# Patient Record
Sex: FEMALE | Race: ASIAN | Employment: OTHER | URBAN - METROPOLITAN AREA
[De-identification: names, ages, dates, MRNs, and addresses within clinical notes are randomized per-mention and may not be internally consistent; named-entity substitution may affect disease eponyms.]

---

## 2017-05-23 ENCOUNTER — HOSPITAL ENCOUNTER (OUTPATIENT)
Dept: MAMMOGRAPHY | Age: 72
Discharge: HOME | End: 2017-05-23
Attending: FAMILY MEDICINE
Payer: COMMERCIAL

## 2017-05-23 DIAGNOSIS — Z12.31 ENCOUNTER FOR SCREENING MAMMOGRAM FOR MALIGNANT NEOPLASM OF BREAST: ICD-10-CM

## 2017-05-23 PROCEDURE — 77067 SCR MAMMO BI INCL CAD: CPT | Performed by: FAMILY MEDICINE

## 2017-05-31 ENCOUNTER — HOSPITAL ENCOUNTER (OUTPATIENT)
Dept: MAMMOGRAPHY | Facility: HOSPITAL | Age: 72
Discharge: HOME | End: 2017-05-31
Attending: FAMILY MEDICINE
Payer: COMMERCIAL

## 2017-05-31 DIAGNOSIS — R92.2 INCONCLUSIVE MAMMOGRAM: ICD-10-CM

## 2017-05-31 PROCEDURE — 77065 DX MAMMO INCL CAD UNI: CPT | Performed by: FAMILY MEDICINE

## 2017-05-31 PROCEDURE — 76642 ULTRASOUND BREAST LIMITED: CPT | Performed by: FAMILY MEDICINE

## 2017-05-31 PROCEDURE — 77061 BREAST TOMOSYNTHESIS UNI: CPT | Performed by: FAMILY MEDICINE

## 2018-07-02 ENCOUNTER — HOSPITAL ENCOUNTER (OUTPATIENT)
Dept: MAMMOGRAPHY | Age: 73
Discharge: HOME | End: 2018-07-02
Attending: FAMILY MEDICINE
Payer: COMMERCIAL

## 2018-07-02 DIAGNOSIS — Z12.31 ENCOUNTER FOR SCREENING MAMMOGRAM FOR MALIGNANT NEOPLASM OF BREAST: ICD-10-CM

## 2018-07-02 DIAGNOSIS — R92.1 BREAST CALCIFICATION, LEFT: ICD-10-CM

## 2018-07-02 PROCEDURE — 77067 SCR MAMMO BI INCL CAD: CPT | Performed by: FAMILY MEDICINE

## 2018-07-02 PROCEDURE — 77063 BREAST TOMOSYNTHESIS BI: CPT | Performed by: FAMILY MEDICINE

## 2018-07-05 ENCOUNTER — HOSPITAL ENCOUNTER (OUTPATIENT)
Dept: MAMMOGRAPHY | Age: 73
Discharge: HOME | End: 2018-07-05
Attending: FAMILY MEDICINE
Payer: COMMERCIAL

## 2018-07-05 DIAGNOSIS — R92.1 BREAST CALCIFICATION, LEFT: ICD-10-CM

## 2018-07-05 DIAGNOSIS — Z12.31 ENCOUNTER FOR SCREENING MAMMOGRAM FOR MALIGNANT NEOPLASM OF BREAST: ICD-10-CM

## 2018-07-05 PROCEDURE — 77063 BREAST TOMOSYNTHESIS BI: CPT | Performed by: FAMILY MEDICINE

## 2018-07-05 PROCEDURE — 77067 SCR MAMMO BI INCL CAD: CPT | Performed by: FAMILY MEDICINE

## 2020-11-11 ENCOUNTER — HOSPITAL ENCOUNTER (OUTPATIENT)
Dept: MAMMOGRAPHY | Age: 75
Discharge: HOME | End: 2020-11-11
Attending: FAMILY MEDICINE
Payer: COMMERCIAL

## 2020-11-11 DIAGNOSIS — R92.1 BREAST CALCIFICATIONS ON MAMMOGRAM: ICD-10-CM

## 2020-11-11 DIAGNOSIS — Z12.31 ENCOUNTER FOR SCREENING MAMMOGRAM FOR MALIGNANT NEOPLASM OF BREAST: ICD-10-CM

## 2020-11-11 PROCEDURE — 77063 BREAST TOMOSYNTHESIS BI: CPT | Performed by: FAMILY MEDICINE

## 2020-11-11 PROCEDURE — 77067 SCR MAMMO BI INCL CAD: CPT | Performed by: FAMILY MEDICINE

## 2020-11-16 ENCOUNTER — HOSPITAL ENCOUNTER (OUTPATIENT)
Dept: MAMMOGRAPHY | Facility: HOSPITAL | Age: 75
Discharge: HOME | End: 2020-11-16
Attending: FAMILY MEDICINE
Payer: COMMERCIAL

## 2020-11-16 DIAGNOSIS — R92.2 INCONCLUSIVE MAMMOGRAM: ICD-10-CM

## 2020-11-16 PROCEDURE — 77065 DX MAMMO INCL CAD UNI: CPT | Performed by: FAMILY MEDICINE

## 2021-06-09 ENCOUNTER — HOSPITAL ENCOUNTER (OUTPATIENT)
Dept: MAMMOGRAPHY | Facility: HOSPITAL | Age: 76
Discharge: HOME | End: 2021-06-09
Attending: FAMILY MEDICINE
Payer: COMMERCIAL

## 2021-06-09 DIAGNOSIS — R92.1 BREAST CALCIFICATION SEEN ON MAMMOGRAM: ICD-10-CM

## 2021-06-09 PROCEDURE — 77065 DX MAMMO INCL CAD UNI: CPT | Performed by: FAMILY MEDICINE

## 2021-06-09 PROCEDURE — 77061 BREAST TOMOSYNTHESIS UNI: CPT | Performed by: FAMILY MEDICINE

## 2021-06-09 PROCEDURE — 76642 ULTRASOUND BREAST LIMITED: CPT | Performed by: FAMILY MEDICINE

## 2021-06-09 NOTE — IMAGING NOTE
This Breast Care RN assisted Dr. Dinorah Perez with recommendation for a left breast 1 site stereotactic biopsy for calcifications. Procedure reviewed and all questions answered. Emotional and educational support given.    On the day of the biopsy, pt instructed

## 2021-06-15 ENCOUNTER — HOSPITAL ENCOUNTER (OUTPATIENT)
Dept: MAMMOGRAPHY | Facility: HOSPITAL | Age: 76
Discharge: HOME | End: 2021-06-15
Attending: FAMILY MEDICINE
Payer: COMMERCIAL

## 2021-06-15 DIAGNOSIS — R92.2 INCONCLUSIVE MAMMOGRAM: ICD-10-CM

## 2021-06-15 PROCEDURE — 88305 TISSUE EXAM BY PATHOLOGIST: CPT | Performed by: FAMILY MEDICINE

## 2021-06-15 PROCEDURE — 19081 BX BREAST 1ST LESION STRTCTC: CPT | Performed by: FAMILY MEDICINE

## 2021-06-15 PROCEDURE — 88342 IMHCHEM/IMCYTCHM 1ST ANTB: CPT | Performed by: FAMILY MEDICINE

## 2021-06-17 NOTE — IMAGING NOTE
1514: Spoke with Lashell Merida post stereotactic left breast biopsy. Ms. Yovani Diaz identified with name and date of birth. Introduced myself as breast care coordinator. Reinforced post biopsy care and instruction. Ms. Yovani Diaz.  denies any issues with biopsy

## 2021-07-30 ENCOUNTER — TELEPHONE (OUTPATIENT)
Dept: FAMILY MEDICINE CLINIC | Facility: CLINIC | Age: 76
End: 2021-07-30

## 2021-07-30 NOTE — TELEPHONE ENCOUNTER
Surgery on 08/17/21, RTKA with Dr. Thomas See @ [address] [place] [address]    H&P- complete  Labs- HGB (11.9), MCH (25.4), BUN/Creat ratio (23.0), A/G ratio (0.8), MRSA pending, all other labs WNL  EKG- in Media 09/29/20 WNL    Patient will need Cardiac Clearance and ASA recs  Dr. Tereso Fine

## 2021-08-02 RX ORDER — CHLORAL HYDRATE 500 MG
1000 CAPSULE ORAL NIGHTLY
COMMUNITY

## 2021-08-02 RX ORDER — IBUPROFEN 200 MG
200 TABLET ORAL EVERY 6 HOURS PRN
COMMUNITY

## 2021-08-02 RX ORDER — YEAST,DRIED (S. CEREVISIAE)
POWDER (GRAM) ORAL DAILY
COMMUNITY

## 2021-08-02 RX ORDER — LEVOTHYROXINE SODIUM 0.07 MG/1
75 TABLET ORAL
COMMUNITY

## 2021-08-03 ENCOUNTER — OFFICE VISIT (OUTPATIENT)
Dept: FAMILY MEDICINE CLINIC | Facility: CLINIC | Age: 76
End: 2021-08-03
Payer: COMMERCIAL

## 2021-08-03 ENCOUNTER — LAB ENCOUNTER (OUTPATIENT)
Dept: LAB | Facility: HOSPITAL | Age: 76
End: 2021-08-03
Attending: FAMILY MEDICINE
Payer: COMMERCIAL

## 2021-08-03 VITALS
HEART RATE: 58 BPM | RESPIRATION RATE: 16 BRPM | OXYGEN SATURATION: 98 % | WEIGHT: 170 LBS | HEIGHT: 64.5 IN | SYSTOLIC BLOOD PRESSURE: 102 MMHG | DIASTOLIC BLOOD PRESSURE: 64 MMHG | BODY MASS INDEX: 28.67 KG/M2 | TEMPERATURE: 98 F

## 2021-08-03 DIAGNOSIS — Z01.812 PRE-OPERATIVE LABORATORY EXAMINATION: ICD-10-CM

## 2021-08-03 DIAGNOSIS — M17.12 OSTEOARTHROSIS, LOCALIZED, PRIMARY, KNEE, LEFT: ICD-10-CM

## 2021-08-03 DIAGNOSIS — Z98.61 S/P PTCA (PERCUTANEOUS TRANSLUMINAL CORONARY ANGIOPLASTY): ICD-10-CM

## 2021-08-03 DIAGNOSIS — L81.1 MELASMA: ICD-10-CM

## 2021-08-03 DIAGNOSIS — M54.50 CHRONIC BILATERAL LOW BACK PAIN WITHOUT SCIATICA: ICD-10-CM

## 2021-08-03 DIAGNOSIS — Z01.818 PREOP EXAMINATION: ICD-10-CM

## 2021-08-03 DIAGNOSIS — M85.88 OSTEOPENIA OF LUMBAR SPINE: ICD-10-CM

## 2021-08-03 DIAGNOSIS — I25.10 CORONARY ARTERY DISEASE INVOLVING NATIVE CORONARY ARTERY OF NATIVE HEART WITHOUT ANGINA PECTORIS: ICD-10-CM

## 2021-08-03 DIAGNOSIS — R94.6 BORDERLINE ABNORMAL TFTS: ICD-10-CM

## 2021-08-03 DIAGNOSIS — E55.9 VITAMIN D DEFICIENCY: ICD-10-CM

## 2021-08-03 DIAGNOSIS — G89.29 CHRONIC BILATERAL LOW BACK PAIN WITHOUT SCIATICA: ICD-10-CM

## 2021-08-03 DIAGNOSIS — E78.2 MIXED HYPERLIPIDEMIA: ICD-10-CM

## 2021-08-03 DIAGNOSIS — M17.11 OSTEOARTHROSIS, LOCALIZED, PRIMARY, KNEE, RIGHT: ICD-10-CM

## 2021-08-03 DIAGNOSIS — M17.0 PRIMARY OSTEOARTHRITIS OF BOTH KNEES: Primary | ICD-10-CM

## 2021-08-03 DIAGNOSIS — R73.02 GLUCOSE INTOLERANCE (IMPAIRED GLUCOSE TOLERANCE): ICD-10-CM

## 2021-08-03 DIAGNOSIS — I10 ESSENTIAL HYPERTENSION: ICD-10-CM

## 2021-08-03 LAB
ALBUMIN SERPL-MCNC: 3.4 G/DL (ref 3.4–5)
ALBUMIN/GLOB SERPL: 0.8 {RATIO} (ref 1–2)
ALP LIVER SERPL-CCNC: 89 U/L
ALT SERPL-CCNC: 22 U/L
ANION GAP SERPL CALC-SCNC: 9 MMOL/L (ref 0–18)
ANTIBODY SCREEN: NEGATIVE
AST SERPL-CCNC: 15 U/L (ref 15–37)
BASOPHILS # BLD AUTO: 0.02 X10(3) UL (ref 0–0.2)
BASOPHILS NFR BLD AUTO: 0.3 %
BILIRUB SERPL-MCNC: 0.4 MG/DL (ref 0.1–2)
BUN BLD-MCNC: 14 MG/DL (ref 7–18)
BUN/CREAT SERPL: 23 (ref 10–20)
CALCIUM BLD-MCNC: 10 MG/DL (ref 8.5–10.1)
CHLORIDE SERPL-SCNC: 104 MMOL/L (ref 98–112)
CO2 SERPL-SCNC: 29 MMOL/L (ref 21–32)
CREAT BLD-MCNC: 0.61 MG/DL
DEPRECATED RDW RBC AUTO: 41.9 FL (ref 35.1–46.3)
EOSINOPHIL # BLD AUTO: 0.49 X10(3) UL (ref 0–0.7)
EOSINOPHIL NFR BLD AUTO: 6.8 %
ERYTHROCYTE [DISTWIDTH] IN BLOOD BY AUTOMATED COUNT: 14.2 % (ref 11–15)
GLOBULIN PLAS-MCNC: 4.3 G/DL (ref 2.8–4.4)
GLUCOSE BLD-MCNC: 94 MG/DL (ref 70–99)
HCT VFR BLD AUTO: 38.1 %
HGB BLD-MCNC: 11.9 G/DL
IMM GRANULOCYTES # BLD AUTO: 0.02 X10(3) UL (ref 0–1)
IMM GRANULOCYTES NFR BLD: 0.3 %
LYMPHOCYTES # BLD AUTO: 1.69 X10(3) UL (ref 1–4)
LYMPHOCYTES NFR BLD AUTO: 23.6 %
M PROTEIN MFR SERPL ELPH: 7.7 G/DL (ref 6.4–8.2)
MCH RBC QN AUTO: 25.4 PG (ref 26–34)
MCHC RBC AUTO-ENTMCNC: 31.2 G/DL (ref 31–37)
MCV RBC AUTO: 81.4 FL
MONOCYTES # BLD AUTO: 0.44 X10(3) UL (ref 0.1–1)
MONOCYTES NFR BLD AUTO: 6.1 %
NEUTROPHILS # BLD AUTO: 4.51 X10 (3) UL (ref 1.5–7.7)
NEUTROPHILS # BLD AUTO: 4.51 X10(3) UL (ref 1.5–7.7)
NEUTROPHILS NFR BLD AUTO: 62.9 %
OSMOLALITY SERPL CALC.SUM OF ELEC: 294 MOSM/KG (ref 275–295)
PATIENT FASTING Y/N/NP: YES
PLATELET # BLD AUTO: 243 10(3)UL (ref 150–450)
POTASSIUM SERPL-SCNC: 3.7 MMOL/L (ref 3.5–5.1)
RBC # BLD AUTO: 4.68 X10(6)UL
RH BLOOD TYPE: POSITIVE
SODIUM SERPL-SCNC: 142 MMOL/L (ref 136–145)
WBC # BLD AUTO: 7.2 X10(3) UL (ref 4–11)

## 2021-08-03 PROCEDURE — 3008F BODY MASS INDEX DOCD: CPT | Performed by: FAMILY MEDICINE

## 2021-08-03 PROCEDURE — 99204 OFFICE O/P NEW MOD 45 MIN: CPT | Performed by: FAMILY MEDICINE

## 2021-08-03 PROCEDURE — 3078F DIAST BP <80 MM HG: CPT | Performed by: FAMILY MEDICINE

## 2021-08-03 PROCEDURE — 36415 COLL VENOUS BLD VENIPUNCTURE: CPT

## 2021-08-03 PROCEDURE — 80053 COMPREHEN METABOLIC PANEL: CPT

## 2021-08-03 PROCEDURE — 86850 RBC ANTIBODY SCREEN: CPT

## 2021-08-03 PROCEDURE — 85025 COMPLETE CBC W/AUTO DIFF WBC: CPT

## 2021-08-03 PROCEDURE — 3074F SYST BP LT 130 MM HG: CPT | Performed by: FAMILY MEDICINE

## 2021-08-03 PROCEDURE — 86900 BLOOD TYPING SEROLOGIC ABO: CPT

## 2021-08-03 PROCEDURE — 86901 BLOOD TYPING SEROLOGIC RH(D): CPT

## 2021-08-03 PROCEDURE — 87081 CULTURE SCREEN ONLY: CPT

## 2021-08-03 NOTE — H&P
[address] [facility] PRE-OP CLINIC [place]    PRE-OP NOTE    HPI:   I have been consulted by Dr. Mario Whyte to see Sarahdick Ennisdavid 67year old female for a preoperative evaluation and medical clearance.  She has a long history of worsening severe degenerative arthritis of Oral Cap Take 50,000 Units by mouth once a week.    0   • Multiple Vitamins-Minerals (MULTIVITAMIN WITH MINERALS) Oral Tab Take 1 tablet by mouth daily.        Past Medical History:   Diagnosis Date   • Back pain    • Back problem    • CAD (coronary artery per Week:       Minutes of Exercise per Session:   Stress:       Feeling of Stress :   Social Connections:       Frequency of Communication with Friends and Family:       Frequency of Social Gatherings with Friends and Family:       Attends [employer], no apparent distress. HEENT:  Head:  Normocephalic, atraumatic  Nose: patent, no nasal discharge Throat:  No tonsillar erythema or exudate. Mouth:  No oral lesions or ulcerations, good dentition.   NECK: Supple, no CLAD, no JVD, no carotid brui hyperlipidemia    (R94.6) Borderline abnormal TFTs    (R73.02) Glucose intolerance (impaired glucose tolerance)    (E55.9) Vitamin D deficiency    (M85.88) Osteopenia of lumbar spine    (M54.5,  G89.29) Chronic bilateral low back pain without sciatica    ( supplements with anticoagulation properties as per instruction. Postoperative Recommendations:    Anticoagulation / DVT prophylaxis: SCDs,  mg twice daily with food for four weeks.     GI protection: Protonix  Incentive Spirometry   Telemetry a

## 2021-08-03 NOTE — H&P (VIEW-ONLY)
[address] [facility] PRE-OP CLINIC [place]    PRE-OP NOTE    HPI:   I have been consulted by Dr. Lilibeth Scott to see Torresdamian Devriesluciano 67year old female for a preoperative evaluation and medical clearance.  She has a long history of worsening severe degenerative arthritis of Oral Cap Take 50,000 Units by mouth once a week.    0   • Multiple Vitamins-Minerals (MULTIVITAMIN WITH MINERALS) Oral Tab Take 1 tablet by mouth daily.        Past Medical History:   Diagnosis Date   • Back pain    • Back problem    • CAD (coronary artery per Week:       Minutes of Exercise per Session:   Stress:       Feeling of Stress :   Social Connections:       Frequency of Communication with Friends and Family:       Frequency of Social Gatherings with Friends and Family:       Attends [employer] nour[employer], no apparent distress. HEENT:  Head:  Normocephalic, atraumatic  Nose: patent, no nasal discharge Throat:  No tonsillar erythema or exudate. Mouth:  No oral lesions or ulcerations, good dentition.   NECK: Supple, no CLAD, no JVD, no carotid brui hyperlipidemia    (R94.6) Borderline abnormal TFTs    (R73.02) Glucose intolerance (impaired glucose tolerance)    (E55.9) Vitamin D deficiency    (M85.88) Osteopenia of lumbar spine    (M54.5,  G89.29) Chronic bilateral low back pain without sciatica    ( supplements with anticoagulation properties as per instruction. Postoperative Recommendations:    Anticoagulation / DVT prophylaxis: SCDs,  mg twice daily with food for four weeks.     GI protection: Protonix  Incentive Spirometry   Telemetry a

## 2021-08-04 NOTE — TELEPHONE ENCOUNTER
Surgery on 08/17/21, RTKA with Dr. Jeremiah Trevizo @ [address] [place] [address]    H&P- complete  Labs- HGB (11.9), MCH (25.4), BUN/Creat ratio (23.0), A/G ratio (0.8), all other labs WNL  EKG- in Media 09/29/20 WNL    Patient will need Cardiac Clearance and ASA recs  Dr. Alanna Patel- 6

## 2021-08-06 NOTE — DISCHARGE SUMMARY
Ortho Discharge Summary     Patient ID:  Yoana Gonzalez  [id number]  27 year old  6/20/1945      Admit Date: 8/17/2021 10:17 AM      Discharge Date and Time: 8/18/2021  4:54 PM      Attending Physician: Xi Caballero MD     Reason for admission: right kne

## 2021-08-11 NOTE — TELEPHONE ENCOUNTER
Dr. Franklin Florence, please review:    Labs- HGB (11.9), MCH (25.4), BUN/Creat ratio (23.0), A/G ratio (0.8), all other labs WNL  EKG- in Media 09/29/20 WNL    Cardiac Clearance located in Encounters 08/10/21. Per Dr. Samson Nino:  Last Nuclear Stress 2016 nl perfusion.  CP f

## 2021-08-13 NOTE — TELEPHONE ENCOUNTER
Spoke to patient, let her know per Dr. Gretchen Angel she is clear for surgery and she has stopped her ASA.

## 2021-08-14 ENCOUNTER — LAB ENCOUNTER (OUTPATIENT)
Dept: LAB | Facility: HOSPITAL | Age: 76
End: 2021-08-14
Attending: ORTHOPAEDIC SURGERY
Payer: COMMERCIAL

## 2021-08-14 DIAGNOSIS — Z01.818 PREOP TESTING: ICD-10-CM

## 2021-08-16 PROBLEM — Z96.659 S/P KNEE REPLACEMENT: Status: ACTIVE | Noted: 2021-08-16

## 2021-08-16 LAB — SARS-COV-2 RNA RESP QL NAA+PROBE: NOT DETECTED

## 2021-08-17 ENCOUNTER — ANESTHESIA (OUTPATIENT)
Dept: SURGERY | Facility: HOSPITAL | Age: 76
End: 2021-08-17
Payer: COMMERCIAL

## 2021-08-17 ENCOUNTER — HOSPITAL ENCOUNTER (OUTPATIENT)
Facility: HOSPITAL | Age: 76
Discharge: HOME HEALTH | End: 2021-08-18
Attending: ORTHOPAEDIC SURGERY | Admitting: ORTHOPAEDIC SURGERY
Payer: COMMERCIAL

## 2021-08-17 ENCOUNTER — ANESTHESIA EVENT (OUTPATIENT)
Dept: SURGERY | Facility: HOSPITAL | Age: 76
End: 2021-08-17
Payer: COMMERCIAL

## 2021-08-17 ENCOUNTER — APPOINTMENT (OUTPATIENT)
Dept: GENERAL RADIOLOGY | Facility: HOSPITAL | Age: 76
End: 2021-08-17
Attending: PHYSICIAN ASSISTANT
Payer: COMMERCIAL

## 2021-08-17 DIAGNOSIS — Z01.818 PREOP TESTING: Primary | ICD-10-CM

## 2021-08-17 PROCEDURE — 88305 TISSUE EXAM BY PATHOLOGIST: CPT | Performed by: ORTHOPAEDIC SURGERY

## 2021-08-17 PROCEDURE — 0SRC0J9 REPLACEMENT OF RIGHT KNEE JOINT WITH SYNTHETIC SUBSTITUTE, CEMENTED, OPEN APPROACH: ICD-10-PCS | Performed by: ORTHOPAEDIC SURGERY

## 2021-08-17 PROCEDURE — 88311 DECALCIFY TISSUE: CPT | Performed by: ORTHOPAEDIC SURGERY

## 2021-08-17 PROCEDURE — 73560 X-RAY EXAM OF KNEE 1 OR 2: CPT | Performed by: PHYSICIAN ASSISTANT

## 2021-08-17 DEVICE — INSERT TIB 7 12MM STRL KNEE R: Type: IMPLANTABLE DEVICE | Site: KNEE | Status: FUNCTIONAL

## 2021-08-17 DEVICE — COBALT-G MV BONE CEMENT 40GM
Type: IMPLANTABLE DEVICE | Site: KNEE | Status: FUNCTIONAL
Brand: DJO SURGICAL

## 2021-08-17 DEVICE — COMP FEM 7 STRL KNEE R NPOR: Type: IMPLANTABLE DEVICE | Site: KNEE | Status: FUNCTIONAL

## 2021-08-17 DEVICE — BP TIB 7- STRL KNEE R NPOR DJO: Type: IMPLANTABLE DEVICE | Site: KNEE | Status: FUNCTIONAL

## 2021-08-17 DEVICE — DOMED TRI-PEG PATELLA, 35X9MM, E-PLUS
Type: IMPLANTABLE DEVICE | Site: KNEE | Status: FUNCTIONAL
Brand: DJO SURGICAL

## 2021-08-17 RX ORDER — DIPHENHYDRAMINE HYDROCHLORIDE 50 MG/ML
12.5 INJECTION INTRAMUSCULAR; INTRAVENOUS EVERY 4 HOURS PRN
Status: DISCONTINUED | OUTPATIENT
Start: 2021-08-17 | End: 2021-08-18

## 2021-08-17 RX ORDER — BUPIVACAINE HYDROCHLORIDE 7.5 MG/ML
INJECTION, SOLUTION INTRASPINAL AS NEEDED
Status: DISCONTINUED | OUTPATIENT
Start: 2021-08-17 | End: 2021-08-17 | Stop reason: SURG

## 2021-08-17 RX ORDER — ONDANSETRON 2 MG/ML
4 INJECTION INTRAMUSCULAR; INTRAVENOUS EVERY 4 HOURS PRN
Status: DISCONTINUED | OUTPATIENT
Start: 2021-08-17 | End: 2021-08-18

## 2021-08-17 RX ORDER — BISACODYL 10 MG
10 SUPPOSITORY, RECTAL RECTAL
Status: DISCONTINUED | OUTPATIENT
Start: 2021-08-17 | End: 2021-08-18

## 2021-08-17 RX ORDER — LEVOTHYROXINE SODIUM 0.07 MG/1
75 TABLET ORAL
Status: DISCONTINUED | OUTPATIENT
Start: 2021-08-18 | End: 2021-08-18

## 2021-08-17 RX ORDER — SCOLOPAMINE TRANSDERMAL SYSTEM 1 MG/1
1 PATCH, EXTENDED RELEASE TRANSDERMAL ONCE
Status: DISCONTINUED | OUTPATIENT
Start: 2021-08-17 | End: 2021-08-18

## 2021-08-17 RX ORDER — DIPHENHYDRAMINE HCL 25 MG
25 CAPSULE ORAL EVERY 4 HOURS PRN
Status: DISCONTINUED | OUTPATIENT
Start: 2021-08-17 | End: 2021-08-18

## 2021-08-17 RX ORDER — ONDANSETRON 2 MG/ML
INJECTION INTRAMUSCULAR; INTRAVENOUS AS NEEDED
Status: DISCONTINUED | OUTPATIENT
Start: 2021-08-17 | End: 2021-08-17 | Stop reason: SURG

## 2021-08-17 RX ORDER — ASPIRIN 325 MG
325 TABLET, DELAYED RELEASE (ENTERIC COATED) ORAL 2 TIMES DAILY
Status: DISCONTINUED | OUTPATIENT
Start: 2021-08-17 | End: 2021-08-18

## 2021-08-17 RX ORDER — SODIUM PHOSPHATE, DIBASIC AND SODIUM PHOSPHATE, MONOBASIC 7; 19 G/133ML; G/133ML
1 ENEMA RECTAL ONCE AS NEEDED
Status: DISCONTINUED | OUTPATIENT
Start: 2021-08-17 | End: 2021-08-18

## 2021-08-17 RX ORDER — FAMOTIDINE 10 MG/ML
20 INJECTION, SOLUTION INTRAVENOUS 2 TIMES DAILY
Status: DISCONTINUED | OUTPATIENT
Start: 2021-08-17 | End: 2021-08-18

## 2021-08-17 RX ORDER — HYDROMORPHONE HYDROCHLORIDE 1 MG/ML
0.4 INJECTION, SOLUTION INTRAMUSCULAR; INTRAVENOUS; SUBCUTANEOUS EVERY 5 MIN PRN
Status: DISCONTINUED | OUTPATIENT
Start: 2021-08-17 | End: 2021-08-17 | Stop reason: HOSPADM

## 2021-08-17 RX ORDER — MIDAZOLAM HYDROCHLORIDE 1 MG/ML
INJECTION INTRAMUSCULAR; INTRAVENOUS AS NEEDED
Status: DISCONTINUED | OUTPATIENT
Start: 2021-08-17 | End: 2021-08-17 | Stop reason: SURG

## 2021-08-17 RX ORDER — SODIUM CHLORIDE, SODIUM LACTATE, POTASSIUM CHLORIDE, CALCIUM CHLORIDE 600; 310; 30; 20 MG/100ML; MG/100ML; MG/100ML; MG/100ML
INJECTION, SOLUTION INTRAVENOUS CONTINUOUS
Status: DISCONTINUED | OUTPATIENT
Start: 2021-08-17 | End: 2021-08-17 | Stop reason: HOSPADM

## 2021-08-17 RX ORDER — DEXAMETHASONE SODIUM PHOSPHATE 4 MG/ML
VIAL (ML) INJECTION AS NEEDED
Status: DISCONTINUED | OUTPATIENT
Start: 2021-08-17 | End: 2021-08-17 | Stop reason: SURG

## 2021-08-17 RX ORDER — HYDROCODONE BITARTRATE AND ACETAMINOPHEN 5; 325 MG/1; MG/1
2 TABLET ORAL AS NEEDED
Status: DISCONTINUED | OUTPATIENT
Start: 2021-08-17 | End: 2021-08-17 | Stop reason: HOSPADM

## 2021-08-17 RX ORDER — ONDANSETRON 2 MG/ML
4 INJECTION INTRAMUSCULAR; INTRAVENOUS ONCE AS NEEDED
Status: DISCONTINUED | OUTPATIENT
Start: 2021-08-17 | End: 2021-08-17 | Stop reason: HOSPADM

## 2021-08-17 RX ORDER — METOPROLOL TARTRATE 5 MG/5ML
2.5 INJECTION INTRAVENOUS ONCE
Status: DISCONTINUED | OUTPATIENT
Start: 2021-08-17 | End: 2021-08-17 | Stop reason: HOSPADM

## 2021-08-17 RX ORDER — HYDROCODONE BITARTRATE AND ACETAMINOPHEN 5; 325 MG/1; MG/1
1 TABLET ORAL AS NEEDED
Status: DISCONTINUED | OUTPATIENT
Start: 2021-08-17 | End: 2021-08-17 | Stop reason: HOSPADM

## 2021-08-17 RX ORDER — TRANEXAMIC ACID 10 MG/ML
1000 INJECTION, SOLUTION INTRAVENOUS ONCE
Status: DISCONTINUED | OUTPATIENT
Start: 2021-08-18 | End: 2021-08-17 | Stop reason: HOSPADM

## 2021-08-17 RX ORDER — HYDROMORPHONE HYDROCHLORIDE 1 MG/ML
0.2 INJECTION, SOLUTION INTRAMUSCULAR; INTRAVENOUS; SUBCUTANEOUS EVERY 5 MIN PRN
Status: DISCONTINUED | OUTPATIENT
Start: 2021-08-17 | End: 2021-08-17 | Stop reason: HOSPADM

## 2021-08-17 RX ORDER — MORPHINE SULFATE 10 MG/ML
6 INJECTION, SOLUTION INTRAMUSCULAR; INTRAVENOUS EVERY 10 MIN PRN
Status: DISCONTINUED | OUTPATIENT
Start: 2021-08-17 | End: 2021-08-17 | Stop reason: HOSPADM

## 2021-08-17 RX ORDER — CELECOXIB 200 MG/1
200 CAPSULE ORAL ONCE
Status: COMPLETED | OUTPATIENT
Start: 2021-08-17 | End: 2021-08-17

## 2021-08-17 RX ORDER — LOSARTAN POTASSIUM 25 MG/1
25 TABLET ORAL DAILY
Status: DISCONTINUED | OUTPATIENT
Start: 2021-08-18 | End: 2021-08-18

## 2021-08-17 RX ORDER — ACETAMINOPHEN 325 MG/1
650 TABLET ORAL EVERY 6 HOURS SCHEDULED
Status: DISCONTINUED | OUTPATIENT
Start: 2021-08-17 | End: 2021-08-18

## 2021-08-17 RX ORDER — DIPHENHYDRAMINE HYDROCHLORIDE 50 MG/ML
25 INJECTION INTRAMUSCULAR; INTRAVENOUS ONCE AS NEEDED
Status: ACTIVE | OUTPATIENT
Start: 2021-08-17 | End: 2021-08-17

## 2021-08-17 RX ORDER — MORPHINE SULFATE 4 MG/ML
2 INJECTION, SOLUTION INTRAMUSCULAR; INTRAVENOUS EVERY 10 MIN PRN
Status: DISCONTINUED | OUTPATIENT
Start: 2021-08-17 | End: 2021-08-17 | Stop reason: HOSPADM

## 2021-08-17 RX ORDER — CEFAZOLIN SODIUM/WATER 2 G/20 ML
2 SYRINGE (ML) INTRAVENOUS EVERY 8 HOURS
Status: COMPLETED | OUTPATIENT
Start: 2021-08-17 | End: 2021-08-18

## 2021-08-17 RX ORDER — POLYETHYLENE GLYCOL 3350 17 G/17G
17 POWDER, FOR SOLUTION ORAL DAILY PRN
Status: DISCONTINUED | OUTPATIENT
Start: 2021-08-17 | End: 2021-08-18

## 2021-08-17 RX ORDER — ONDANSETRON 4 MG/1
4 TABLET, FILM COATED ORAL EVERY 8 HOURS PRN
Qty: 30 TABLET | Refills: 0 | Status: SHIPPED | OUTPATIENT
Start: 2021-08-17

## 2021-08-17 RX ORDER — OXYCODONE HYDROCHLORIDE 5 MG/1
TABLET ORAL EVERY 4 HOURS PRN
Qty: 60 TABLET | Refills: 0 | Status: SHIPPED | OUTPATIENT
Start: 2021-08-17

## 2021-08-17 RX ORDER — ASPIRIN 325 MG
325 TABLET, DELAYED RELEASE (ENTERIC COATED) ORAL 2 TIMES DAILY
Qty: 60 TABLET | Refills: 0 | Status: SHIPPED | OUTPATIENT
Start: 2021-08-17 | End: 2021-09-16

## 2021-08-17 RX ORDER — TRANEXAMIC ACID 10 MG/ML
INJECTION, SOLUTION INTRAVENOUS AS NEEDED
Status: DISCONTINUED | OUTPATIENT
Start: 2021-08-17 | End: 2021-08-17 | Stop reason: SURG

## 2021-08-17 RX ORDER — NALOXONE HYDROCHLORIDE 0.4 MG/ML
80 INJECTION, SOLUTION INTRAMUSCULAR; INTRAVENOUS; SUBCUTANEOUS AS NEEDED
Status: DISCONTINUED | OUTPATIENT
Start: 2021-08-17 | End: 2021-08-17 | Stop reason: HOSPADM

## 2021-08-17 RX ORDER — SODIUM CHLORIDE, SODIUM LACTATE, POTASSIUM CHLORIDE, CALCIUM CHLORIDE 600; 310; 30; 20 MG/100ML; MG/100ML; MG/100ML; MG/100ML
INJECTION, SOLUTION INTRAVENOUS CONTINUOUS
Status: DISCONTINUED | OUTPATIENT
Start: 2021-08-17 | End: 2021-08-18

## 2021-08-17 RX ORDER — OXYCODONE HYDROCHLORIDE 5 MG/1
10 TABLET ORAL EVERY 4 HOURS PRN
Status: DISCONTINUED | OUTPATIENT
Start: 2021-08-17 | End: 2021-08-18

## 2021-08-17 RX ORDER — EPHEDRINE SULFATE 50 MG/ML
INJECTION INTRAVENOUS AS NEEDED
Status: DISCONTINUED | OUTPATIENT
Start: 2021-08-17 | End: 2021-08-17 | Stop reason: SURG

## 2021-08-17 RX ORDER — ACETAMINOPHEN, ASPIRIN AND CAFFEINE 250; 250; 65 MG/1; MG/1; MG/1
2 TABLET, FILM COATED ORAL DAILY PRN
Status: DISCONTINUED | OUTPATIENT
Start: 2021-08-17 | End: 2021-08-18

## 2021-08-17 RX ORDER — FAMOTIDINE 20 MG/1
20 TABLET ORAL 2 TIMES DAILY
Status: DISCONTINUED | OUTPATIENT
Start: 2021-08-17 | End: 2021-08-18

## 2021-08-17 RX ORDER — PROCHLORPERAZINE EDISYLATE 5 MG/ML
10 INJECTION INTRAMUSCULAR; INTRAVENOUS EVERY 6 HOURS PRN
Status: DISCONTINUED | OUTPATIENT
Start: 2021-08-17 | End: 2021-08-18

## 2021-08-17 RX ORDER — ATORVASTATIN CALCIUM 20 MG/1
20 TABLET, FILM COATED ORAL NIGHTLY
Status: DISCONTINUED | OUTPATIENT
Start: 2021-08-17 | End: 2021-08-18

## 2021-08-17 RX ORDER — ACETAMINOPHEN 500 MG
1000 TABLET ORAL ONCE
Status: COMPLETED | OUTPATIENT
Start: 2021-08-17 | End: 2021-08-17

## 2021-08-17 RX ORDER — CYCLOBENZAPRINE HCL 5 MG
5 TABLET ORAL EVERY 8 HOURS PRN
Status: DISCONTINUED | OUTPATIENT
Start: 2021-08-17 | End: 2021-08-18

## 2021-08-17 RX ORDER — PROCHLORPERAZINE EDISYLATE 5 MG/ML
5 INJECTION INTRAMUSCULAR; INTRAVENOUS ONCE AS NEEDED
Status: DISCONTINUED | OUTPATIENT
Start: 2021-08-17 | End: 2021-08-17 | Stop reason: HOSPADM

## 2021-08-17 RX ORDER — CEFAZOLIN SODIUM/WATER 2 G/20 ML
2 SYRINGE (ML) INTRAVENOUS ONCE
Status: COMPLETED | OUTPATIENT
Start: 2021-08-17 | End: 2021-08-17

## 2021-08-17 RX ORDER — DOCUSATE SODIUM 100 MG/1
100 CAPSULE, LIQUID FILLED ORAL 2 TIMES DAILY
Status: DISCONTINUED | OUTPATIENT
Start: 2021-08-17 | End: 2021-08-18

## 2021-08-17 RX ORDER — PANTOPRAZOLE SODIUM 40 MG/1
40 TABLET, DELAYED RELEASE ORAL
Qty: 30 TABLET | Refills: 0 | Status: SHIPPED | OUTPATIENT
Start: 2021-08-17

## 2021-08-17 RX ORDER — HYDROMORPHONE HYDROCHLORIDE 1 MG/ML
0.6 INJECTION, SOLUTION INTRAMUSCULAR; INTRAVENOUS; SUBCUTANEOUS EVERY 5 MIN PRN
Status: DISCONTINUED | OUTPATIENT
Start: 2021-08-17 | End: 2021-08-17 | Stop reason: HOSPADM

## 2021-08-17 RX ORDER — LIDOCAINE HYDROCHLORIDE 10 MG/ML
INJECTION, SOLUTION EPIDURAL; INFILTRATION; INTRACAUDAL; PERINEURAL AS NEEDED
Status: DISCONTINUED | OUTPATIENT
Start: 2021-08-17 | End: 2021-08-17 | Stop reason: SURG

## 2021-08-17 RX ORDER — HALOPERIDOL 5 MG/ML
0.25 INJECTION INTRAMUSCULAR ONCE AS NEEDED
Status: DISCONTINUED | OUTPATIENT
Start: 2021-08-17 | End: 2021-08-17 | Stop reason: HOSPADM

## 2021-08-17 RX ORDER — HYDROCHLOROTHIAZIDE 25 MG/1
25 TABLET ORAL DAILY
Status: DISCONTINUED | OUTPATIENT
Start: 2021-08-18 | End: 2021-08-18

## 2021-08-17 RX ORDER — DOCUSATE SODIUM 100 MG/1
100 CAPSULE, LIQUID FILLED ORAL 2 TIMES DAILY
Qty: 60 CAPSULE | Refills: 2 | Status: SHIPPED | OUTPATIENT
Start: 2021-08-17

## 2021-08-17 RX ORDER — OXYCODONE HYDROCHLORIDE 5 MG/1
5 TABLET ORAL EVERY 4 HOURS PRN
Status: DISCONTINUED | OUTPATIENT
Start: 2021-08-17 | End: 2021-08-18

## 2021-08-17 RX ORDER — MORPHINE SULFATE 4 MG/ML
4 INJECTION, SOLUTION INTRAMUSCULAR; INTRAVENOUS EVERY 10 MIN PRN
Status: DISCONTINUED | OUTPATIENT
Start: 2021-08-17 | End: 2021-08-17 | Stop reason: HOSPADM

## 2021-08-17 RX ADMIN — DEXAMETHASONE SODIUM PHOSPHATE 4 MG: 4 MG/ML VIAL (ML) INJECTION at 12:32:00

## 2021-08-17 RX ADMIN — SODIUM CHLORIDE, SODIUM LACTATE, POTASSIUM CHLORIDE, CALCIUM CHLORIDE: 600; 310; 30; 20 INJECTION, SOLUTION INTRAVENOUS at 12:32:00

## 2021-08-17 RX ADMIN — LIDOCAINE HYDROCHLORIDE 5 MG: 10 INJECTION, SOLUTION EPIDURAL; INFILTRATION; INTRACAUDAL; PERINEURAL at 12:19:00

## 2021-08-17 RX ADMIN — BUPIVACAINE HYDROCHLORIDE 1.6 ML: 7.5 INJECTION, SOLUTION INTRASPINAL at 12:22:00

## 2021-08-17 RX ADMIN — TRANEXAMIC ACID 1000 MG: 10 INJECTION, SOLUTION INTRAVENOUS at 12:35:00

## 2021-08-17 RX ADMIN — CEFAZOLIN SODIUM/WATER 2 G: 2 G/20 ML SYRINGE (ML) INTRAVENOUS at 12:32:00

## 2021-08-17 RX ADMIN — ONDANSETRON 4 MG: 2 INJECTION INTRAMUSCULAR; INTRAVENOUS at 12:32:00

## 2021-08-17 RX ADMIN — MIDAZOLAM HYDROCHLORIDE 1 MG: 1 INJECTION INTRAMUSCULAR; INTRAVENOUS at 12:17:00

## 2021-08-17 RX ADMIN — EPHEDRINE SULFATE 10 MG: 50 INJECTION INTRAVENOUS at 13:20:00

## 2021-08-17 RX ADMIN — LIDOCAINE HYDROCHLORIDE 40 MG: 10 INJECTION, SOLUTION EPIDURAL; INFILTRATION; INTRACAUDAL; PERINEURAL at 12:22:00

## 2021-08-17 RX ADMIN — SODIUM CHLORIDE, SODIUM LACTATE, POTASSIUM CHLORIDE, CALCIUM CHLORIDE: 600; 310; 30; 20 INJECTION, SOLUTION INTRAVENOUS at 14:12:00

## 2021-08-17 NOTE — INTERVAL H&P NOTE
Pre-op Diagnosis: right knee degenerative joint disease    The above referenced H&P was reviewed by Patricio Johnson PA-C on 8/17/2021, the patient was examined and no significant changes have occurred in the patient's condition since the H&P was performed.

## 2021-08-17 NOTE — PROGRESS NOTES
[facility]    Ortho Medical Progress Note     Rubia Grief Patient Status:  Outpatient in a Bed    1945 MRN [id number]   Location [address] [facility] Attending Xi Caballero MD   Hosp Day # 0 PCP Jessica Richards hemodynamics, GI status, reviewing labs, and assisting with anticoagulation monitoring as patient appropriate. Lizzie Brown 3, APN  Office [phone]  Cell [phone]  8/17/2021

## 2021-08-17 NOTE — ANESTHESIA PREPROCEDURE EVALUATION
Anesthesia PreOp Note    HPI:     Shandraim Glorious Jacob is a 67year old female who presents for preoperative consultation requested by: Xi Caballero MD    Date of Surgery: 8/17/2021    Procedure(s):  right total knee arthroplasty  Indication: right knee deg • Migraines    • OA (osteoarthritis) 5/7/2013   • Osteoporosis        Past Surgical History:   Procedure Laterality Date   • ANGIOPLASTY (CORONARY)  2014    stent   • HYSTERECTOMY  1990    partial hystero.    • TRINA LOCALIZATION WIRE 1 SITE LEFT (CPT=19281 patch, Transdermal, Once, Kole Krishnan MD  clonidine/epinephrine/ropivacaine/ketorolac (CERTS) pain cocktail, , Intra-articular, Once (Intra-Op), Kole Krishnan MD    No current [facility]-ordered outpatient medications on file.         Fosamax [Alendronat* Ex-Partner:       Emotionally Abused:       Physically Abused:       Sexually Abused:     Available pre-op labs reviewed.   Lab Results   Component Value Date    WBC 7.2 08/03/2021    RBC 4.68 08/03/2021    HGB 11.9 (L) 08/03/2021    HCT 38.1 08/03/2021 plan with:  CRNA      I have informed Shandraim Prabhjot Nails and/or legal guardian or family member of the nature of the anesthetic plan, benefits, risks including possible dental damage if relevant, major complications, and any alternative forms of anesthetic

## 2021-08-17 NOTE — ANESTHESIA PROCEDURE NOTES
Spinal Block  Performed by: Keya Merino CRNA  Authorized by: Vita Stubbs DO       General Information and Staff    Start Time:  8/17/2021 12:20 PM  End Time:  8/17/2021 12:22 PM  Anesthesiologist:  Vita Stubbs DO  CRNA:  Carlita Guzman

## 2021-08-17 NOTE — ANESTHESIA POSTPROCEDURE EVALUATION
Patient: Jt Puri    Procedure Summary     Date: 08/17/21 Room / Location: [address] [place] [address] MAIN OR 04 / [address] [place] [address] OR    Anesthesia Start: 7219 Anesthesia Stop:     Procedure: right total knee arthroplasty (Right Knee) Diagnosis: (right knee degenerative joint

## 2021-08-18 VITALS
OXYGEN SATURATION: 97 % | WEIGHT: 168 LBS | SYSTOLIC BLOOD PRESSURE: 125 MMHG | TEMPERATURE: 98 F | HEIGHT: 64 IN | BODY MASS INDEX: 28.68 KG/M2 | HEART RATE: 58 BPM | RESPIRATION RATE: 16 BRPM | DIASTOLIC BLOOD PRESSURE: 68 MMHG

## 2021-08-18 LAB
ALBUMIN SERPL-MCNC: 2.8 G/DL (ref 3.4–5)
ALBUMIN/GLOB SERPL: 0.8 {RATIO} (ref 1–2)
ALP LIVER SERPL-CCNC: 76 U/L
ALT SERPL-CCNC: 16 U/L
ANION GAP SERPL CALC-SCNC: 3 MMOL/L (ref 0–18)
AST SERPL-CCNC: 17 U/L (ref 15–37)
BILIRUB SERPL-MCNC: 0.4 MG/DL (ref 0.1–2)
BUN BLD-MCNC: 18 MG/DL (ref 7–18)
BUN/CREAT SERPL: 27.3 (ref 10–20)
CALCIUM BLD-MCNC: 8.6 MG/DL (ref 8.5–10.1)
CHLORIDE SERPL-SCNC: 107 MMOL/L (ref 98–112)
CO2 SERPL-SCNC: 30 MMOL/L (ref 21–32)
CREAT BLD-MCNC: 0.66 MG/DL
GLOBULIN PLAS-MCNC: 3.6 G/DL (ref 2.8–4.4)
GLUCOSE BLD-MCNC: 121 MG/DL (ref 70–99)
HCT VFR BLD AUTO: 31.7 %
HGB BLD-MCNC: 10.2 G/DL
M PROTEIN MFR SERPL ELPH: 6.4 G/DL (ref 6.4–8.2)
OSMOLALITY SERPL CALC.SUM OF ELEC: 293 MOSM/KG (ref 275–295)
POTASSIUM SERPL-SCNC: 4 MMOL/L (ref 3.5–5.1)
SODIUM SERPL-SCNC: 140 MMOL/L (ref 136–145)

## 2021-08-18 PROCEDURE — 97530 THERAPEUTIC ACTIVITIES: CPT

## 2021-08-18 PROCEDURE — 97116 GAIT TRAINING THERAPY: CPT

## 2021-08-18 PROCEDURE — 85014 HEMATOCRIT: CPT | Performed by: NURSE PRACTITIONER

## 2021-08-18 PROCEDURE — 85018 HEMOGLOBIN: CPT | Performed by: NURSE PRACTITIONER

## 2021-08-18 PROCEDURE — 97110 THERAPEUTIC EXERCISES: CPT

## 2021-08-18 PROCEDURE — 80053 COMPREHEN METABOLIC PANEL: CPT | Performed by: NURSE PRACTITIONER

## 2021-08-18 PROCEDURE — 97162 PT EVAL MOD COMPLEX 30 MIN: CPT

## 2021-08-18 RX ORDER — LOSARTAN POTASSIUM 25 MG/1
25 TABLET ORAL DAILY
Qty: 30 TABLET | Refills: 11 | Status: SHIPPED | COMMUNITY
Start: 2021-08-18

## 2021-08-18 RX ORDER — HYDROCHLOROTHIAZIDE 25 MG/1
25 TABLET ORAL DAILY
Refills: 0 | Status: SHIPPED | COMMUNITY
Start: 2021-08-18

## 2021-08-18 NOTE — PHYSICAL THERAPY NOTE
PHYSICAL THERAPY KNEE EVALUATION - INPATIENT       Room Number: Room 8/Room 8-A  Evaluation Date: 8/18/2021  Type of Evaluation: Initial  Physician Order: PT Eval and Treat    Presenting Problem: right TKA  Reason for Therapy: Mobility Dysfunction and Disc needs in reach, ice on knee and son in room. All questions answered. RN aware. PM: Patient presents resting in bed, reports taking Tylenol and pain is 3/10 at rest, 8/10 with activity> She needs CGA for bed mobility for right LE only.  CGA for sit to sta (coronary artery disease) 1/10/2014    S/p LHC/ PCI DALY to RCA    • Chest pain 1/10/2014   • Coronary atherosclerosis    • Disorder of thyroid    • High blood pressure    • High cholesterol    • History of blood transfusion 1991    prior to hysterectomy Cognitive Status:  wnl    RANGE OF MOTION AND STRENGTH ASSESSMENT  Upper extremity ROM and strength are within functional limits     Lower extremity ROM is within functional limits right knee 5-80* AM; 5-85* PM    Lower extremity strength is within functio 125/55    Bed Mobility: min A for RLE AM; CGA PM    Transfers: min A AM, CGA PM with cues for hand and RLE placement    Exercise/Education Provided:  Bed mobility  Functional activity tolerated  Gait training  Neuromuscular re-educate  ROM  Strengthening

## 2021-08-18 NOTE — PROGRESS NOTES
[facility]    Progress Note    Christa Reilly Patient Status:  Outpatient in a Bed    1945 MRN [id number]   Location [facility] Attending Dorthula Paget, MD   Hosp Day # 0 PCP Kim Loco MD       Subjective:   Nataly Mccray KESHIA  8/18/2021    Josephine Mederos PA-C  Physician Assistant for Dr. Cesar Salguero: [phone]  F: [phone]  C: [phone]

## 2021-08-18 NOTE — FACE TO FACE NOTE
Face to Face Encounter    I (or a non-physician practitioner working in collaboration with me) had a face to face encounter with this patient during which a medical condition was addressed which is the primary reason for home health care on : 8/18/2021

## 2021-08-18 NOTE — CM/SW NOTE
08/18/21 1300   CM/SW Referral Data   Referral Source Physician   Reason for Referral Discharge planning   Informant Patient; Son   Patient Info   Patient's Current Mental Status at Time of Assessment Alert;Oriented   Patient's [address]   N

## 2021-08-18 NOTE — PROGRESS NOTES
[facility]    Ortho Medical Progress Note     Landon Wilson Patient Status:  Outpatient in a Bed    1945 MRN [id number]   Location [facility] Attending Albert Henry MD   Hosp Day # 0 PCP MD Stacey Carreno ALB 2.8 (L) 08/18/2021    ALKPHO 76 08/18/2021    BILT 0.4 08/18/2021    TP 6.4 08/18/2021    AST 17 08/18/2021    ALT 16 08/18/2021    PTT 27.6 01/11/2014    INR 1.01 01/11/2014    PT 12.9 01/11/2014    T4F 1.12 10/20/2015    TSH 5.200 (H) 10/20/2015

## 2021-08-19 NOTE — OPERATIVE REPORT
[address] [place] [address]  OPERATIVE REPORT  PATIENT NAME: Siddhartha Craft  MR#: [id number]  PMD: Keshia Morgan  DATE OF PROCEDURE: 8/17/2021  PREOPERATIVE DIAGNOSIS: Degenerative Arthritis right knee  POSTOPER day surgery program on Tuesday, August 17, 2021. Following proper identification in the preoperative holding area with confirmation of the above-stated procedure, the patient was brought to the main operating room suite. Procedure was confirmed.  She receiv rotation was based between the medial and middle third of the tibial tubercle. This was provisionally pinned. A size 7 right EMPOWR 3D Non-Porous Femur femoral component was placed along with a 12 mm polyethylene spacer.  With this in place, the knee was ab

## 2021-12-30 ENCOUNTER — HOSPITAL ENCOUNTER (OUTPATIENT)
Dept: MAMMOGRAPHY | Facility: HOSPITAL | Age: 76
Discharge: HOME | End: 2021-12-30
Attending: FAMILY MEDICINE
Payer: COMMERCIAL

## 2021-12-30 DIAGNOSIS — Z98.890 S/P BREAST BIOPSY, LEFT: ICD-10-CM

## 2021-12-30 DIAGNOSIS — R92.1 BREAST CALCIFICATION SEEN ON MAMMOGRAM: ICD-10-CM

## 2021-12-30 DIAGNOSIS — Z12.31 ENCOUNTER FOR SCREENING MAMMOGRAM FOR MALIGNANT NEOPLASM OF BREAST: ICD-10-CM

## 2021-12-30 PROCEDURE — 77062 BREAST TOMOSYNTHESIS BI: CPT | Performed by: FAMILY MEDICINE

## 2021-12-30 PROCEDURE — 77066 DX MAMMO INCL CAD BI: CPT | Performed by: FAMILY MEDICINE

## 2022-05-28 ENCOUNTER — HOSPITAL ENCOUNTER (OUTPATIENT)
Facility: HOSPITAL | Age: 77
Setting detail: OBSERVATION
Discharge: HOME | End: 2022-05-29
Attending: EMERGENCY MEDICINE | Admitting: HOSPITALIST
Payer: COMMERCIAL

## 2022-05-28 DIAGNOSIS — R07.9 ACUTE CHEST PAIN: Primary | ICD-10-CM

## 2022-05-28 LAB
ALBUMIN SERPL-MCNC: 3.4 G/DL (ref 3.4–5)
ALBUMIN/GLOB SERPL: 0.8 {RATIO} (ref 1–2)
ALP LIVER SERPL-CCNC: 101 U/L
ALT SERPL-CCNC: 20 U/L
ANION GAP SERPL CALC-SCNC: 5 MMOL/L (ref 0–18)
AST SERPL-CCNC: 17 U/L (ref 15–37)
BASOPHILS # BLD AUTO: 0.03 X10(3) UL (ref 0–0.2)
BASOPHILS NFR BLD AUTO: 0.3 %
BILIRUB SERPL-MCNC: 0.2 MG/DL (ref 0.1–2)
BILIRUB UR QL STRIP.AUTO: NEGATIVE
BUN BLD-MCNC: 24 MG/DL (ref 7–18)
CALCIUM BLD-MCNC: 9.4 MG/DL (ref 8.5–10.1)
CHLORIDE SERPL-SCNC: 105 MMOL/L (ref 98–112)
CLARITY UR REFRACT.AUTO: CLEAR
CO2 SERPL-SCNC: 29 MMOL/L (ref 21–32)
CREAT BLD-MCNC: 1.05 MG/DL
EOSINOPHIL # BLD AUTO: 0.28 X10(3) UL (ref 0–0.7)
EOSINOPHIL NFR BLD AUTO: 3.1 %
ERYTHROCYTE [DISTWIDTH] IN BLOOD BY AUTOMATED COUNT: 15.1 %
GLOBULIN PLAS-MCNC: 4.2 G/DL (ref 2.8–4.4)
GLUCOSE BLD-MCNC: 133 MG/DL (ref 70–99)
GLUCOSE UR STRIP.AUTO-MCNC: NEGATIVE MG/DL
HCT VFR BLD AUTO: 37.3 %
HGB BLD-MCNC: 11.7 G/DL
IMM GRANULOCYTES # BLD AUTO: 0.04 X10(3) UL (ref 0–1)
IMM GRANULOCYTES NFR BLD: 0.4 %
KETONES UR STRIP.AUTO-MCNC: NEGATIVE MG/DL
LYMPHOCYTES # BLD AUTO: 1.97 X10(3) UL (ref 1–4)
LYMPHOCYTES NFR BLD AUTO: 21.9 %
MCH RBC QN AUTO: 25.5 PG (ref 26–34)
MCHC RBC AUTO-ENTMCNC: 31.4 G/DL (ref 31–37)
MCV RBC AUTO: 81.4 FL
MONOCYTES # BLD AUTO: 0.77 X10(3) UL (ref 0.1–1)
MONOCYTES NFR BLD AUTO: 8.6 %
NEUTROPHILS # BLD AUTO: 5.9 X10 (3) UL (ref 1.5–7.7)
NEUTROPHILS # BLD AUTO: 5.9 X10(3) UL (ref 1.5–7.7)
NEUTROPHILS NFR BLD AUTO: 65.7 %
NITRITE UR QL STRIP.AUTO: NEGATIVE
OSMOLALITY SERPL CALC.SUM OF ELEC: 294 MOSM/KG (ref 275–295)
PH UR STRIP.AUTO: 7 [PH] (ref 5–8)
PLATELET # BLD AUTO: 226 10(3)UL (ref 150–450)
POTASSIUM SERPL-SCNC: 3.3 MMOL/L (ref 3.5–5.1)
PROT SERPL-MCNC: 7.6 G/DL (ref 6.4–8.2)
PROT UR STRIP.AUTO-MCNC: NEGATIVE MG/DL
RBC # BLD AUTO: 4.58 X10(6)UL
RBC UR QL AUTO: NEGATIVE
SODIUM SERPL-SCNC: 139 MMOL/L (ref 136–145)
SP GR UR STRIP.AUTO: 1.01 (ref 1–1.03)
UROBILINOGEN UR STRIP.AUTO-MCNC: <2 MG/DL
WBC # BLD AUTO: 9 X10(3) UL (ref 4–11)

## 2022-05-28 RX ORDER — CARVEDILOL 6.25 MG/1
6.25 TABLET ORAL 2 TIMES DAILY
COMMUNITY
Start: 2022-05-23 | End: 2023-05-23

## 2022-05-29 ENCOUNTER — APPOINTMENT (OUTPATIENT)
Dept: CT IMAGING | Facility: HOSPITAL | Age: 77
End: 2022-05-29
Attending: EMERGENCY MEDICINE
Payer: COMMERCIAL

## 2022-05-29 VITALS
OXYGEN SATURATION: 95 % | TEMPERATURE: 98 F | RESPIRATION RATE: 18 BRPM | SYSTOLIC BLOOD PRESSURE: 148 MMHG | HEART RATE: 69 BPM | WEIGHT: 169.31 LBS | BODY MASS INDEX: 29 KG/M2 | DIASTOLIC BLOOD PRESSURE: 74 MMHG

## 2022-05-29 PROBLEM — R07.9 ACUTE CHEST PAIN: Status: ACTIVE | Noted: 2022-05-29

## 2022-05-29 PROBLEM — E03.9 ACQUIRED HYPOTHYROIDISM: Status: ACTIVE | Noted: 2022-05-29

## 2022-05-29 LAB
ATRIAL RATE: 77 BPM
ATRIAL RATE: 77 BPM
P AXIS: 63 DEGREES
P AXIS: 63 DEGREES
P-R INTERVAL: 154 MS
P-R INTERVAL: 154 MS
POTASSIUM SERPL-SCNC: 3.8 MMOL/L (ref 3.5–5.1)
Q-T INTERVAL: 416 MS
Q-T INTERVAL: 416 MS
QRS DURATION: 74 MS
QRS DURATION: 74 MS
QTC CALCULATION (BEZET): 470 MS
QTC CALCULATION (BEZET): 470 MS
R AXIS: 23 DEGREES
R AXIS: 23 DEGREES
SARS-COV-2 RNA RESP QL NAA+PROBE: NOT DETECTED
T AXIS: 61 DEGREES
T AXIS: 61 DEGREES
TROPONIN I HIGH SENSITIVITY: 12 NG/L
TROPONIN I HIGH SENSITIVITY: 6 NG/L
VENTRICULAR RATE: 77 BPM
VENTRICULAR RATE: 77 BPM

## 2022-05-29 PROCEDURE — 99220 INITIAL OBSERVATION CARE,LEVL III: CPT | Performed by: HOSPITALIST

## 2022-05-29 PROCEDURE — 70450 CT HEAD/BRAIN W/O DYE: CPT | Performed by: EMERGENCY MEDICINE

## 2022-05-29 RX ORDER — LOSARTAN POTASSIUM 25 MG/1
25 TABLET ORAL DAILY
Status: DISCONTINUED | OUTPATIENT
Start: 2022-05-29 | End: 2022-05-29

## 2022-05-29 RX ORDER — LEVOTHYROXINE SODIUM 0.07 MG/1
75 TABLET ORAL
Status: DISCONTINUED | OUTPATIENT
Start: 2022-05-29 | End: 2022-05-29

## 2022-05-29 RX ORDER — CARVEDILOL 6.25 MG/1
6.25 TABLET ORAL 2 TIMES DAILY WITH MEALS
Status: DISCONTINUED | OUTPATIENT
Start: 2022-05-29 | End: 2022-05-29

## 2022-05-29 RX ORDER — ONDANSETRON 2 MG/ML
4 INJECTION INTRAMUSCULAR; INTRAVENOUS EVERY 6 HOURS PRN
Status: DISCONTINUED | OUTPATIENT
Start: 2022-05-29 | End: 2022-05-29

## 2022-05-29 RX ORDER — POLYETHYLENE GLYCOL 3350 17 G/17G
17 POWDER, FOR SOLUTION ORAL DAILY PRN
Status: DISCONTINUED | OUTPATIENT
Start: 2022-05-29 | End: 2022-05-29

## 2022-05-29 RX ORDER — MELATONIN
3 NIGHTLY PRN
Status: DISCONTINUED | OUTPATIENT
Start: 2022-05-29 | End: 2022-05-29

## 2022-05-29 RX ORDER — SODIUM PHOSPHATE, DIBASIC AND SODIUM PHOSPHATE, MONOBASIC 7; 19 G/133ML; G/133ML
1 ENEMA RECTAL ONCE AS NEEDED
Status: DISCONTINUED | OUTPATIENT
Start: 2022-05-29 | End: 2022-05-29

## 2022-05-29 RX ORDER — POTASSIUM CHLORIDE 20 MEQ/1
40 TABLET, EXTENDED RELEASE ORAL ONCE
Status: COMPLETED | OUTPATIENT
Start: 2022-05-29 | End: 2022-05-29

## 2022-05-29 RX ORDER — METOCLOPRAMIDE HYDROCHLORIDE 5 MG/ML
5 INJECTION INTRAMUSCULAR; INTRAVENOUS EVERY 8 HOURS PRN
Status: DISCONTINUED | OUTPATIENT
Start: 2022-05-29 | End: 2022-05-29

## 2022-05-29 RX ORDER — ASPIRIN 81 MG/1
324 TABLET, CHEWABLE ORAL ONCE
Status: COMPLETED | OUTPATIENT
Start: 2022-05-29 | End: 2022-05-29

## 2022-05-29 RX ORDER — HYDROCHLOROTHIAZIDE 25 MG/1
25 TABLET ORAL DAILY
Status: DISCONTINUED | OUTPATIENT
Start: 2022-05-29 | End: 2022-05-29

## 2022-05-29 RX ORDER — POTASSIUM CHLORIDE 20 MEQ/1
40 TABLET, EXTENDED RELEASE ORAL ONCE
Status: CANCELLED | OUTPATIENT
Start: 2022-05-29 | End: 2022-05-29

## 2022-05-29 RX ORDER — ACETAMINOPHEN 325 MG/1
650 TABLET ORAL EVERY 6 HOURS PRN
Status: DISCONTINUED | OUTPATIENT
Start: 2022-05-29 | End: 2022-05-29

## 2022-05-29 RX ORDER — PANTOPRAZOLE SODIUM 40 MG/1
40 TABLET, DELAYED RELEASE ORAL
Status: DISCONTINUED | OUTPATIENT
Start: 2022-05-29 | End: 2022-05-29

## 2022-05-29 RX ORDER — HEPARIN SODIUM 5000 [USP'U]/ML
5000 INJECTION, SOLUTION INTRAVENOUS; SUBCUTANEOUS EVERY 8 HOURS SCHEDULED
Status: DISCONTINUED | OUTPATIENT
Start: 2022-05-29 | End: 2022-05-29

## 2022-05-29 RX ORDER — BISACODYL 10 MG
10 SUPPOSITORY, RECTAL RECTAL
Status: DISCONTINUED | OUTPATIENT
Start: 2022-05-29 | End: 2022-05-29

## 2022-05-29 RX ORDER — SENNOSIDES 8.6 MG
17.2 TABLET ORAL NIGHTLY PRN
Status: DISCONTINUED | OUTPATIENT
Start: 2022-05-29 | End: 2022-05-29

## 2022-05-29 RX ORDER — ATORVASTATIN CALCIUM 20 MG/1
20 TABLET, FILM COATED ORAL NIGHTLY
Status: DISCONTINUED | OUTPATIENT
Start: 2022-05-29 | End: 2022-05-29

## 2022-05-29 NOTE — PLAN OF CARE
Assumed care of patient @ 0730. No acute distress noted. Denies chest pain or shortness of breath. No c/o dizziness. A&Ox4. VSS on RA. NSR on tele. VTE prophylaxis. Cardiac electrolyte replacement- replaced potassium per protocol. Up at breann. All needs met. Poc: cards consult. Problem: Patient/Family Goals  Goal: Patient/Family Long Term Goal  Description: Patient's Long Term Goal: go home  Interventions:  - per hospitalist/cards  - monitor cardiac status  - See additional Care Plan goals for specific interventions  Outcome: Progressing  Goal: Patient/Family Short Term Goal  Description: Patient's Short Term Goal: no pain   Interventions:   - pain management  - See additional Care Plan goals for specific interventions  Outcome: Progressing  Problem: CARDIOVASCULAR - ADULT  Goal: Maintains optimal cardiac output and hemodynamic stability  Description: INTERVENTIONS:  - Monitor vital signs, rhythm, and trends  - Monitor for bleeding, hypotension and signs of decreased cardiac output  - Evaluate effectiveness of vasoactive medications to optimize hemodynamic stability  - Monitor arterial and/or venous puncture sites for bleeding and/or hematoma  - Assess quality of pulses, skin color and temperature  - Assess for signs of decreased coronary artery perfusion - ex.  Angina  - Evaluate fluid balance, assess for edema, trend weights  Outcome: Progressing  Goal: Absence of cardiac arrhythmias or at baseline  Description: INTERVENTIONS:  - Continuous cardiac monitoring, monitor vital signs, obtain 12 lead EKG if indicated  - Evaluate effectiveness of antiarrhythmic and heart rate control medications as ordered  - Initiate emergency measures for life threatening arrhythmias  - Monitor electrolytes and administer replacement therapy as ordered  Outcome: Progressing

## 2022-05-29 NOTE — PROGRESS NOTES
NURSING ADMISSION NOTE      Patient admitted via Cart  Oriented to room. Safety precautions initiated. Bed in low position. Call light in reach. Patient admitted in stable condition. Admission navigator completed by this RN. Pt A&O4. Room air. NSR on tele. Cardiac diet. Continent of bowel and bladder. Denies pain and nausea. Tele placed. IV flushed and capped. Safety precautions in place. All needs met at this time.

## 2022-05-29 NOTE — PROGRESS NOTES
NURSING DISCHARGE NOTE    Discharged Home via Ambulatory. Accompanied by Spouse  Belongings Taken by patient/family. Pt discharged home. Repeat trop normal. No c/o chest pain or sob. IV and tele removed. Discharge paperwork reviewed with patient. All needs met.

## 2022-05-29 NOTE — ED QUICK NOTES
Orders for admission, patient is aware of plan and ready to go upstairs. Any questions, please call ED RN  Kathi Qureshi at extension [phone]. Vaccinated?  Yes  Type of COVID test sent: Rapid PCR-Negative  COVID Suspicion level: Low      Titratable drug(s) infusing:n/a  Rate:    LOC at time of transport:AX[facility]    Other pertinent information:    CIWA score=N/A  NIH score=N/A

## 2022-05-29 NOTE — ED INITIAL ASSESSMENT (HPI)
76YF c/c of headache,  HTN Pt state that she felt near syncopal eariler today with headache Pt state that the syncopal has past but remains having a headache

## 2022-12-28 ENCOUNTER — HOSPITAL ENCOUNTER (OUTPATIENT)
Dept: MAMMOGRAPHY | Age: 77
Discharge: HOME | End: 2022-12-28
Attending: FAMILY MEDICINE
Payer: COMMERCIAL

## 2022-12-28 DIAGNOSIS — R92.1 BREAST CALCIFICATION SEEN ON MAMMOGRAM: ICD-10-CM

## 2022-12-28 DIAGNOSIS — Z98.890 H/O LEFT BREAST BIOPSY: ICD-10-CM

## 2022-12-28 DIAGNOSIS — Z12.31 ENCOUNTER FOR SCREENING MAMMOGRAM FOR MALIGNANT NEOPLASM OF BREAST: ICD-10-CM

## 2022-12-28 PROCEDURE — 77067 SCR MAMMO BI INCL CAD: CPT | Performed by: FAMILY MEDICINE

## 2022-12-28 PROCEDURE — 77063 BREAST TOMOSYNTHESIS BI: CPT | Performed by: FAMILY MEDICINE

## 2023-01-12 ENCOUNTER — OFFICE VISIT (OUTPATIENT)
Facility: LOCATION | Age: 78
End: 2023-01-12
Payer: COMMERCIAL

## 2023-01-12 DIAGNOSIS — H93.293 ABNORMAL AUDITORY PERCEPTION OF BOTH EARS: Primary | ICD-10-CM

## 2023-01-12 DIAGNOSIS — H91.93 AUDITORY IMPAIRMENT, BILATERAL: ICD-10-CM

## 2023-01-12 DIAGNOSIS — H61.22 CERUMINOSIS, LEFT: Primary | ICD-10-CM

## 2023-01-12 PROCEDURE — 99204 OFFICE O/P NEW MOD 45 MIN: CPT | Performed by: OTOLARYNGOLOGY

## 2023-01-12 PROCEDURE — 92567 TYMPANOMETRY: CPT | Performed by: AUDIOLOGIST

## 2023-01-12 PROCEDURE — 92557 COMPREHENSIVE HEARING TEST: CPT | Performed by: AUDIOLOGIST

## 2023-01-12 NOTE — PROGRESS NOTES
Kelsi Jay was seen for an audiometric evaluation and tympanogram today. Referred back to physician.     Marino Haq MS, CCC-A

## 2023-02-15 ENCOUNTER — HOSPITAL ENCOUNTER (OUTPATIENT)
Dept: MAMMOGRAPHY | Facility: HOSPITAL | Age: 78
Discharge: HOME | End: 2023-02-15
Attending: FAMILY MEDICINE
Payer: COMMERCIAL

## 2023-02-15 DIAGNOSIS — R92.2 INCONCLUSIVE MAMMOGRAM: ICD-10-CM

## 2023-02-15 PROCEDURE — 76642 ULTRASOUND BREAST LIMITED: CPT | Performed by: FAMILY MEDICINE

## 2023-02-15 NOTE — IMAGING NOTE
This Breast Care RN assisted Dr. Bonnie Devine with recommendation for a left 1 site ultrasound guided biopsy for mass. Procedure reviewed and all questions answered. Emotional and educational support given. On the day of the biopsy, pt instructed to take Tylenol 1000mg PO, eat a light meal & bring or wear a sports bra. Post biopsy care also reviewed with pt to include NO lifting more than 5lbs, no exercising or housework (limit upper body movement) for 24-48 hrs post biopsy. Pt denies liver disease, bleeding disorders, blood thinners, and chemo. Pt verbalized understanding. Our breast center schedulers will be calling to schedule an appt that is convenient for pt.

## 2023-02-21 ENCOUNTER — HOSPITAL ENCOUNTER (OUTPATIENT)
Dept: MAMMOGRAPHY | Facility: HOSPITAL | Age: 78
Discharge: HOME | End: 2023-02-21
Attending: FAMILY MEDICINE
Payer: COMMERCIAL

## 2023-02-21 DIAGNOSIS — R92.8 OTHER ABNORMAL AND INCONCLUSIVE FINDINGS ON DIAGNOSTIC IMAGING OF BREAST: ICD-10-CM

## 2023-02-21 DIAGNOSIS — N63.0 BREAST MASS: ICD-10-CM

## 2023-02-21 PROCEDURE — 88360 TUMOR IMMUNOHISTOCHEM/MANUAL: CPT | Performed by: FAMILY MEDICINE

## 2023-02-21 PROCEDURE — 77065 DX MAMMO INCL CAD UNI: CPT | Performed by: FAMILY MEDICINE

## 2023-02-21 PROCEDURE — 88305 TISSUE EXAM BY PATHOLOGIST: CPT | Performed by: FAMILY MEDICINE

## 2023-02-21 PROCEDURE — 88344 IMHCHEM/IMCYTCHM EA MLT ANTB: CPT | Performed by: FAMILY MEDICINE

## 2023-02-21 PROCEDURE — 88342 IMHCHEM/IMCYTCHM 1ST ANTB: CPT | Performed by: FAMILY MEDICINE

## 2023-02-21 PROCEDURE — 19083 BX BREAST 1ST LESION US IMAG: CPT | Performed by: FAMILY MEDICINE

## 2023-02-23 NOTE — IMAGING NOTE
1524: Spoke with Umu Mariee and her . Ms. Nichelle Ruelas is  post ultrasound guided left breast biopsy. Introduced myself and informed Ms. Nichelle Ruelas and her  of the purpose of my call. Name and date of birth verified by patient. Reinforced post biopsy care and instruction. Ms. Nichelle Ruelas denies any issues with biopsy site- bleeding, drainage, redness, tenderness. Pathology results and recommendations discussed as follows:   Final Diagnosis:   Left breast mass  ultrasound-guided core biopsies:  -Invasive ductal carcinoma. Dr. Letty Mcgrath referring to Dr. Curt Burch. Ms. Nichelle Ruelas instructed to make an appointment with Dr. Jerald Stein. Dr. Madera Stake office phone number provided. Encouraged Ms. Sena to follow-up with Dr. Amelia Felix if she has questions/concerns. Kelsi Sena verbalized understanding and agreement to the above.

## 2023-02-23 NOTE — IMAGING NOTE
1425: Spoke with ALISHA Henry in Dr. Diana Peralta office. Reported pathology results for Ashwini Sena's left breast biopsy performed Tuesday, February 21 as follows:    Final Diagnosis:   Left breast mass  ultrasound-guided core biopsies:  -Invasive ductal carcinoma. Per EMR biopsy order dated 2-15-23 - Dr. Letty Mcgrath referring to Dr. Lula Smith. ALISHA Henry requested pathology report to be faxed to Dr. Evangelina Sanchez office- fax # [phone]:  [facility]- to fax pathology final report

## 2023-02-24 ENCOUNTER — NURSE NAVIGATOR ENCOUNTER (OUTPATIENT)
Dept: HEMATOLOGY/ONCOLOGY | Facility: HOSPITAL | Age: 78
End: 2023-02-24

## 2023-02-24 NOTE — PROGRESS NOTES
Phoned patient and we discussed newly diagnosed breast cancer. Introduced myself as one of the breast nurse navigators and explained the role of the breast nurse navigator. Explained the role of the physicians on her breast cancer care team. Reviewed over pathology report and discussed the type of breast cancer, grade, and ER/NM and Her 2. Briefly discussed breast cancer treatments including surgery, radiation, hormone therapy, and chemotherapy. Explained the breast cancer multidisciplinary meeting and that her case will be discussed. Patient given my contact information to call with any further questions or concerns.

## 2023-02-28 ENCOUNTER — OFFICE VISIT (OUTPATIENT)
Dept: HEMATOLOGY/ONCOLOGY | Facility: HOSPITAL | Age: 78
End: 2023-02-28
Attending: INTERNAL MEDICINE
Payer: COMMERCIAL

## 2023-02-28 ENCOUNTER — OFFICE VISIT (OUTPATIENT)
Dept: HEMATOLOGY/ONCOLOGY | Facility: HOSPITAL | Age: 78
End: 2023-02-28
Attending: SURGERY
Payer: COMMERCIAL

## 2023-02-28 ENCOUNTER — NURSE NAVIGATOR ENCOUNTER (OUTPATIENT)
Dept: HEMATOLOGY/ONCOLOGY | Facility: HOSPITAL | Age: 78
End: 2023-02-28

## 2023-02-28 ENCOUNTER — OFFICE VISIT (OUTPATIENT)
Facility: LOCATION | Age: 78
End: 2023-02-28
Payer: COMMERCIAL

## 2023-02-28 VITALS
HEART RATE: 67 BPM | TEMPERATURE: 98 F | DIASTOLIC BLOOD PRESSURE: 83 MMHG | OXYGEN SATURATION: 99 % | SYSTOLIC BLOOD PRESSURE: 141 MMHG | WEIGHT: 175.5 LBS | RESPIRATION RATE: 18 BRPM | BODY MASS INDEX: 30 KG/M2

## 2023-02-28 DIAGNOSIS — C50.412 MALIGNANT NEOPLASM OF UPPER-OUTER QUADRANT OF LEFT BREAST IN FEMALE, ESTROGEN RECEPTOR NEGATIVE (HCC): Primary | ICD-10-CM

## 2023-02-28 DIAGNOSIS — C50.812 MALIGNANT NEOPLASM OF OVERLAPPING SITES OF LEFT BREAST IN FEMALE, ESTROGEN RECEPTOR NEGATIVE (HCC): Primary | ICD-10-CM

## 2023-02-28 DIAGNOSIS — Z17.1 MALIGNANT NEOPLASM OF UPPER-OUTER QUADRANT OF LEFT BREAST IN FEMALE, ESTROGEN RECEPTOR NEGATIVE (HCC): Primary | ICD-10-CM

## 2023-02-28 DIAGNOSIS — Z17.1 MALIGNANT NEOPLASM OF OVERLAPPING SITES OF LEFT BREAST IN FEMALE, ESTROGEN RECEPTOR NEGATIVE (HCC): Primary | ICD-10-CM

## 2023-02-28 PROCEDURE — 3077F SYST BP >= 140 MM HG: CPT | Performed by: INTERNAL MEDICINE

## 2023-02-28 PROCEDURE — 3079F DIAST BP 80-89 MM HG: CPT | Performed by: INTERNAL MEDICINE

## 2023-02-28 PROCEDURE — 99244 OFF/OP CNSLTJ NEW/EST MOD 40: CPT | Performed by: SURGERY

## 2023-02-28 PROCEDURE — 99211 OFF/OP EST MAY X REQ PHY/QHP: CPT

## 2023-02-28 PROCEDURE — 99205 OFFICE O/P NEW HI 60 MIN: CPT | Performed by: INTERNAL MEDICINE

## 2023-02-28 NOTE — PROGRESS NOTES
Asked for an open MRI, called [facility] that does open, however they do not offer open MRI for breast.  Informed patient of this. Scheduled echo for 3/7 and breast MRI for 3/13. Patient looking for earlier appointment. Plans to call around to have a sooner date. Asked to let me know by 3/1 if she is going to an outside facility for the images; then will cancel them at [facility]. Will need to get CDs with images if done somewhere else.

## 2023-02-28 NOTE — PROGRESS NOTES
Dx: Malignant neoplasm of overlapping sites of left breast in female, estrogen receptor negative. Patient presents to clinic for breast consultation. Ultrasound - guided 12 gauge - needle core biopsies (LFT Breast)  performed on 2/21/23. Invasive ductal carcinoma. Patient is here to discuss pathology results and surgical options. Appointment with oncologist Dr. Angel Marshall was earlier today. Patient reports biopsy site healing well some intermittent itching and some redness. Steri-strips still intact. Denies any bleeding, discharge, fever, or chills. Patient has some LFT axilla discomfort. Medication and allergies reviewed and updated.

## 2023-02-28 NOTE — PROGRESS NOTES
Met with patient,  and daughter in clinic. Introduced myself as one the breast nurse navigators and explained the role of the breast nurse navigator and coordination of care. Explained the role of all of the physicians involved in her care including the surgeon, medical oncologist, radiation oncologist, and possibly plastic surgeon. Reviewed over the patients pathology report and discussed receptors including ER/[place]/ and Her 2. Patient was given the breast cancer treatment handbook and reviewed over how to use this resource. Discussed the breast multidisciplinary conference and that her case will be discussed. Patient was given the contact information for the social workers at the [facility] and resources for support as requested. Breast cancer journey map provided to patient. Next step in care will be to schedule echo, patient will call tomorrow with plans for when starting chemo; will schedule port once chemo start date is decided. Provided information on support group at THE [facility]. Discuss wig options and how to obtain a free wig and also through insurance. Pt was provided with breast nurse navigator contact information and was encouraged to phone with any other questions or concerns.

## 2023-02-28 NOTE — PROGRESS NOTES
Education Record    Learner:  Patient/ spouse/ daughter    Disease / [place] breast cancer       Barriers / Limitations:  None   Comments:    Method:  Discussion   Comments:    General Topics:  Plan of care reviewed   Comments:    Outcome:  Shows understanding   Comments:  Feeling ok from the biopsy, steri strips in place, some itching. Pt is active.

## 2023-03-02 RX ORDER — DIAZEPAM 10 MG/1
10 TABLET ORAL AS NEEDED
Qty: 1 TABLET | Refills: 0 | Status: SHIPPED | OUTPATIENT
Start: 2023-03-02

## 2023-03-06 ENCOUNTER — NURSE NAVIGATOR ENCOUNTER (OUTPATIENT)
Dept: HEMATOLOGY/ONCOLOGY | Facility: HOSPITAL | Age: 78
End: 2023-03-06

## 2023-03-06 NOTE — PROGRESS NOTES
Patient's spouse called stating that he spoke to his [employer] agent and that the BRCA 1 BRCA 2 is not covered with his insurance for the genetics appointment and would like his wife's appointment with Umer johns for tomorrow. He stated once I get his wife's appointment for the BRCA 1 and BRCA 2 tests pre-approved, to call him. I informed him that, unfortunately, I do not do the pre-approvals, but I will contact Joanna Herzog and find out who we can contact for the pre-approvals. He thanked me for the assistance. Pt was provided with the breast nurse navigators contact information and was encouraged to phone with any other questions or concerns.

## 2023-03-06 NOTE — PROGRESS NOTES
Spoke to patient's  and patient multiple times today in regards to genetics appointment and canceled appointment for tomorrow, Tuesday March 7, 2023. He stated that he called their [employer] PPO and stated that they needed a \"pre-approval or pre-authorization for BRCA 1 and BRCA 2\" from me. I stated that I didn't do pre-approvals and I stated that I would contact Joanna Herzog in regards to their request and I received a message back from [facility] and she stated that she would need to have her consultation with the patient to make the determination for either BRCA testing or for the panel to be sent for testing and that she did not do pre-approvals for patients. He stated that he did not want to pay for the consultation and he was stuck in a \"Catch 22\" and I offered the help of the [employer] for financial assistance for genetics and he accepted. He inquired about his wife's appointment with chemotherapy education if it was 1:1 and I confirmed it was. He thanked me for the phone calls back and assistance. Pt was provided with the breast nurse navigators contact information and was encouraged to phone with any other questions or concerns.

## 2023-03-07 ENCOUNTER — APPOINTMENT (OUTPATIENT)
Dept: HEMATOLOGY/ONCOLOGY | Facility: HOSPITAL | Age: 78
End: 2023-03-07
Attending: SURGERY
Payer: COMMERCIAL

## 2023-03-07 ENCOUNTER — LAB ENCOUNTER (OUTPATIENT)
Dept: LAB | Facility: HOSPITAL | Age: 78
End: 2023-03-07
Attending: INTERNAL MEDICINE
Payer: COMMERCIAL

## 2023-03-07 ENCOUNTER — APPOINTMENT (OUTPATIENT)
Dept: GENETICS | Facility: HOSPITAL | Age: 78
End: 2023-03-07
Attending: SURGERY
Payer: COMMERCIAL

## 2023-03-07 DIAGNOSIS — C50.412 MALIGNANT NEOPLASM OF UPPER-OUTER QUADRANT OF LEFT BREAST IN FEMALE, ESTROGEN RECEPTOR NEGATIVE (HCC): ICD-10-CM

## 2023-03-07 DIAGNOSIS — Z17.1 MALIGNANT NEOPLASM OF UPPER-OUTER QUADRANT OF LEFT BREAST IN FEMALE, ESTROGEN RECEPTOR NEGATIVE (HCC): ICD-10-CM

## 2023-03-07 LAB
BASOPHILS # BLD AUTO: 0.03 X10(3) UL (ref 0–0.2)
BASOPHILS NFR BLD AUTO: 0.5 %
EOSINOPHIL # BLD AUTO: 0.2 X10(3) UL (ref 0–0.7)
EOSINOPHIL NFR BLD AUTO: 3.1 %
ERYTHROCYTE [DISTWIDTH] IN BLOOD BY AUTOMATED COUNT: 14.6 %
HCT VFR BLD AUTO: 36.8 %
HGB BLD-MCNC: 11.7 G/DL
IMM GRANULOCYTES # BLD AUTO: 0.03 X10(3) UL (ref 0–1)
IMM GRANULOCYTES NFR BLD: 0.5 %
LYMPHOCYTES # BLD AUTO: 1.46 X10(3) UL (ref 1–4)
LYMPHOCYTES NFR BLD AUTO: 22.8 %
MCH RBC QN AUTO: 26.1 PG (ref 26–34)
MCHC RBC AUTO-ENTMCNC: 31.8 G/DL (ref 31–37)
MCV RBC AUTO: 82 FL
MONOCYTES # BLD AUTO: 0.42 X10(3) UL (ref 0.1–1)
MONOCYTES NFR BLD AUTO: 6.6 %
NEUTROPHILS # BLD AUTO: 4.25 X10 (3) UL (ref 1.5–7.7)
NEUTROPHILS # BLD AUTO: 4.25 X10(3) UL (ref 1.5–7.7)
NEUTROPHILS NFR BLD AUTO: 66.5 %
PLATELET # BLD AUTO: 236 10(3)UL (ref 150–450)
RBC # BLD AUTO: 4.49 X10(6)UL
WBC # BLD AUTO: 6.4 X10(3) UL (ref 4–11)

## 2023-03-07 PROCEDURE — 36415 COLL VENOUS BLD VENIPUNCTURE: CPT

## 2023-03-07 PROCEDURE — 85025 COMPLETE CBC W/AUTO DIFF WBC: CPT

## 2023-03-09 ENCOUNTER — HOSPITAL ENCOUNTER (OUTPATIENT)
Dept: INTERVENTIONAL RADIOLOGY/VASCULAR | Facility: HOSPITAL | Age: 78
Discharge: HOME | End: 2023-03-09
Attending: INTERNAL MEDICINE | Admitting: INTERNAL MEDICINE
Payer: COMMERCIAL

## 2023-03-09 VITALS
RESPIRATION RATE: 17 BRPM | HEART RATE: 63 BPM | DIASTOLIC BLOOD PRESSURE: 56 MMHG | OXYGEN SATURATION: 98 % | SYSTOLIC BLOOD PRESSURE: 111 MMHG | TEMPERATURE: 98 F

## 2023-03-09 DIAGNOSIS — C50.412 MALIGNANT NEOPLASM OF UPPER-OUTER QUADRANT OF LEFT BREAST IN FEMALE, ESTROGEN RECEPTOR NEGATIVE (HCC): Primary | ICD-10-CM

## 2023-03-09 DIAGNOSIS — Z17.1 MALIGNANT NEOPLASM OF OVERLAPPING SITES OF LEFT BREAST IN FEMALE, ESTROGEN RECEPTOR NEGATIVE (HCC): ICD-10-CM

## 2023-03-09 DIAGNOSIS — C50.812 MALIGNANT NEOPLASM OF OVERLAPPING SITES OF LEFT BREAST IN FEMALE, ESTROGEN RECEPTOR NEGATIVE (HCC): ICD-10-CM

## 2023-03-09 DIAGNOSIS — Z17.1 MALIGNANT NEOPLASM OF UPPER-OUTER QUADRANT OF LEFT BREAST IN FEMALE, ESTROGEN RECEPTOR NEGATIVE (HCC): Primary | ICD-10-CM

## 2023-03-09 LAB — INR: 0.9 (ref 0.8–1.3)

## 2023-03-09 PROCEDURE — 36561 INSERT TUNNELED CV CATH: CPT | Performed by: RADIOLOGY

## 2023-03-09 PROCEDURE — 85610 PROTHROMBIN TIME: CPT | Performed by: RADIOLOGY

## 2023-03-09 PROCEDURE — B518ZZA FLUOROSCOPY OF SUPERIOR VENA CAVA, GUIDANCE: ICD-10-PCS | Performed by: RADIOLOGY

## 2023-03-09 PROCEDURE — 76937 US GUIDE VASCULAR ACCESS: CPT | Performed by: RADIOLOGY

## 2023-03-09 PROCEDURE — B543ZZA ULTRASONOGRAPHY OF RIGHT JUGULAR VEINS, GUIDANCE: ICD-10-PCS | Performed by: RADIOLOGY

## 2023-03-09 PROCEDURE — 77001 FLUOROGUIDE FOR VEIN DEVICE: CPT | Performed by: RADIOLOGY

## 2023-03-09 PROCEDURE — 02HV33Z INSERTION OF INFUSION DEVICE INTO SUPERIOR VENA CAVA, PERCUTANEOUS APPROACH: ICD-10-PCS | Performed by: RADIOLOGY

## 2023-03-09 PROCEDURE — 0JH60WZ INSERTION OF TOTALLY IMPLANTABLE VASCULAR ACCESS DEVICE INTO CHEST SUBCUTANEOUS TISSUE AND FASCIA, OPEN APPROACH: ICD-10-PCS | Performed by: RADIOLOGY

## 2023-03-09 PROCEDURE — 99152 MOD SED SAME PHYS/QHP 5/>YRS: CPT | Performed by: RADIOLOGY

## 2023-03-09 RX ORDER — MIDAZOLAM HYDROCHLORIDE 1 MG/ML
INJECTION INTRAMUSCULAR; INTRAVENOUS
Status: COMPLETED
Start: 2023-03-09 | End: 2023-03-09

## 2023-03-09 RX ORDER — LIDOCAINE HYDROCHLORIDE 10 MG/ML
INJECTION, SOLUTION INFILTRATION; PERINEURAL
Status: COMPLETED
Start: 2023-03-09 | End: 2023-03-09

## 2023-03-09 RX ORDER — LIDOCAINE HYDROCHLORIDE AND EPINEPHRINE BITARTRATE 20; .01 MG/ML; MG/ML
INJECTION, SOLUTION SUBCUTANEOUS
Status: COMPLETED
Start: 2023-03-09 | End: 2023-03-09

## 2023-03-09 RX ORDER — SODIUM CHLORIDE 9 MG/ML
INJECTION, SOLUTION INTRAVENOUS CONTINUOUS
Status: DISCONTINUED | OUTPATIENT
Start: 2023-03-09 | End: 2023-03-09

## 2023-03-09 RX ORDER — HEPARIN SODIUM 5000 [USP'U]/ML
INJECTION, SOLUTION INTRAVENOUS; SUBCUTANEOUS
Status: COMPLETED
Start: 2023-03-09 | End: 2023-03-09

## 2023-03-09 RX ORDER — CEFAZOLIN SODIUM 1 G/3ML
INJECTION, POWDER, FOR SOLUTION INTRAMUSCULAR; INTRAVENOUS
Status: COMPLETED
Start: 2023-03-09 | End: 2023-03-09

## 2023-03-09 RX ADMIN — SODIUM CHLORIDE: 9 INJECTION, SOLUTION INTRAVENOUS at 09:15:00

## 2023-03-09 NOTE — PROGRESS NOTES
Pt s/p PAC placement. Dressings to R upper chest remained CDI throughout recovery period. VSS. Pt denied pain. Tolerated PO intake and ambulation in room. IV d/c'd. Discharge instructions given-including port card-pt verbalized understanding. Pt to lobby via [facility] and  to drive home.

## 2023-03-09 NOTE — DISCHARGE INSTRUCTIONS
Do not drive for 24 hours. Do not drink alcohol for the next 24 hours. Keep the \"pink\" dressing clean and dry for 5 days. Do not shower until this \"pink\" dressing is removed. The chemo nurse will remove this dressing if you have chemo before 5 days. If you have not had a chemo session in 5 days, remove the \"pink\" dressing. After the \"pink\" dressing is removed you may shower. Do not submerge the port site until it is completely healed. The small white dressing on your neck can be removed tomorrow. The small white strips (steri strips) under the \"pink\" dressing are to remain in place. DO NOT remove these strips. They will fall off on their own in a week or two. If you have a fever >100.4 degrees, chills, signs of infection including redness, swelling, thick yellow drainage and/or a foul smell coming from the port site call your oncologist right away.

## 2023-03-09 NOTE — PROCEDURES
[facility]  Procedure Note    Perfecto Kunz Patient Status:  Outpatient    1945 MRN [id number]   Location [address] [facility] Attending Jacqueline Islas MD   Hosp Day # 0 PCP Brook Hernandez MD     Procedure: [facility] placement    Pre-Procedure Diagnosis:  Left breast cancer    Post-Procedure Diagnosis: Same    Anesthesia:  Sedation    Findings:  Patent right internal jugular vein    Specimens: None    Blood Loss:  < 5 cc    Tourniquet Time: None  Complications:  None  Drains:  None    Secondary Diagnosis:  n/A    Juice Garcia MD  3/9/2023

## 2023-03-10 ENCOUNTER — OFFICE VISIT (OUTPATIENT)
Dept: HEMATOLOGY/ONCOLOGY | Facility: HOSPITAL | Age: 78
End: 2023-03-10
Attending: SURGERY
Payer: COMMERCIAL

## 2023-03-10 DIAGNOSIS — Z17.1 MALIGNANT NEOPLASM OF OVERLAPPING SITES OF LEFT BREAST IN FEMALE, ESTROGEN RECEPTOR NEGATIVE (HCC): Primary | ICD-10-CM

## 2023-03-10 DIAGNOSIS — C50.812 MALIGNANT NEOPLASM OF OVERLAPPING SITES OF LEFT BREAST IN FEMALE, ESTROGEN RECEPTOR NEGATIVE (HCC): Primary | ICD-10-CM

## 2023-03-10 DIAGNOSIS — Z71.9 ENCOUNTER FOR HEALTH EDUCATION: ICD-10-CM

## 2023-03-10 DIAGNOSIS — C50.412 MALIGNANT NEOPLASM OF UPPER-OUTER QUADRANT OF LEFT BREAST IN FEMALE, ESTROGEN RECEPTOR NEGATIVE (HCC): ICD-10-CM

## 2023-03-10 DIAGNOSIS — Z17.1 MALIGNANT NEOPLASM OF UPPER-OUTER QUADRANT OF LEFT BREAST IN FEMALE, ESTROGEN RECEPTOR NEGATIVE (HCC): ICD-10-CM

## 2023-03-10 RX ORDER — PROCHLORPERAZINE MALEATE 10 MG
10 TABLET ORAL EVERY 6 HOURS PRN
Qty: 30 TABLET | Refills: 3 | Status: SHIPPED | OUTPATIENT
Start: 2023-03-10

## 2023-03-10 RX ORDER — ONDANSETRON HYDROCHLORIDE 8 MG/1
8 TABLET, FILM COATED ORAL EVERY 8 HOURS PRN
Qty: 30 TABLET | Refills: 3 | Status: SHIPPED | OUTPATIENT
Start: 2023-03-10

## 2023-03-13 ENCOUNTER — HOSPITAL ENCOUNTER (OUTPATIENT)
Dept: MRI IMAGING | Facility: HOSPITAL | Age: 78
Discharge: HOME | End: 2023-03-13
Attending: SURGERY
Payer: COMMERCIAL

## 2023-03-13 DIAGNOSIS — C50.412 MALIGNANT NEOPLASM OF UPPER-OUTER QUADRANT OF LEFT BREAST IN FEMALE, ESTROGEN RECEPTOR NEGATIVE (HCC): ICD-10-CM

## 2023-03-13 DIAGNOSIS — Z17.1 MALIGNANT NEOPLASM OF UPPER-OUTER QUADRANT OF LEFT BREAST IN FEMALE, ESTROGEN RECEPTOR NEGATIVE (HCC): ICD-10-CM

## 2023-03-13 PROCEDURE — A9575 INJ GADOTERATE MEGLUMI 0.1ML: HCPCS | Performed by: SURGERY

## 2023-03-13 PROCEDURE — 77049 MRI BREAST C-+ W/CAD BI: CPT | Performed by: SURGERY

## 2023-03-13 RX ORDER — GADOTERATE MEGLUMINE 376.9 MG/ML
20 INJECTION INTRAVENOUS
Status: COMPLETED | OUTPATIENT
Start: 2023-03-13 | End: 2023-03-13

## 2023-03-13 RX ADMIN — GADOTERATE MEGLUMINE 17 ML: 376.9 INJECTION INTRAVENOUS at 12:18:00

## 2023-03-14 ENCOUNTER — OFFICE VISIT (OUTPATIENT)
Dept: HEMATOLOGY/ONCOLOGY | Facility: HOSPITAL | Age: 78
End: 2023-03-14
Attending: SURGERY
Payer: COMMERCIAL

## 2023-03-14 ENCOUNTER — TELEPHONE (OUTPATIENT)
Dept: MAMMOGRAPHY | Facility: HOSPITAL | Age: 78
End: 2023-03-14

## 2023-03-14 DIAGNOSIS — Z17.1 MALIGNANT NEOPLASM OF UPPER-OUTER QUADRANT OF LEFT BREAST IN FEMALE, ESTROGEN RECEPTOR NEGATIVE (HCC): Primary | ICD-10-CM

## 2023-03-14 DIAGNOSIS — C50.412 MALIGNANT NEOPLASM OF UPPER-OUTER QUADRANT OF LEFT BREAST IN FEMALE, ESTROGEN RECEPTOR NEGATIVE (HCC): Primary | ICD-10-CM

## 2023-03-14 NOTE — TELEPHONE ENCOUNTER
This Breast Care RN phoned pt re left breast 1 site stereotactic biopsy recommendation by Dr. Sujatha Ramires for calcifications (see breast MRI report)   Procedure reviewed and all questions answered. Emotional support given. Reviewed educational handouts with patient and her . On the day of the biopsy, pt instructed to take Tylenol 1000mg PO, eat a light meal & bring or wear a sports bra. Post biopsy care also reviewed with pt to include NO lifting more than 5lbs, no exercising or housework (limit upper body movement) for 24-48 hrs post biopsy. Patient denies blood thinners, bleeding disorders, liver disease, and chemo. Pt verbalized understanding. Our breast center schedulers will be calling to schedule an appt that is convenient for pt.

## 2023-03-15 ENCOUNTER — HOSPITAL ENCOUNTER (OUTPATIENT)
Dept: MAMMOGRAPHY | Facility: HOSPITAL | Age: 78
Discharge: HOME | End: 2023-03-15
Attending: INTERNAL MEDICINE
Payer: COMMERCIAL

## 2023-03-15 DIAGNOSIS — Z17.1 MALIGNANT NEOPLASM OF UPPER-OUTER QUADRANT OF LEFT BREAST IN FEMALE, ESTROGEN RECEPTOR NEGATIVE (HCC): ICD-10-CM

## 2023-03-15 DIAGNOSIS — C50.412 MALIGNANT NEOPLASM OF UPPER-OUTER QUADRANT OF LEFT BREAST IN FEMALE, ESTROGEN RECEPTOR NEGATIVE (HCC): ICD-10-CM

## 2023-03-15 PROCEDURE — 19081 BX BREAST 1ST LESION STRTCTC: CPT | Performed by: INTERNAL MEDICINE

## 2023-03-15 PROCEDURE — 88305 TISSUE EXAM BY PATHOLOGIST: CPT | Performed by: INTERNAL MEDICINE

## 2023-03-15 PROCEDURE — 88360 TUMOR IMMUNOHISTOCHEM/MANUAL: CPT | Performed by: INTERNAL MEDICINE

## 2023-03-16 ENCOUNTER — OFFICE VISIT (OUTPATIENT)
Dept: HEMATOLOGY/ONCOLOGY | Facility: HOSPITAL | Age: 78
End: 2023-03-16
Attending: SURGERY
Payer: COMMERCIAL

## 2023-03-16 ENCOUNTER — SOCIAL WORK SERVICES (OUTPATIENT)
Dept: HEMATOLOGY/ONCOLOGY | Facility: HOSPITAL | Age: 78
End: 2023-03-16

## 2023-03-16 VITALS
DIASTOLIC BLOOD PRESSURE: 71 MMHG | WEIGHT: 174.38 LBS | RESPIRATION RATE: 18 BRPM | HEIGHT: 63.19 IN | BODY MASS INDEX: 30.52 KG/M2 | HEART RATE: 73 BPM | TEMPERATURE: 98 F | OXYGEN SATURATION: 98 % | SYSTOLIC BLOOD PRESSURE: 175 MMHG

## 2023-03-16 DIAGNOSIS — Z17.1 MALIGNANT NEOPLASM OF UPPER-OUTER QUADRANT OF LEFT BREAST IN FEMALE, ESTROGEN RECEPTOR NEGATIVE (HCC): Primary | ICD-10-CM

## 2023-03-16 DIAGNOSIS — C50.412 MALIGNANT NEOPLASM OF UPPER-OUTER QUADRANT OF LEFT BREAST IN FEMALE, ESTROGEN RECEPTOR NEGATIVE (HCC): Primary | ICD-10-CM

## 2023-03-16 LAB
ALBUMIN SERPL-MCNC: 3.2 G/DL (ref 3.4–5)
ALBUMIN/GLOB SERPL: 0.8 {RATIO} (ref 1–2)
ALP LIVER SERPL-CCNC: 92 U/L
ALT SERPL-CCNC: 25 U/L
ANION GAP SERPL CALC-SCNC: 4 MMOL/L (ref 0–18)
AST SERPL-CCNC: 22 U/L (ref 15–37)
BASOPHILS # BLD AUTO: 0.02 X10(3) UL (ref 0–0.2)
BASOPHILS NFR BLD AUTO: 0.3 %
BILIRUB SERPL-MCNC: 0.4 MG/DL (ref 0.1–2)
BUN BLD-MCNC: 20 MG/DL (ref 7–18)
CALCIUM BLD-MCNC: 9.3 MG/DL (ref 8.5–10.1)
CHLORIDE SERPL-SCNC: 104 MMOL/L (ref 98–112)
CO2 SERPL-SCNC: 30 MMOL/L (ref 21–32)
CREAT BLD-MCNC: 0.72 MG/DL
EOSINOPHIL # BLD AUTO: 0.23 X10(3) UL (ref 0–0.7)
EOSINOPHIL NFR BLD AUTO: 3.2 %
ERYTHROCYTE [DISTWIDTH] IN BLOOD BY AUTOMATED COUNT: 14.6 %
GFR SERPLBLD BASED ON 1.73 SQ M-ARVRAT: 86 ML/MIN/1.73M2 (ref 60–?)
GLOBULIN PLAS-MCNC: 4 G/DL (ref 2.8–4.4)
GLUCOSE BLD-MCNC: 106 MG/DL (ref 70–99)
HCT VFR BLD AUTO: 36.1 %
HGB BLD-MCNC: 11.5 G/DL
IMM GRANULOCYTES # BLD AUTO: 0.03 X10(3) UL (ref 0–1)
IMM GRANULOCYTES NFR BLD: 0.4 %
LYMPHOCYTES # BLD AUTO: 1.32 X10(3) UL (ref 1–4)
LYMPHOCYTES NFR BLD AUTO: 18.4 %
MCH RBC QN AUTO: 26 PG (ref 26–34)
MCHC RBC AUTO-ENTMCNC: 31.9 G/DL (ref 31–37)
MCV RBC AUTO: 81.5 FL
MONOCYTES # BLD AUTO: 0.53 X10(3) UL (ref 0.1–1)
MONOCYTES NFR BLD AUTO: 7.4 %
NEUTROPHILS # BLD AUTO: 5.03 X10 (3) UL (ref 1.5–7.7)
NEUTROPHILS # BLD AUTO: 5.03 X10(3) UL (ref 1.5–7.7)
NEUTROPHILS NFR BLD AUTO: 70.3 %
OSMOLALITY SERPL CALC.SUM OF ELEC: 289 MOSM/KG (ref 275–295)
PLATELET # BLD AUTO: 235 10(3)UL (ref 150–450)
POTASSIUM SERPL-SCNC: 3.7 MMOL/L (ref 3.5–5.1)
PROT SERPL-MCNC: 7.2 G/DL (ref 6.4–8.2)
RBC # BLD AUTO: 4.43 X10(6)UL
SODIUM SERPL-SCNC: 138 MMOL/L (ref 136–145)
WBC # BLD AUTO: 7.2 X10(3) UL (ref 4–11)

## 2023-03-16 PROCEDURE — 96417 CHEMO IV INFUS EACH ADDL SEQ: CPT

## 2023-03-16 PROCEDURE — 85025 COMPLETE CBC W/AUTO DIFF WBC: CPT

## 2023-03-16 PROCEDURE — S0028 INJECTION, FAMOTIDINE, 20 MG: HCPCS | Performed by: INTERNAL MEDICINE

## 2023-03-16 PROCEDURE — 99215 OFFICE O/P EST HI 40 MIN: CPT | Performed by: INTERNAL MEDICINE

## 2023-03-16 PROCEDURE — 96413 CHEMO IV INFUSION 1 HR: CPT

## 2023-03-16 PROCEDURE — 96375 TX/PRO/DX INJ NEW DRUG ADDON: CPT

## 2023-03-16 PROCEDURE — 80053 COMPREHEN METABOLIC PANEL: CPT

## 2023-03-16 RX ORDER — ACETAMINOPHEN 325 MG/1
650 TABLET ORAL ONCE
Status: CANCELLED | OUTPATIENT
Start: 2023-03-16

## 2023-03-16 RX ORDER — ACETAMINOPHEN 325 MG/1
650 TABLET ORAL ONCE
Status: COMPLETED | OUTPATIENT
Start: 2023-03-16 | End: 2023-03-16

## 2023-03-16 RX ORDER — FAMOTIDINE 10 MG/ML
20 INJECTION, SOLUTION INTRAVENOUS ONCE
Status: CANCELLED | OUTPATIENT
Start: 2023-03-16

## 2023-03-16 RX ORDER — FAMOTIDINE 10 MG/ML
20 INJECTION, SOLUTION INTRAVENOUS ONCE
Status: COMPLETED | OUTPATIENT
Start: 2023-03-16 | End: 2023-03-16

## 2023-03-16 RX ORDER — ATORVASTATIN CALCIUM 20 MG/1
20 TABLET, FILM COATED ORAL NIGHTLY
COMMUNITY

## 2023-03-16 RX ORDER — DIPHENHYDRAMINE HYDROCHLORIDE 50 MG/ML
25 INJECTION INTRAMUSCULAR; INTRAVENOUS ONCE
Status: COMPLETED | OUTPATIENT
Start: 2023-03-16 | End: 2023-03-16

## 2023-03-16 RX ORDER — DIPHENHYDRAMINE HYDROCHLORIDE 50 MG/ML
25 INJECTION INTRAMUSCULAR; INTRAVENOUS ONCE
Status: CANCELLED | OUTPATIENT
Start: 2023-03-16

## 2023-03-16 RX ORDER — LORAZEPAM 2 MG/ML
0.5 INJECTION INTRAMUSCULAR AS NEEDED
Status: CANCELLED
Start: 2023-03-16

## 2023-03-16 RX ADMIN — DIPHENHYDRAMINE HYDROCHLORIDE 25 MG: 50 INJECTION INTRAMUSCULAR; INTRAVENOUS at 10:23:00

## 2023-03-16 RX ADMIN — FAMOTIDINE 20 MG: 10 INJECTION, SOLUTION INTRAVENOUS at 10:23:00

## 2023-03-16 RX ADMIN — ACETAMINOPHEN 650 MG: 325 TABLET ORAL at 10:24:00

## 2023-03-16 NOTE — PROGRESS NOTES
Pt here for C1D1 Taxol/Trazimera. Arrives Ambulating independently, accompanied by Self and Spouse           Pregnancy screening: Denies possibility of pregnancy    Modifications in dose or schedule: No    Drugs/infusions dual verified for appearance and physical integrity. IV pump settings were dual verified: yes     Frequency of blood return and site check throughout administration: Prior to administration   Discharged to Home, Ambulating independently, accompanied by:Self and Spouse    Outpatient Oncology Care Plan  Problem list:  No complaints  Problems related to: No complaints  Interventions:  promoted rest  Expected outcomes:  understands plan of care  Progress towards outcome:  making progress    Education Record    Learner:  Patient  Barriers / Limitations:  None  Method:  Discussion  Outcome:  Shows understanding  Comments: Patient tolerated infusion without incident. Labs drawn from new port without incident, and it is working appropriately. Patient was nervous for infusion today, PRN ativan was ordered but not given today. She was nervous about how to take her nausea regimen at home, advised patient on schedule and gave a printout of times. Appt requested for 6 days from now, as MD wants to eventually get her onto a Tuesday regimen schedule. Patient left in stable condition, verbalized understanding of post infusion needs.

## 2023-03-16 NOTE — PATIENT INSTRUCTIONS
Anti-Nausea Medication:    Ondansetron (Zofran) -- can take every 8 hours as needed for nausea. Prochlorperazine (Compazine) -- can take every 6-8 hours as needed for nausea. To Alternate: Can take Compazine 4 hours after IV Zofran today. Then 4 hours later can take oral Zofran again, 4 hours later compazine, etc.     IV Zofran today @___10:25am________    Oral Compazine @___2:25pm_______    Oral Zofran @___6:25pm_____    You do not need to wake yourself up to take anything. Start up again in the morning. Alternate every 4 hours like this for next 2-3 days.

## 2023-03-16 NOTE — PROGRESS NOTES
met with patient and  Jonelle Zafar in treatment room for introduction and role explanation. Patient states that she is feeling a little anxious for treatment, which she knows is normal. She reports that she is most concerned on how treatment will make her feel, along with the possibility of reaction.  provided support and comfort, assuring patient that she is in good hands with very responsive RNs. Patient and  have 4 Sons, Gabino and Angélica Venegas, who are both  and live in [facility]. They have 5 grandchildren total. Patient states that they are happy their children live nearby, and they are calling and coming to the house regularly, so she has a lot of support. Patient states that both sons are working, but are higher up in the [employer] in Management; educated on FMLA if needed.  assisted in completing Power of  for Healthcare where patient named  Jonelle Zafar as Primary Agent; copies made and handed to family and given to Maxx Barrera to upload into their chart. Educated on available resources, providing Social Work Support Packet including [address] [facility], [place], Transportation, and [address] contacts. Educated on BHI if desired. Contact provided and family is aware to reach out with any needs. Bringing Coty Bag given, which patient was grateful for.

## 2023-03-17 ENCOUNTER — TELEPHONE (OUTPATIENT)
Dept: HEMATOLOGY/ONCOLOGY | Facility: HOSPITAL | Age: 78
End: 2023-03-17

## 2023-03-17 NOTE — TELEPHONE ENCOUNTER
Toxicities: C1 D1 Paclitaxel/Trastuzumab-qyyp on 3/16/2023    David reports Shamim is feeling \"good. \" She was a little tired when they got home yesterday. She slept well and has good energy today. No side effects at this time. I encouraged him to please call the office if Shamim is not feeling well or they have any questions or concerns. He agreed and thanked me for checking on her.

## 2023-03-22 ENCOUNTER — OFFICE VISIT (OUTPATIENT)
Dept: HEMATOLOGY/ONCOLOGY | Facility: HOSPITAL | Age: 78
End: 2023-03-22
Attending: SURGERY
Payer: COMMERCIAL

## 2023-03-22 VITALS
SYSTOLIC BLOOD PRESSURE: 157 MMHG | WEIGHT: 175.63 LBS | RESPIRATION RATE: 18 BRPM | HEART RATE: 77 BPM | OXYGEN SATURATION: 98 % | TEMPERATURE: 98 F | BODY MASS INDEX: 30.73 KG/M2 | DIASTOLIC BLOOD PRESSURE: 85 MMHG | HEIGHT: 63.19 IN

## 2023-03-22 DIAGNOSIS — Z17.1 MALIGNANT NEOPLASM OF UPPER-OUTER QUADRANT OF LEFT BREAST IN FEMALE, ESTROGEN RECEPTOR NEGATIVE (HCC): Primary | ICD-10-CM

## 2023-03-22 DIAGNOSIS — C50.412 MALIGNANT NEOPLASM OF UPPER-OUTER QUADRANT OF LEFT BREAST IN FEMALE, ESTROGEN RECEPTOR NEGATIVE (HCC): Primary | ICD-10-CM

## 2023-03-22 DIAGNOSIS — T45.1X5A ADVERSE EFFECT OF CHEMOTHERAPY, INITIAL ENCOUNTER: ICD-10-CM

## 2023-03-22 LAB
ALBUMIN SERPL-MCNC: 3.2 G/DL (ref 3.4–5)
ALBUMIN/GLOB SERPL: 0.8 {RATIO} (ref 1–2)
ALP LIVER SERPL-CCNC: 86 U/L
ALT SERPL-CCNC: 61 U/L
ANION GAP SERPL CALC-SCNC: 5 MMOL/L (ref 0–18)
AST SERPL-CCNC: 32 U/L (ref 15–37)
BASOPHILS # BLD AUTO: 0.02 X10(3) UL (ref 0–0.2)
BASOPHILS NFR BLD AUTO: 0.3 %
BILIRUB SERPL-MCNC: 0.5 MG/DL (ref 0.1–2)
BUN BLD-MCNC: 20 MG/DL (ref 7–18)
CALCIUM BLD-MCNC: 9.5 MG/DL (ref 8.5–10.1)
CHLORIDE SERPL-SCNC: 104 MMOL/L (ref 98–112)
CO2 SERPL-SCNC: 29 MMOL/L (ref 21–32)
CREAT BLD-MCNC: 0.83 MG/DL
EOSINOPHIL # BLD AUTO: 0.18 X10(3) UL (ref 0–0.7)
EOSINOPHIL NFR BLD AUTO: 3.1 %
ERYTHROCYTE [DISTWIDTH] IN BLOOD BY AUTOMATED COUNT: 14.6 %
GFR SERPLBLD BASED ON 1.73 SQ M-ARVRAT: 72 ML/MIN/1.73M2 (ref 60–?)
GLOBULIN PLAS-MCNC: 3.8 G/DL (ref 2.8–4.4)
GLUCOSE BLD-MCNC: 106 MG/DL (ref 70–99)
HCT VFR BLD AUTO: 35.3 %
HGB BLD-MCNC: 11.5 G/DL
IMM GRANULOCYTES # BLD AUTO: 0.04 X10(3) UL (ref 0–1)
IMM GRANULOCYTES NFR BLD: 0.7 %
LYMPHOCYTES # BLD AUTO: 1.13 X10(3) UL (ref 1–4)
LYMPHOCYTES NFR BLD AUTO: 19.8 %
MCH RBC QN AUTO: 26.7 PG (ref 26–34)
MCHC RBC AUTO-ENTMCNC: 32.6 G/DL (ref 31–37)
MCV RBC AUTO: 81.9 FL
MONOCYTES # BLD AUTO: 0.19 X10(3) UL (ref 0.1–1)
MONOCYTES NFR BLD AUTO: 3.3 %
NEUTROPHILS # BLD AUTO: 4.16 X10 (3) UL (ref 1.5–7.7)
NEUTROPHILS # BLD AUTO: 4.16 X10(3) UL (ref 1.5–7.7)
NEUTROPHILS NFR BLD AUTO: 72.8 %
OSMOLALITY SERPL CALC.SUM OF ELEC: 289 MOSM/KG (ref 275–295)
PLATELET # BLD AUTO: 218 10(3)UL (ref 150–450)
POTASSIUM SERPL-SCNC: 3.7 MMOL/L (ref 3.5–5.1)
PROT SERPL-MCNC: 7 G/DL (ref 6.4–8.2)
RBC # BLD AUTO: 4.31 X10(6)UL
SODIUM SERPL-SCNC: 138 MMOL/L (ref 136–145)
WBC # BLD AUTO: 5.7 X10(3) UL (ref 4–11)

## 2023-03-22 PROCEDURE — 96375 TX/PRO/DX INJ NEW DRUG ADDON: CPT

## 2023-03-22 PROCEDURE — 80053 COMPREHEN METABOLIC PANEL: CPT

## 2023-03-22 PROCEDURE — 96413 CHEMO IV INFUSION 1 HR: CPT

## 2023-03-22 PROCEDURE — 85025 COMPLETE CBC W/AUTO DIFF WBC: CPT

## 2023-03-22 PROCEDURE — 99215 OFFICE O/P EST HI 40 MIN: CPT | Performed by: INTERNAL MEDICINE

## 2023-03-22 PROCEDURE — 96417 CHEMO IV INFUS EACH ADDL SEQ: CPT

## 2023-03-22 PROCEDURE — S0028 INJECTION, FAMOTIDINE, 20 MG: HCPCS | Performed by: INTERNAL MEDICINE

## 2023-03-22 RX ORDER — DIPHENHYDRAMINE HYDROCHLORIDE 50 MG/ML
25 INJECTION INTRAMUSCULAR; INTRAVENOUS ONCE
Status: CANCELLED | OUTPATIENT
Start: 2023-03-22

## 2023-03-22 RX ORDER — FAMOTIDINE 10 MG/ML
20 INJECTION, SOLUTION INTRAVENOUS ONCE
Status: COMPLETED | OUTPATIENT
Start: 2023-03-22 | End: 2023-03-22

## 2023-03-22 RX ORDER — DIPHENHYDRAMINE HYDROCHLORIDE 50 MG/ML
25 INJECTION INTRAMUSCULAR; INTRAVENOUS ONCE
Status: COMPLETED | OUTPATIENT
Start: 2023-03-22 | End: 2023-03-22

## 2023-03-22 RX ORDER — FAMOTIDINE 10 MG/ML
20 INJECTION, SOLUTION INTRAVENOUS ONCE
Status: CANCELLED | OUTPATIENT
Start: 2023-03-22

## 2023-03-22 RX ADMIN — DIPHENHYDRAMINE HYDROCHLORIDE 25 MG: 50 INJECTION INTRAMUSCULAR; INTRAVENOUS at 10:24:00

## 2023-03-22 RX ADMIN — FAMOTIDINE 20 MG: 10 INJECTION, SOLUTION INTRAVENOUS at 10:26:00

## 2023-03-22 NOTE — PROGRESS NOTES
Education Record    Learner:  Patient/ spouse    Disease / Diagnosis: breast ca  OTV D8C1 Taxol/ Trastuzumab    Barriers / Limitations:  None   Comments:    Method:  Discussion   Comments:    General Topics:  Plan of care reviewed   Comments:    Outcome:  Shows understanding   Comments:    Some fatigue day after treatment. Feeling well today. Some constipation with taking zofran. Appetite is good,   Drinking increased fluids, no swelling. Emotional support offered. Pt has anthony son information.

## 2023-03-22 NOTE — PROGRESS NOTES
Pt here for C1D8 Trazimera/Taxol. Arrives Ambulating independently, accompanied by Spouse           Pregnancy screening: Denies possibility of pregnancy    Modifications in dose or schedule: Yes - per Dr. Walter Bran, Dex dose decreased to 10mg. Drugs/infusions dual verified for appearance and physical integrity. IV pump settings were dual verified: yes     Frequency of blood return and site check throughout administration: Prior to administration and At completion of therapy   Discharged to Home, Ambulating independently, accompanied by:Spouse    Outpatient Oncology Care Plan  Problem list:  constipation  fatigue  nausea and vomiting  Problems related to:  chemotherapy  disease/disease progression  side effect of treatment  Interventions:  maintain safe environment  encourage activity as tolerated  promoted rest  provided general teaching  Expected outcomes:  adequate sleep/rest  safe in environment  symptoms relieved/minimized  understands plan of care  Progress towards outcome:  making progress    Education Record    Learner:  Patient  Barriers / Limitations:  None  Method:  Brief focused and Reinforcement  Outcome:  Shows understanding  Comments: Patient tolerated chemotherapy and discharged in stable condition.

## 2023-03-28 ENCOUNTER — APPOINTMENT (OUTPATIENT)
Dept: HEMATOLOGY/ONCOLOGY | Facility: HOSPITAL | Age: 78
End: 2023-03-28
Attending: SURGERY
Payer: COMMERCIAL

## 2023-03-29 ENCOUNTER — OFFICE VISIT (OUTPATIENT)
Dept: HEMATOLOGY/ONCOLOGY | Facility: HOSPITAL | Age: 78
End: 2023-03-29
Attending: SURGERY
Payer: COMMERCIAL

## 2023-03-29 VITALS
HEART RATE: 72 BPM | OXYGEN SATURATION: 97 % | RESPIRATION RATE: 18 BRPM | WEIGHT: 175 LBS | BODY MASS INDEX: 30.62 KG/M2 | DIASTOLIC BLOOD PRESSURE: 79 MMHG | SYSTOLIC BLOOD PRESSURE: 133 MMHG | HEIGHT: 63.19 IN | TEMPERATURE: 97 F

## 2023-03-29 DIAGNOSIS — R11.0 CHEMOTHERAPY-INDUCED NAUSEA: ICD-10-CM

## 2023-03-29 DIAGNOSIS — Z17.1 MALIGNANT NEOPLASM OF UPPER-OUTER QUADRANT OF LEFT BREAST IN FEMALE, ESTROGEN RECEPTOR NEGATIVE (HCC): Primary | ICD-10-CM

## 2023-03-29 DIAGNOSIS — C50.412 MALIGNANT NEOPLASM OF UPPER-OUTER QUADRANT OF LEFT BREAST IN FEMALE, ESTROGEN RECEPTOR NEGATIVE (HCC): Primary | ICD-10-CM

## 2023-03-29 DIAGNOSIS — T45.1X5A CHEMOTHERAPY-INDUCED NAUSEA: ICD-10-CM

## 2023-03-29 LAB
ALBUMIN SERPL-MCNC: 3.2 G/DL (ref 3.4–5)
ALBUMIN/GLOB SERPL: 0.9 {RATIO} (ref 1–2)
ALP LIVER SERPL-CCNC: 78 U/L
ALT SERPL-CCNC: 46 U/L
ANION GAP SERPL CALC-SCNC: 3 MMOL/L (ref 0–18)
AST SERPL-CCNC: 24 U/L (ref 15–37)
BASOPHILS # BLD AUTO: 0.02 X10(3) UL (ref 0–0.2)
BASOPHILS NFR BLD AUTO: 0.5 %
BILIRUB SERPL-MCNC: 0.4 MG/DL (ref 0.1–2)
BUN BLD-MCNC: 27 MG/DL (ref 7–18)
CALCIUM BLD-MCNC: 9.1 MG/DL (ref 8.5–10.1)
CHLORIDE SERPL-SCNC: 106 MMOL/L (ref 98–112)
CO2 SERPL-SCNC: 30 MMOL/L (ref 21–32)
CREAT BLD-MCNC: 0.83 MG/DL
EOSINOPHIL # BLD AUTO: 0.21 X10(3) UL (ref 0–0.7)
EOSINOPHIL NFR BLD AUTO: 4.9 %
ERYTHROCYTE [DISTWIDTH] IN BLOOD BY AUTOMATED COUNT: 14.9 %
GFR SERPLBLD BASED ON 1.73 SQ M-ARVRAT: 72 ML/MIN/1.73M2 (ref 60–?)
GLOBULIN PLAS-MCNC: 3.7 G/DL (ref 2.8–4.4)
GLUCOSE BLD-MCNC: 113 MG/DL (ref 70–99)
HCT VFR BLD AUTO: 33.8 %
HGB BLD-MCNC: 10.7 G/DL
IMM GRANULOCYTES # BLD AUTO: 0.03 X10(3) UL (ref 0–1)
IMM GRANULOCYTES NFR BLD: 0.7 %
LYMPHOCYTES # BLD AUTO: 1.08 X10(3) UL (ref 1–4)
LYMPHOCYTES NFR BLD AUTO: 25.3 %
MCH RBC QN AUTO: 26.5 PG (ref 26–34)
MCHC RBC AUTO-ENTMCNC: 31.7 G/DL (ref 31–37)
MCV RBC AUTO: 83.7 FL
MONOCYTES # BLD AUTO: 0.26 X10(3) UL (ref 0.1–1)
MONOCYTES NFR BLD AUTO: 6.1 %
NEUTROPHILS # BLD AUTO: 2.67 X10 (3) UL (ref 1.5–7.7)
NEUTROPHILS # BLD AUTO: 2.67 X10(3) UL (ref 1.5–7.7)
NEUTROPHILS NFR BLD AUTO: 62.5 %
OSMOLALITY SERPL CALC.SUM OF ELEC: 294 MOSM/KG (ref 275–295)
PLATELET # BLD AUTO: 257 10(3)UL (ref 150–450)
POTASSIUM SERPL-SCNC: 3.7 MMOL/L (ref 3.5–5.1)
PROT SERPL-MCNC: 6.9 G/DL (ref 6.4–8.2)
RBC # BLD AUTO: 4.04 X10(6)UL
SODIUM SERPL-SCNC: 139 MMOL/L (ref 136–145)
WBC # BLD AUTO: 4.3 X10(3) UL (ref 4–11)

## 2023-03-29 PROCEDURE — 99215 OFFICE O/P EST HI 40 MIN: CPT | Performed by: NURSE PRACTITIONER

## 2023-03-29 PROCEDURE — 80053 COMPREHEN METABOLIC PANEL: CPT

## 2023-03-29 PROCEDURE — 85025 COMPLETE CBC W/AUTO DIFF WBC: CPT

## 2023-03-29 PROCEDURE — S0028 INJECTION, FAMOTIDINE, 20 MG: HCPCS | Performed by: NURSE PRACTITIONER

## 2023-03-29 PROCEDURE — 96413 CHEMO IV INFUSION 1 HR: CPT

## 2023-03-29 PROCEDURE — 96375 TX/PRO/DX INJ NEW DRUG ADDON: CPT

## 2023-03-29 PROCEDURE — 96417 CHEMO IV INFUS EACH ADDL SEQ: CPT

## 2023-03-29 RX ORDER — FAMOTIDINE 10 MG/ML
20 INJECTION, SOLUTION INTRAVENOUS ONCE
Status: COMPLETED | OUTPATIENT
Start: 2023-03-29 | End: 2023-03-29

## 2023-03-29 RX ORDER — DIPHENHYDRAMINE HYDROCHLORIDE 50 MG/ML
25 INJECTION INTRAMUSCULAR; INTRAVENOUS ONCE
Status: CANCELLED | OUTPATIENT
Start: 2023-03-29

## 2023-03-29 RX ORDER — DIPHENHYDRAMINE HYDROCHLORIDE 50 MG/ML
25 INJECTION INTRAMUSCULAR; INTRAVENOUS ONCE
Status: COMPLETED | OUTPATIENT
Start: 2023-03-29 | End: 2023-03-29

## 2023-03-29 RX ORDER — FAMOTIDINE 10 MG/ML
20 INJECTION, SOLUTION INTRAVENOUS ONCE
Status: CANCELLED | OUTPATIENT
Start: 2023-03-29

## 2023-03-29 RX ADMIN — DIPHENHYDRAMINE HYDROCHLORIDE 25 MG: 50 INJECTION INTRAMUSCULAR; INTRAVENOUS at 11:48:00

## 2023-03-29 RX ADMIN — FAMOTIDINE 20 MG: 10 INJECTION, SOLUTION INTRAVENOUS at 11:48:00

## 2023-03-29 NOTE — PROGRESS NOTES
Patient presents with:  Chemotherapy: APN assessment for treatment    Pt is here today for cycle 1 day 15 of Taxol and Trazimera. She states she is feeling good today. Pt said  last week Friday and the weekend was bad. She was having problem with nausea, dry heaves, and diarrhea. Pt has no complaints today. Denies V,D,N,C and fevers.       Education Record    Learner:  Patient and Spouse    Disease / Diagnosis: breast cancer    Barriers / Limitations:  None   Comments:    Method:  Brief focused   Comments:    General Topics:  Plan of care reviewed   Comments:    Outcome:  Shows understanding   Comments:

## 2023-03-29 NOTE — PROGRESS NOTES
Pt here for C1D15. Arrives Ambulating independently, accompanied by Spouse           Pregnancy screening: Not applicable    Modifications in dose or schedule: No    Drugs/infusions dual verified for appearance and physical integrity. IV pump settings were dual verified: yes     Frequency of blood return and site check throughout administration: At completion of therapy, before administration   Discharged to Home, Ambulating independently, accompanied by:Self    Outpatient Oncology Care Plan  Problem list:  fatigue  Problems related to:  chemotherapy  Interventions:  promoted rest  provided general teaching  Expected outcomes:  understands plan of care  Progress towards outcome:  making progress    Education Record    Learner:  Patient  Barriers / Limitations:  None  Method:  Discussion and Reinforcement  Outcome:  Shows understanding  Comments: Pt  made multiple treatment appts at  without RN's knowledge. This was reported to MD nurse and charge. Explained to pt that appts are made based on nurse and doctor availability. Pt tolerated infusion. Pt left in stable condition.

## 2023-03-30 ENCOUNTER — NURSE NAVIGATOR ENCOUNTER (OUTPATIENT)
Dept: HEMATOLOGY/ONCOLOGY | Facility: HOSPITAL | Age: 78
End: 2023-03-30

## 2023-03-30 NOTE — PROGRESS NOTES
Received phone call from patient's  in regards to questions he had about the taxol dose his wife had at her last chemotherapy that the dose has changed. I stated I would contact Dr. Trevor Luque nurse in regards to his questions. He stated that his insurance called the breast nurse navigators yesterday and we provided [facility] tax ID and he wanted to proceed with scheduling a genetics counseling appointment. Working on the patient's and genetics counselor's schedule to get an appointment for the patient and will contact them in regards to their question about taxol once Dr. Trevor Luque team responds. He thanked me for the assistance and was provided with the breast nurse navigators contact information and was encouraged to phone with any other questions or concerns.

## 2023-03-30 NOTE — PROGRESS NOTES
Patient's  called back and discussed with him about speaking with Dr. Yobany Brown RN that patient's taxol dose had not changed and that we can scheduled genetics appointment. Scheduled patient with Jan Stringer for Tuesday April 18, 2023 at [id number] at the [facility]. He thanked me for the assistance. Pt was provided with the breast nurse navigators contact information and was encouraged to phone with any other questions or concerns.

## 2023-04-04 ENCOUNTER — APPOINTMENT (OUTPATIENT)
Dept: HEMATOLOGY/ONCOLOGY | Facility: HOSPITAL | Age: 78
End: 2023-04-04
Attending: SURGERY
Payer: COMMERCIAL

## 2023-04-05 ENCOUNTER — APPOINTMENT (OUTPATIENT)
Dept: HEMATOLOGY/ONCOLOGY | Facility: HOSPITAL | Age: 78
End: 2023-04-05
Attending: SURGERY
Payer: COMMERCIAL

## 2023-04-05 VITALS
OXYGEN SATURATION: 99 % | WEIGHT: 174 LBS | HEART RATE: 72 BPM | HEIGHT: 63.19 IN | TEMPERATURE: 97 F | SYSTOLIC BLOOD PRESSURE: 142 MMHG | BODY MASS INDEX: 30.45 KG/M2 | DIASTOLIC BLOOD PRESSURE: 73 MMHG | RESPIRATION RATE: 16 BRPM

## 2023-04-05 DIAGNOSIS — Z17.1 MALIGNANT NEOPLASM OF UPPER-OUTER QUADRANT OF LEFT BREAST IN FEMALE, ESTROGEN RECEPTOR NEGATIVE (HCC): Primary | ICD-10-CM

## 2023-04-05 DIAGNOSIS — T45.1X5D ADVERSE EFFECT OF CHEMOTHERAPY, SUBSEQUENT ENCOUNTER: ICD-10-CM

## 2023-04-05 DIAGNOSIS — C50.412 MALIGNANT NEOPLASM OF UPPER-OUTER QUADRANT OF LEFT BREAST IN FEMALE, ESTROGEN RECEPTOR NEGATIVE (HCC): Primary | ICD-10-CM

## 2023-04-05 DIAGNOSIS — K59.09 OTHER CONSTIPATION: ICD-10-CM

## 2023-04-05 LAB
ALBUMIN SERPL-MCNC: 3.1 G/DL (ref 3.4–5)
ALBUMIN/GLOB SERPL: 0.9 {RATIO} (ref 1–2)
ALP LIVER SERPL-CCNC: 74 U/L
ALT SERPL-CCNC: 38 U/L
ANION GAP SERPL CALC-SCNC: 6 MMOL/L (ref 0–18)
AST SERPL-CCNC: 22 U/L (ref 15–37)
BASOPHILS # BLD AUTO: 0.03 X10(3) UL (ref 0–0.2)
BASOPHILS NFR BLD AUTO: 0.8 %
BILIRUB SERPL-MCNC: 0.3 MG/DL (ref 0.1–2)
BUN BLD-MCNC: 23 MG/DL (ref 7–18)
CALCIUM BLD-MCNC: 8.8 MG/DL (ref 8.5–10.1)
CHLORIDE SERPL-SCNC: 105 MMOL/L (ref 98–112)
CO2 SERPL-SCNC: 29 MMOL/L (ref 21–32)
CREAT BLD-MCNC: 0.8 MG/DL
EOSINOPHIL # BLD AUTO: 0.12 X10(3) UL (ref 0–0.7)
EOSINOPHIL NFR BLD AUTO: 3.1 %
ERYTHROCYTE [DISTWIDTH] IN BLOOD BY AUTOMATED COUNT: 15 %
GFR SERPLBLD BASED ON 1.73 SQ M-ARVRAT: 75 ML/MIN/1.73M2 (ref 60–?)
GLOBULIN PLAS-MCNC: 3.5 G/DL (ref 2.8–4.4)
GLUCOSE BLD-MCNC: 104 MG/DL (ref 70–99)
HCT VFR BLD AUTO: 31.9 %
HGB BLD-MCNC: 10.3 G/DL
IMM GRANULOCYTES # BLD AUTO: 0.05 X10(3) UL (ref 0–1)
IMM GRANULOCYTES NFR BLD: 1.3 %
LYMPHOCYTES # BLD AUTO: 1.17 X10(3) UL (ref 1–4)
LYMPHOCYTES NFR BLD AUTO: 30.3 %
MCH RBC QN AUTO: 26.3 PG (ref 26–34)
MCHC RBC AUTO-ENTMCNC: 32.3 G/DL (ref 31–37)
MCV RBC AUTO: 81.4 FL
MONOCYTES # BLD AUTO: 0.3 X10(3) UL (ref 0.1–1)
MONOCYTES NFR BLD AUTO: 7.8 %
NEUTROPHILS # BLD AUTO: 2.19 X10 (3) UL (ref 1.5–7.7)
NEUTROPHILS # BLD AUTO: 2.19 X10(3) UL (ref 1.5–7.7)
NEUTROPHILS NFR BLD AUTO: 56.7 %
OSMOLALITY SERPL CALC.SUM OF ELEC: 294 MOSM/KG (ref 275–295)
PLATELET # BLD AUTO: 265 10(3)UL (ref 150–450)
POTASSIUM SERPL-SCNC: 3.6 MMOL/L (ref 3.5–5.1)
PROT SERPL-MCNC: 6.6 G/DL (ref 6.4–8.2)
RBC # BLD AUTO: 3.92 X10(6)UL
SODIUM SERPL-SCNC: 140 MMOL/L (ref 136–145)
WBC # BLD AUTO: 3.9 X10(3) UL (ref 4–11)

## 2023-04-05 PROCEDURE — 96375 TX/PRO/DX INJ NEW DRUG ADDON: CPT

## 2023-04-05 PROCEDURE — 96417 CHEMO IV INFUS EACH ADDL SEQ: CPT

## 2023-04-05 PROCEDURE — 85025 COMPLETE CBC W/AUTO DIFF WBC: CPT

## 2023-04-05 PROCEDURE — S0028 INJECTION, FAMOTIDINE, 20 MG: HCPCS | Performed by: INTERNAL MEDICINE

## 2023-04-05 PROCEDURE — 80053 COMPREHEN METABOLIC PANEL: CPT

## 2023-04-05 PROCEDURE — 96413 CHEMO IV INFUSION 1 HR: CPT

## 2023-04-05 PROCEDURE — 99215 OFFICE O/P EST HI 40 MIN: CPT | Performed by: INTERNAL MEDICINE

## 2023-04-05 RX ORDER — DIPHENHYDRAMINE HYDROCHLORIDE 50 MG/ML
25 INJECTION INTRAMUSCULAR; INTRAVENOUS ONCE
Status: CANCELLED | OUTPATIENT
Start: 2023-04-05

## 2023-04-05 RX ORDER — DIPHENHYDRAMINE HYDROCHLORIDE 50 MG/ML
25 INJECTION INTRAMUSCULAR; INTRAVENOUS ONCE
Status: COMPLETED | OUTPATIENT
Start: 2023-04-05 | End: 2023-04-05

## 2023-04-05 RX ORDER — FAMOTIDINE 10 MG/ML
20 INJECTION, SOLUTION INTRAVENOUS ONCE
Status: COMPLETED | OUTPATIENT
Start: 2023-04-05 | End: 2023-04-05

## 2023-04-05 RX ORDER — FAMOTIDINE 10 MG/ML
20 INJECTION, SOLUTION INTRAVENOUS ONCE
Status: CANCELLED | OUTPATIENT
Start: 2023-04-05

## 2023-04-05 RX ADMIN — DIPHENHYDRAMINE HYDROCHLORIDE 25 MG: 50 INJECTION INTRAMUSCULAR; INTRAVENOUS at 12:49:00

## 2023-04-05 RX ADMIN — FAMOTIDINE 20 MG: 10 INJECTION, SOLUTION INTRAVENOUS at 12:49:00

## 2023-04-05 NOTE — PROGRESS NOTES
Pt here for C 2 D 1 . Arrives Ambulating independently, accompanied by Self and Spouse           Pregnancy screening: Not applicable    Modifications in dose or schedule: No    Drugs/infusions dual verified for appearance and physical integrity.  IV pump settings were dual verified: yes     Frequency of blood return and site check throughout administration: Prior to administration, Prior to each drug and At completion of therapy   Discharged to Home, Ambulating independently, accompanied by:Self and Spouse    Outpatient Oncology Care Plan  Problem list:  anemia  ineffective patient coping  hair loss  fatigue  knowledge deficit  nausea and vomiting  peripheral neuropathy  Problems related to:  chemotherapy  Interventions:  monitor effect of therapy  monitor lab values  promoted rest  Expected outcomes:  safe in environment  symptoms relieved/minimized  understands plan of care  verbalize how to care for self  Progress towards outcome:  unchanged    Education Record    Learner:  Patient and Spouse  Barriers / Limitations:  None  Method:  Discussion  Outcome:  Shows understanding  Comments:

## 2023-04-11 ENCOUNTER — OFFICE VISIT (OUTPATIENT)
Dept: HEMATOLOGY/ONCOLOGY | Facility: HOSPITAL | Age: 78
End: 2023-04-11
Attending: SURGERY
Payer: COMMERCIAL

## 2023-04-11 VITALS
HEIGHT: 63.19 IN | SYSTOLIC BLOOD PRESSURE: 136 MMHG | BODY MASS INDEX: 30.77 KG/M2 | TEMPERATURE: 97 F | RESPIRATION RATE: 20 BRPM | OXYGEN SATURATION: 96 % | HEART RATE: 80 BPM | WEIGHT: 175.81 LBS | DIASTOLIC BLOOD PRESSURE: 82 MMHG

## 2023-04-11 DIAGNOSIS — T45.1X5D ADVERSE EFFECT OF CHEMOTHERAPY, SUBSEQUENT ENCOUNTER: ICD-10-CM

## 2023-04-11 DIAGNOSIS — C50.412 MALIGNANT NEOPLASM OF UPPER-OUTER QUADRANT OF LEFT BREAST IN FEMALE, ESTROGEN RECEPTOR NEGATIVE (HCC): Primary | ICD-10-CM

## 2023-04-11 DIAGNOSIS — Z17.1 MALIGNANT NEOPLASM OF UPPER-OUTER QUADRANT OF LEFT BREAST IN FEMALE, ESTROGEN RECEPTOR NEGATIVE (HCC): Primary | ICD-10-CM

## 2023-04-11 LAB
BASOPHILS # BLD AUTO: 0.02 X10(3) UL (ref 0–0.2)
BASOPHILS NFR BLD AUTO: 0.5 %
EOSINOPHIL # BLD AUTO: 0.06 X10(3) UL (ref 0–0.7)
EOSINOPHIL NFR BLD AUTO: 1.5 %
ERYTHROCYTE [DISTWIDTH] IN BLOOD BY AUTOMATED COUNT: 15.5 %
HCT VFR BLD AUTO: 32.8 %
HGB BLD-MCNC: 10.5 G/DL
IMM GRANULOCYTES # BLD AUTO: 0.08 X10(3) UL (ref 0–1)
IMM GRANULOCYTES NFR BLD: 2 %
LYMPHOCYTES # BLD AUTO: 1.1 X10(3) UL (ref 1–4)
LYMPHOCYTES NFR BLD AUTO: 27.2 %
MCH RBC QN AUTO: 26.6 PG (ref 26–34)
MCHC RBC AUTO-ENTMCNC: 32 G/DL (ref 31–37)
MCV RBC AUTO: 83.2 FL
MONOCYTES # BLD AUTO: 0.21 X10(3) UL (ref 0.1–1)
MONOCYTES NFR BLD AUTO: 5.2 %
NEUTROPHILS # BLD AUTO: 2.57 X10 (3) UL (ref 1.5–7.7)
NEUTROPHILS # BLD AUTO: 2.57 X10(3) UL (ref 1.5–7.7)
NEUTROPHILS NFR BLD AUTO: 63.6 %
PLATELET # BLD AUTO: 256 10(3)UL (ref 150–450)
RBC # BLD AUTO: 3.94 X10(6)UL
WBC # BLD AUTO: 4 X10(3) UL (ref 4–11)

## 2023-04-11 PROCEDURE — S0028 INJECTION, FAMOTIDINE, 20 MG: HCPCS | Performed by: INTERNAL MEDICINE

## 2023-04-11 PROCEDURE — 96375 TX/PRO/DX INJ NEW DRUG ADDON: CPT

## 2023-04-11 PROCEDURE — 96413 CHEMO IV INFUSION 1 HR: CPT

## 2023-04-11 PROCEDURE — 85025 COMPLETE CBC W/AUTO DIFF WBC: CPT

## 2023-04-11 PROCEDURE — 99215 OFFICE O/P EST HI 40 MIN: CPT | Performed by: INTERNAL MEDICINE

## 2023-04-11 PROCEDURE — 96417 CHEMO IV INFUS EACH ADDL SEQ: CPT

## 2023-04-11 RX ORDER — DIPHENHYDRAMINE HYDROCHLORIDE 50 MG/ML
25 INJECTION INTRAMUSCULAR; INTRAVENOUS ONCE
Status: CANCELLED | OUTPATIENT
Start: 2023-04-11

## 2023-04-11 RX ORDER — DIPHENHYDRAMINE HYDROCHLORIDE 50 MG/ML
25 INJECTION INTRAMUSCULAR; INTRAVENOUS ONCE
Status: COMPLETED | OUTPATIENT
Start: 2023-04-11 | End: 2023-04-11

## 2023-04-11 RX ORDER — FAMOTIDINE 10 MG/ML
20 INJECTION, SOLUTION INTRAVENOUS ONCE
Status: COMPLETED | OUTPATIENT
Start: 2023-04-11 | End: 2023-04-11

## 2023-04-11 RX ORDER — FAMOTIDINE 10 MG/ML
20 INJECTION, SOLUTION INTRAVENOUS ONCE
Status: CANCELLED | OUTPATIENT
Start: 2023-04-11

## 2023-04-11 RX ADMIN — DIPHENHYDRAMINE HYDROCHLORIDE 25 MG: 50 INJECTION INTRAMUSCULAR; INTRAVENOUS at 10:47:00

## 2023-04-11 RX ADMIN — FAMOTIDINE 20 MG: 10 INJECTION, SOLUTION INTRAVENOUS at 10:52:00

## 2023-04-11 NOTE — PROGRESS NOTES
Pt here for C2D8 Taxol/Trazimera. Arrives Ambulating independently, accompanied by Spouse       Oral medications included in this regimen:  no    Patient confirms comprehension of cancer treatment schedule:  yes    Pregnancy screening:  Denies possibility of pregnancy    Modifications in dose or schedule:  No    Medications appearance and physical integrity checked by RN. Chemotherapy IV pump settings verified by 2 RNs:  yes     Frequency of blood return and site check throughout administration: Prior to administration, Every 2-3 ml IVP and At completion of therapy     Infusion/treatment outcome:  patient tolerated treatment without incident    Education Record    Learner:  Patient and Spouse  Barriers / Limitations:  None  Method:  Brief focused and Reinforcement  Outcome:  Shows understanding    Discharged Home, Ambulating independently, accompanied by:Spouse    Patient/family verbalized understanding of future appointments: by printed AVS  Patient given a copy of her appointment in a calendar format as requested by her spouse.

## 2023-04-11 NOTE — PROGRESS NOTES
Education Record    Learner:  Patient/ spouse    Disease / Diagnosis:breast ca  OTV D8 C2 Herceptin/ Taxol    Barriers / Limitations:  None   Comments:    Method:  Discussion   Comments:    General Topics:  Plan of care reviewed   Comments:    Outcome:  Shows understanding   Comments:  Feeling well. Energy is good  Reports some stomach upset, not nausea, but bloating and feels full, some burning pain. Burping more. Appetite is lower at times. Diarrhea day after, a little constipation then. Eating and drinking ok, pushes herself when appetite is low.

## 2023-04-18 ENCOUNTER — OFFICE VISIT (OUTPATIENT)
Dept: HEMATOLOGY/ONCOLOGY | Facility: HOSPITAL | Age: 78
End: 2023-04-18
Attending: SURGERY
Payer: COMMERCIAL

## 2023-04-18 ENCOUNTER — GENETICS ENCOUNTER (OUTPATIENT)
Dept: GENETICS | Facility: HOSPITAL | Age: 78
End: 2023-04-18
Attending: SURGERY
Payer: COMMERCIAL

## 2023-04-18 VITALS
HEIGHT: 63.19 IN | HEART RATE: 73 BPM | RESPIRATION RATE: 18 BRPM | TEMPERATURE: 99 F | BODY MASS INDEX: 30.62 KG/M2 | WEIGHT: 175 LBS | DIASTOLIC BLOOD PRESSURE: 81 MMHG | OXYGEN SATURATION: 96 % | SYSTOLIC BLOOD PRESSURE: 150 MMHG

## 2023-04-18 DIAGNOSIS — Z17.1 MALIGNANT NEOPLASM OF UPPER-OUTER QUADRANT OF LEFT BREAST IN FEMALE, ESTROGEN RECEPTOR NEGATIVE (HCC): Primary | ICD-10-CM

## 2023-04-18 DIAGNOSIS — C50.412 MALIGNANT NEOPLASM OF UPPER-OUTER QUADRANT OF LEFT BREAST IN FEMALE, ESTROGEN RECEPTOR NEGATIVE (HCC): Primary | ICD-10-CM

## 2023-04-18 DIAGNOSIS — Z17.1 MALIGNANT NEOPLASM OF LEFT BREAST IN FEMALE, ESTROGEN RECEPTOR NEGATIVE, UNSPECIFIED SITE OF BREAST (HCC): Primary | ICD-10-CM

## 2023-04-18 DIAGNOSIS — E87.6 HYPOKALEMIA: ICD-10-CM

## 2023-04-18 DIAGNOSIS — Z80.41 FAMILY HISTORY OF OVARIAN CANCER: ICD-10-CM

## 2023-04-18 DIAGNOSIS — C50.912 MALIGNANT NEOPLASM OF LEFT BREAST IN FEMALE, ESTROGEN RECEPTOR NEGATIVE, UNSPECIFIED SITE OF BREAST (HCC): Primary | ICD-10-CM

## 2023-04-18 LAB
ALBUMIN SERPL-MCNC: 3 G/DL (ref 3.4–5)
ALBUMIN/GLOB SERPL: 0.9 {RATIO} (ref 1–2)
ALP LIVER SERPL-CCNC: 71 U/L
ALT SERPL-CCNC: 38 U/L
ANION GAP SERPL CALC-SCNC: 1 MMOL/L (ref 0–18)
AST SERPL-CCNC: 20 U/L (ref 15–37)
BASOPHILS # BLD AUTO: 0.03 X10(3) UL (ref 0–0.2)
BASOPHILS NFR BLD AUTO: 0.7 %
BILIRUB SERPL-MCNC: 0.3 MG/DL (ref 0.1–2)
BUN BLD-MCNC: 23 MG/DL (ref 7–18)
CALCIUM BLD-MCNC: 8.5 MG/DL (ref 8.5–10.1)
CHLORIDE SERPL-SCNC: 108 MMOL/L (ref 98–112)
CO2 SERPL-SCNC: 29 MMOL/L (ref 21–32)
CREAT BLD-MCNC: 0.81 MG/DL
EOSINOPHIL # BLD AUTO: 0.18 X10(3) UL (ref 0–0.7)
EOSINOPHIL NFR BLD AUTO: 4.2 %
ERYTHROCYTE [DISTWIDTH] IN BLOOD BY AUTOMATED COUNT: 15.8 %
GFR SERPLBLD BASED ON 1.73 SQ M-ARVRAT: 74 ML/MIN/1.73M2 (ref 60–?)
GLOBULIN PLAS-MCNC: 3.5 G/DL (ref 2.8–4.4)
GLUCOSE BLD-MCNC: 126 MG/DL (ref 70–99)
HCT VFR BLD AUTO: 31.4 %
HGB BLD-MCNC: 10.2 G/DL
IMM GRANULOCYTES # BLD AUTO: 0.07 X10(3) UL (ref 0–1)
IMM GRANULOCYTES NFR BLD: 1.6 %
LYMPHOCYTES # BLD AUTO: 0.99 X10(3) UL (ref 1–4)
LYMPHOCYTES NFR BLD AUTO: 23 %
MCH RBC QN AUTO: 27.1 PG (ref 26–34)
MCHC RBC AUTO-ENTMCNC: 32.5 G/DL (ref 31–37)
MCV RBC AUTO: 83.3 FL
MONOCYTES # BLD AUTO: 0.26 X10(3) UL (ref 0.1–1)
MONOCYTES NFR BLD AUTO: 6 %
NEUTROPHILS # BLD AUTO: 2.77 X10 (3) UL (ref 1.5–7.7)
NEUTROPHILS # BLD AUTO: 2.77 X10(3) UL (ref 1.5–7.7)
NEUTROPHILS NFR BLD AUTO: 64.5 %
OSMOLALITY SERPL CALC.SUM OF ELEC: 291 MOSM/KG (ref 275–295)
PLATELET # BLD AUTO: 248 10(3)UL (ref 150–450)
POTASSIUM SERPL-SCNC: 3.3 MMOL/L (ref 3.5–5.1)
PROT SERPL-MCNC: 6.5 G/DL (ref 6.4–8.2)
RBC # BLD AUTO: 3.77 X10(6)UL
SODIUM SERPL-SCNC: 138 MMOL/L (ref 136–145)
WBC # BLD AUTO: 4.3 X10(3) UL (ref 4–11)

## 2023-04-18 PROCEDURE — 85025 COMPLETE CBC W/AUTO DIFF WBC: CPT

## 2023-04-18 PROCEDURE — S0028 INJECTION, FAMOTIDINE, 20 MG: HCPCS | Performed by: NURSE PRACTITIONER

## 2023-04-18 PROCEDURE — 96417 CHEMO IV INFUS EACH ADDL SEQ: CPT

## 2023-04-18 PROCEDURE — 96375 TX/PRO/DX INJ NEW DRUG ADDON: CPT

## 2023-04-18 PROCEDURE — 80053 COMPREHEN METABOLIC PANEL: CPT

## 2023-04-18 PROCEDURE — 96413 CHEMO IV INFUSION 1 HR: CPT

## 2023-04-18 PROCEDURE — 99215 OFFICE O/P EST HI 40 MIN: CPT | Performed by: NURSE PRACTITIONER

## 2023-04-18 PROCEDURE — 96040 HC GENETIC COUNSELING EA 30 MIN: CPT | Performed by: GENETIC COUNSELOR, MS

## 2023-04-18 RX ORDER — FAMOTIDINE 10 MG/ML
20 INJECTION, SOLUTION INTRAVENOUS ONCE
Status: CANCELLED | OUTPATIENT
Start: 2023-04-18

## 2023-04-18 RX ORDER — DIPHENHYDRAMINE HYDROCHLORIDE 50 MG/ML
25 INJECTION INTRAMUSCULAR; INTRAVENOUS ONCE
Status: CANCELLED | OUTPATIENT
Start: 2023-04-18

## 2023-04-18 RX ORDER — DIPHENHYDRAMINE HYDROCHLORIDE 50 MG/ML
25 INJECTION INTRAMUSCULAR; INTRAVENOUS ONCE
Status: COMPLETED | OUTPATIENT
Start: 2023-04-18 | End: 2023-04-18

## 2023-04-18 RX ORDER — FAMOTIDINE 10 MG/ML
20 INJECTION, SOLUTION INTRAVENOUS ONCE
Status: COMPLETED | OUTPATIENT
Start: 2023-04-18 | End: 2023-04-18

## 2023-04-18 RX ADMIN — DIPHENHYDRAMINE HYDROCHLORIDE 25 MG: 50 INJECTION INTRAMUSCULAR; INTRAVENOUS at 12:02:00

## 2023-04-18 RX ADMIN — FAMOTIDINE 20 MG: 10 INJECTION, SOLUTION INTRAVENOUS at 12:03:00

## 2023-04-18 NOTE — PROGRESS NOTES
Pt here for C2D15. Arrives Ambulating independently, accompanied by Spouse         Patient confirms comprehension of cancer treatment schedule:  yes    Pregnancy screening:  Not applicable    Modifications in dose or schedule:  No    Medications appearance and physical integrity checked by RN.  Chemotherapy IV pump settings verified by 2 RNs:  yes     Frequency of blood return and site check throughout administration: Prior to administration and At completion of therapy     Infusion/treatment outcome:  patient tolerated treatment without incident    Education Record    Learner:  Patient and Spouse  Barriers / Limitations:  None  Outcome:  Shows understanding    Discharged Home, Ambulating independently, accompanied by:Spouse    Patient/family verbalized understanding of future appointments: by [facility]

## 2023-04-18 NOTE — PROGRESS NOTES
Patient presents with: Follow - Up: APN assessment prior to treatment  Chemotherapy    Pt is here for treatment - C2 D15 taxol/trastuzumab is expected. Has been eating and drinking; but does have early fullness and more acid. Some nausea but no vomiting; constipation the day after treatment with occasional diarrhea. This last cycle she had some vaginal spotting on the 3rd day after treatment.      Education Record    Learner:  Patient and Spouse    Disease / Diagnosis: breast cancer    Barriers / Limitations:  None   Comments:    Method:  Brief focused   Comments:    General Topics:  Diet, Medication, Side effects and symptom management and Plan of care reviewed   Comments:    Outcome:  Shows understanding   Comments:

## 2023-04-25 ENCOUNTER — TELEPHONE (OUTPATIENT)
Dept: HEMATOLOGY/ONCOLOGY | Facility: HOSPITAL | Age: 78
End: 2023-04-25

## 2023-04-25 NOTE — TELEPHONE ENCOUNTER
Patient called overnight to report bump on hand, spoke to Dr. Daniela Cruz. Called patient today for update on symptoms. She reports swelling to middle finger has decreased and small bruise on bottom of dorsal middle finger. She denies any open lesions or wounds, no drainage. She has appointment with Dr. Luz Dey tomorrow.

## 2023-04-26 ENCOUNTER — OFFICE VISIT (OUTPATIENT)
Dept: HEMATOLOGY/ONCOLOGY | Facility: HOSPITAL | Age: 78
End: 2023-04-26
Attending: SURGERY
Payer: COMMERCIAL

## 2023-04-26 ENCOUNTER — NURSE NAVIGATOR ENCOUNTER (OUTPATIENT)
Dept: HEMATOLOGY/ONCOLOGY | Facility: HOSPITAL | Age: 78
End: 2023-04-26

## 2023-04-26 ENCOUNTER — TELEPHONE (OUTPATIENT)
Facility: LOCATION | Age: 78
End: 2023-04-26

## 2023-04-26 VITALS
SYSTOLIC BLOOD PRESSURE: 127 MMHG | DIASTOLIC BLOOD PRESSURE: 74 MMHG | HEIGHT: 63.19 IN | BODY MASS INDEX: 30.8 KG/M2 | HEART RATE: 72 BPM | TEMPERATURE: 97 F | WEIGHT: 176 LBS | OXYGEN SATURATION: 96 %

## 2023-04-26 DIAGNOSIS — E55.9 VITAMIN D DEFICIENCY: ICD-10-CM

## 2023-04-26 DIAGNOSIS — C50.412 MALIGNANT NEOPLASM OF UPPER-OUTER QUADRANT OF LEFT BREAST IN FEMALE, ESTROGEN RECEPTOR NEGATIVE (HCC): ICD-10-CM

## 2023-04-26 DIAGNOSIS — Z17.1 MALIGNANT NEOPLASM OF UPPER-OUTER QUADRANT OF LEFT BREAST IN FEMALE, ESTROGEN RECEPTOR NEGATIVE (HCC): Primary | ICD-10-CM

## 2023-04-26 DIAGNOSIS — C50.412 MALIGNANT NEOPLASM OF UPPER-OUTER QUADRANT OF LEFT BREAST IN FEMALE, ESTROGEN RECEPTOR NEGATIVE (HCC): Primary | ICD-10-CM

## 2023-04-26 DIAGNOSIS — T45.1X5D ADVERSE EFFECT OF CHEMOTHERAPY, SUBSEQUENT ENCOUNTER: ICD-10-CM

## 2023-04-26 DIAGNOSIS — Z17.1 MALIGNANT NEOPLASM OF UPPER-OUTER QUADRANT OF LEFT BREAST IN FEMALE, ESTROGEN RECEPTOR NEGATIVE (HCC): ICD-10-CM

## 2023-04-26 DIAGNOSIS — E55.9 VITAMIN D DEFICIENCY: Primary | ICD-10-CM

## 2023-04-26 LAB
ALBUMIN SERPL-MCNC: 2.9 G/DL (ref 3.4–5)
ALBUMIN/GLOB SERPL: 0.8 {RATIO} (ref 1–2)
ALP LIVER SERPL-CCNC: 69 U/L
ALT SERPL-CCNC: 39 U/L
ANION GAP SERPL CALC-SCNC: 3 MMOL/L (ref 0–18)
AST SERPL-CCNC: 26 U/L (ref 15–37)
BASOPHILS # BLD AUTO: 0.04 X10(3) UL (ref 0–0.2)
BASOPHILS NFR BLD AUTO: 0.9 %
BILIRUB SERPL-MCNC: 0.3 MG/DL (ref 0.1–2)
BUN BLD-MCNC: 23 MG/DL (ref 7–18)
CALCIUM BLD-MCNC: 9.2 MG/DL (ref 8.5–10.1)
CHLORIDE SERPL-SCNC: 108 MMOL/L (ref 98–112)
CO2 SERPL-SCNC: 29 MMOL/L (ref 21–32)
CREAT BLD-MCNC: 0.91 MG/DL
EOSINOPHIL # BLD AUTO: 0.25 X10(3) UL (ref 0–0.7)
EOSINOPHIL NFR BLD AUTO: 5.8 %
ERYTHROCYTE [DISTWIDTH] IN BLOOD BY AUTOMATED COUNT: 16.3 %
FASTING STATUS PATIENT QL REPORTED: NO
GFR SERPLBLD BASED ON 1.73 SQ M-ARVRAT: 65 ML/MIN/1.73M2 (ref 60–?)
GLOBULIN PLAS-MCNC: 3.6 G/DL (ref 2.8–4.4)
GLUCOSE BLD-MCNC: 120 MG/DL (ref 70–99)
HCT VFR BLD AUTO: 30.9 %
HGB BLD-MCNC: 9.9 G/DL
IMM GRANULOCYTES # BLD AUTO: 0.12 X10(3) UL (ref 0–1)
IMM GRANULOCYTES NFR BLD: 2.8 %
LYMPHOCYTES # BLD AUTO: 1.13 X10(3) UL (ref 1–4)
LYMPHOCYTES NFR BLD AUTO: 26.1 %
MCH RBC QN AUTO: 26.7 PG (ref 26–34)
MCHC RBC AUTO-ENTMCNC: 32 G/DL (ref 31–37)
MCV RBC AUTO: 83.3 FL
MONOCYTES # BLD AUTO: 0.49 X10(3) UL (ref 0.1–1)
MONOCYTES NFR BLD AUTO: 11.3 %
NEUTROPHILS # BLD AUTO: 2.3 X10 (3) UL (ref 1.5–7.7)
NEUTROPHILS # BLD AUTO: 2.3 X10(3) UL (ref 1.5–7.7)
NEUTROPHILS NFR BLD AUTO: 53.1 %
OSMOLALITY SERPL CALC.SUM OF ELEC: 295 MOSM/KG (ref 275–295)
PLATELET # BLD AUTO: 260 10(3)UL (ref 150–450)
POTASSIUM SERPL-SCNC: 3.4 MMOL/L (ref 3.5–5.1)
PROT SERPL-MCNC: 6.5 G/DL (ref 6.4–8.2)
RBC # BLD AUTO: 3.71 X10(6)UL
SODIUM SERPL-SCNC: 140 MMOL/L (ref 136–145)
VIT D+METAB SERPL-MCNC: 52.7 NG/ML (ref 30–100)
WBC # BLD AUTO: 4.3 X10(3) UL (ref 4–11)

## 2023-04-26 PROCEDURE — 82306 VITAMIN D 25 HYDROXY: CPT

## 2023-04-26 PROCEDURE — 96375 TX/PRO/DX INJ NEW DRUG ADDON: CPT

## 2023-04-26 PROCEDURE — 99215 OFFICE O/P EST HI 40 MIN: CPT | Performed by: INTERNAL MEDICINE

## 2023-04-26 PROCEDURE — 80053 COMPREHEN METABOLIC PANEL: CPT

## 2023-04-26 PROCEDURE — 96413 CHEMO IV INFUSION 1 HR: CPT

## 2023-04-26 PROCEDURE — S0028 INJECTION, FAMOTIDINE, 20 MG: HCPCS | Performed by: INTERNAL MEDICINE

## 2023-04-26 PROCEDURE — 85025 COMPLETE CBC W/AUTO DIFF WBC: CPT

## 2023-04-26 PROCEDURE — 96417 CHEMO IV INFUS EACH ADDL SEQ: CPT

## 2023-04-26 RX ORDER — DIPHENHYDRAMINE HYDROCHLORIDE 50 MG/ML
25 INJECTION INTRAMUSCULAR; INTRAVENOUS ONCE
Status: COMPLETED | OUTPATIENT
Start: 2023-04-26 | End: 2023-04-26

## 2023-04-26 RX ORDER — DIPHENHYDRAMINE HYDROCHLORIDE 50 MG/ML
25 INJECTION INTRAMUSCULAR; INTRAVENOUS ONCE
Status: CANCELLED | OUTPATIENT
Start: 2023-04-26

## 2023-04-26 RX ORDER — FAMOTIDINE 10 MG/ML
20 INJECTION, SOLUTION INTRAVENOUS ONCE
Status: CANCELLED | OUTPATIENT
Start: 2023-04-26

## 2023-04-26 RX ORDER — POTASSIUM CHLORIDE 20 MEQ/1
20 TABLET, EXTENDED RELEASE ORAL DAILY
Qty: 30 TABLET | Refills: 1 | Status: SHIPPED | OUTPATIENT
Start: 2023-04-26

## 2023-04-26 RX ORDER — FAMOTIDINE 10 MG/ML
20 INJECTION, SOLUTION INTRAVENOUS ONCE
Status: COMPLETED | OUTPATIENT
Start: 2023-04-26 | End: 2023-04-26

## 2023-04-26 RX ADMIN — DIPHENHYDRAMINE HYDROCHLORIDE 25 MG: 50 INJECTION INTRAMUSCULAR; INTRAVENOUS at 12:01:00

## 2023-04-26 RX ADMIN — FAMOTIDINE 20 MG: 10 INJECTION, SOLUTION INTRAVENOUS at 11:51:00

## 2023-04-26 NOTE — PROGRESS NOTES
Pt here for C3D1 . Arrives Ambulating independently, accompanied by Self and Spouse       Oral medications included in this regimen:  no    Patient confirms comprehension of cancer treatment schedule:  yes    Pregnancy screening:  Not applicable    Modifications in dose or schedule:  No    Medications appearance and physical integrity checked by RN.  Chemotherapy IV pump settings verified by 2 RNs:  yes     Frequency of blood return and site check throughout administration: Prior to administration     Infusion/treatment outcome:  patient tolerated treatment without incident    Education Record    Learner:  Patient and Spouse  Barriers / Limitations:  None  Method:  Brief focused  Education / instructions given:    Outcome:  Shows understanding    Discharged Home, Ambulating independently, accompanied by:Self and Spouse    Patient/family verbalized understanding of future appointments: by printed AVS

## 2023-04-26 NOTE — PROGRESS NOTES
Education Record    Learner:  Patient/ family member    Disease / Diagnosis: breast ca  OTV D1C3 Taxol/ Trastuzumab    Barriers / Limitations:  None   Comments:    Method:  Discussion   Comments:    General Topics:  Plan of care reviewed   Comments:    Outcome:  Shows understanding   Comments:  Feeling well. Had one day of low energy. occ nausea. Increased acidity, taking probiotic, and takes TUMS prn  Constipation day of tx. Appetite is good, eating less than her normal.   Sudden swelling to right 3rd finger knuckle, with bruised looking appearance. Did improve, but still with some swelling.

## 2023-04-26 NOTE — PROGRESS NOTES
Met with patient and  in infusion to discuss next steps after chemo. Patient will finish chemo at the end of May. Prefer to have MRI done as soon as possible, scheduled for June 1st and will see Dr. Zuly Devi for surgical discussion on 6/6. Patient's  asked that surgery be scheduled as soon as possible, around June 15th. Their children are leaving the country and would prefer family assistance during the post-op period.

## 2023-05-02 ENCOUNTER — OFFICE VISIT (OUTPATIENT)
Dept: HEMATOLOGY/ONCOLOGY | Facility: HOSPITAL | Age: 78
End: 2023-05-02
Attending: SURGERY
Payer: COMMERCIAL

## 2023-05-02 VITALS
SYSTOLIC BLOOD PRESSURE: 152 MMHG | HEART RATE: 78 BPM | OXYGEN SATURATION: 97 % | HEIGHT: 63.19 IN | WEIGHT: 175 LBS | DIASTOLIC BLOOD PRESSURE: 78 MMHG | BODY MASS INDEX: 30.62 KG/M2 | RESPIRATION RATE: 16 BRPM | TEMPERATURE: 97 F

## 2023-05-02 DIAGNOSIS — C50.412 MALIGNANT NEOPLASM OF UPPER-OUTER QUADRANT OF LEFT BREAST IN FEMALE, ESTROGEN RECEPTOR NEGATIVE (HCC): Primary | ICD-10-CM

## 2023-05-02 DIAGNOSIS — T45.1X5D ADVERSE EFFECT OF CHEMOTHERAPY, SUBSEQUENT ENCOUNTER: ICD-10-CM

## 2023-05-02 DIAGNOSIS — E87.6 HYPOKALEMIA: ICD-10-CM

## 2023-05-02 DIAGNOSIS — Z17.1 MALIGNANT NEOPLASM OF UPPER-OUTER QUADRANT OF LEFT BREAST IN FEMALE, ESTROGEN RECEPTOR NEGATIVE (HCC): Primary | ICD-10-CM

## 2023-05-02 LAB
ALBUMIN SERPL-MCNC: 2.9 G/DL (ref 3.4–5)
ALBUMIN/GLOB SERPL: 0.8 {RATIO} (ref 1–2)
ALP LIVER SERPL-CCNC: 70 U/L
ALT SERPL-CCNC: 40 U/L
ANION GAP SERPL CALC-SCNC: 2 MMOL/L (ref 0–18)
AST SERPL-CCNC: 22 U/L (ref 15–37)
BASOPHILS # BLD AUTO: 0.04 X10(3) UL (ref 0–0.2)
BASOPHILS NFR BLD AUTO: 0.9 %
BILIRUB SERPL-MCNC: 0.4 MG/DL (ref 0.1–2)
BUN BLD-MCNC: 20 MG/DL (ref 7–18)
CALCIUM BLD-MCNC: 8.8 MG/DL (ref 8.5–10.1)
CHLORIDE SERPL-SCNC: 108 MMOL/L (ref 98–112)
CO2 SERPL-SCNC: 28 MMOL/L (ref 21–32)
CREAT BLD-MCNC: 0.84 MG/DL
EOSINOPHIL # BLD AUTO: 0.16 X10(3) UL (ref 0–0.7)
EOSINOPHIL NFR BLD AUTO: 3.5 %
ERYTHROCYTE [DISTWIDTH] IN BLOOD BY AUTOMATED COUNT: 16.3 %
GFR SERPLBLD BASED ON 1.73 SQ M-ARVRAT: 71 ML/MIN/1.73M2 (ref 60–?)
GLOBULIN PLAS-MCNC: 3.5 G/DL (ref 2.8–4.4)
GLUCOSE BLD-MCNC: 125 MG/DL (ref 70–99)
HCT VFR BLD AUTO: 30.7 %
HGB BLD-MCNC: 9.8 G/DL
IMM GRANULOCYTES # BLD AUTO: 0.13 X10(3) UL (ref 0–1)
IMM GRANULOCYTES NFR BLD: 2.9 %
LYMPHOCYTES # BLD AUTO: 1.01 X10(3) UL (ref 1–4)
LYMPHOCYTES NFR BLD AUTO: 22.3 %
MAGNESIUM SERPL-MCNC: 1.8 MG/DL (ref 1.6–2.6)
MCH RBC QN AUTO: 26.8 PG (ref 26–34)
MCHC RBC AUTO-ENTMCNC: 31.9 G/DL (ref 31–37)
MCV RBC AUTO: 84.1 FL
MONOCYTES # BLD AUTO: 0.3 X10(3) UL (ref 0.1–1)
MONOCYTES NFR BLD AUTO: 6.6 %
NEUTROPHILS # BLD AUTO: 2.88 X10 (3) UL (ref 1.5–7.7)
NEUTROPHILS # BLD AUTO: 2.88 X10(3) UL (ref 1.5–7.7)
NEUTROPHILS NFR BLD AUTO: 63.8 %
OSMOLALITY SERPL CALC.SUM OF ELEC: 290 MOSM/KG (ref 275–295)
PLATELET # BLD AUTO: 258 10(3)UL (ref 150–450)
POTASSIUM SERPL-SCNC: 3.8 MMOL/L (ref 3.5–5.1)
PROT SERPL-MCNC: 6.4 G/DL (ref 6.4–8.2)
RBC # BLD AUTO: 3.65 X10(6)UL
SODIUM SERPL-SCNC: 138 MMOL/L (ref 136–145)
WBC # BLD AUTO: 4.5 X10(3) UL (ref 4–11)

## 2023-05-02 PROCEDURE — 96413 CHEMO IV INFUSION 1 HR: CPT

## 2023-05-02 PROCEDURE — 85025 COMPLETE CBC W/AUTO DIFF WBC: CPT

## 2023-05-02 PROCEDURE — 99215 OFFICE O/P EST HI 40 MIN: CPT | Performed by: INTERNAL MEDICINE

## 2023-05-02 PROCEDURE — S0028 INJECTION, FAMOTIDINE, 20 MG: HCPCS | Performed by: INTERNAL MEDICINE

## 2023-05-02 PROCEDURE — 83735 ASSAY OF MAGNESIUM: CPT

## 2023-05-02 PROCEDURE — 96375 TX/PRO/DX INJ NEW DRUG ADDON: CPT

## 2023-05-02 PROCEDURE — 96417 CHEMO IV INFUS EACH ADDL SEQ: CPT

## 2023-05-02 PROCEDURE — 80053 COMPREHEN METABOLIC PANEL: CPT

## 2023-05-02 RX ORDER — DIPHENHYDRAMINE HYDROCHLORIDE 50 MG/ML
25 INJECTION INTRAMUSCULAR; INTRAVENOUS ONCE
Status: CANCELLED | OUTPATIENT
Start: 2023-05-02

## 2023-05-02 RX ORDER — DIPHENHYDRAMINE HYDROCHLORIDE 50 MG/ML
25 INJECTION INTRAMUSCULAR; INTRAVENOUS ONCE
Status: COMPLETED | OUTPATIENT
Start: 2023-05-02 | End: 2023-05-02

## 2023-05-02 RX ORDER — FAMOTIDINE 10 MG/ML
20 INJECTION, SOLUTION INTRAVENOUS ONCE
Status: COMPLETED | OUTPATIENT
Start: 2023-05-02 | End: 2023-05-02

## 2023-05-02 RX ORDER — FAMOTIDINE 10 MG/ML
20 INJECTION, SOLUTION INTRAVENOUS ONCE
Status: CANCELLED | OUTPATIENT
Start: 2023-05-02

## 2023-05-02 RX ADMIN — FAMOTIDINE 20 MG: 10 INJECTION, SOLUTION INTRAVENOUS at 11:18:00

## 2023-05-02 RX ADMIN — DIPHENHYDRAMINE HYDROCHLORIDE 25 MG: 50 INJECTION INTRAMUSCULAR; INTRAVENOUS at 11:14:00

## 2023-05-02 NOTE — PROGRESS NOTES
Pt here for C3D8 Trazimera, Taxol. Arrives Ambulating independently, accompanied by Spouse       Oral medications included in this regimen:  no    Patient confirms comprehension of cancer treatment schedule:  yes    Pregnancy screening:  Denies possibility of pregnancy    Modifications in dose or schedule:  No    Medications appearance and physical integrity checked by RN. Chemotherapy IV pump settings verified by 2 RNs:  yes     Frequency of blood return and site check throughout administration: Prior to administration and At completion of therapy     Infusion/treatment outcome:  patient tolerated treatment without incident    Education Record    Learner:  Patient and Spouse  Barriers / Limitations:  None  Method:  Brief focused and Discussion  Education / instructions given:  Plan of care, appointments.   Outcome:  Shows understanding    Discharged Home, Ambulating independently, accompanied by:Spouse    Patient/family verbalized understanding of future appointments: by [facility]

## 2023-05-09 ENCOUNTER — OFFICE VISIT (OUTPATIENT)
Dept: HEMATOLOGY/ONCOLOGY | Facility: HOSPITAL | Age: 78
End: 2023-05-09
Attending: SURGERY
Payer: COMMERCIAL

## 2023-05-09 ENCOUNTER — NURSE NAVIGATOR ENCOUNTER (OUTPATIENT)
Dept: HEMATOLOGY/ONCOLOGY | Facility: HOSPITAL | Age: 78
End: 2023-05-09

## 2023-05-09 VITALS
SYSTOLIC BLOOD PRESSURE: 121 MMHG | WEIGHT: 172 LBS | HEART RATE: 87 BPM | TEMPERATURE: 97 F | OXYGEN SATURATION: 98 % | BODY MASS INDEX: 30.1 KG/M2 | RESPIRATION RATE: 20 BRPM | HEIGHT: 63.19 IN | DIASTOLIC BLOOD PRESSURE: 74 MMHG

## 2023-05-09 DIAGNOSIS — R19.7 DIARRHEA, UNSPECIFIED TYPE: ICD-10-CM

## 2023-05-09 DIAGNOSIS — C50.412 MALIGNANT NEOPLASM OF UPPER-OUTER QUADRANT OF LEFT BREAST IN FEMALE, ESTROGEN RECEPTOR NEGATIVE (HCC): ICD-10-CM

## 2023-05-09 DIAGNOSIS — T45.1X5D ADVERSE EFFECT OF CHEMOTHERAPY, SUBSEQUENT ENCOUNTER: ICD-10-CM

## 2023-05-09 DIAGNOSIS — Z17.1 MALIGNANT NEOPLASM OF UPPER-OUTER QUADRANT OF LEFT BREAST IN FEMALE, ESTROGEN RECEPTOR NEGATIVE (HCC): Primary | ICD-10-CM

## 2023-05-09 DIAGNOSIS — Z17.1 MALIGNANT NEOPLASM OF UPPER-OUTER QUADRANT OF LEFT BREAST IN FEMALE, ESTROGEN RECEPTOR NEGATIVE (HCC): ICD-10-CM

## 2023-05-09 DIAGNOSIS — R11.0 CHEMOTHERAPY-INDUCED NAUSEA: ICD-10-CM

## 2023-05-09 DIAGNOSIS — C50.412 MALIGNANT NEOPLASM OF UPPER-OUTER QUADRANT OF LEFT BREAST IN FEMALE, ESTROGEN RECEPTOR NEGATIVE (HCC): Primary | ICD-10-CM

## 2023-05-09 DIAGNOSIS — R19.7 DIARRHEA, UNSPECIFIED TYPE: Primary | ICD-10-CM

## 2023-05-09 DIAGNOSIS — T45.1X5A CHEMOTHERAPY-INDUCED NAUSEA: ICD-10-CM

## 2023-05-09 LAB
ALBUMIN SERPL-MCNC: 3 G/DL (ref 3.4–5)
ALBUMIN/GLOB SERPL: 0.9 {RATIO} (ref 1–2)
ALP LIVER SERPL-CCNC: 70 U/L
ALT SERPL-CCNC: 33 U/L
ANION GAP SERPL CALC-SCNC: 3 MMOL/L (ref 0–18)
AST SERPL-CCNC: 23 U/L (ref 15–37)
BASOPHILS # BLD AUTO: 0.02 X10(3) UL (ref 0–0.2)
BASOPHILS NFR BLD AUTO: 0.5 %
BILIRUB SERPL-MCNC: 0.4 MG/DL (ref 0.1–2)
BUN BLD-MCNC: 21 MG/DL (ref 7–18)
CALCIUM BLD-MCNC: 9 MG/DL (ref 8.5–10.1)
CHLORIDE SERPL-SCNC: 105 MMOL/L (ref 98–112)
CO2 SERPL-SCNC: 28 MMOL/L (ref 21–32)
CREAT BLD-MCNC: 0.99 MG/DL
EOSINOPHIL # BLD AUTO: 0.13 X10(3) UL (ref 0–0.7)
EOSINOPHIL NFR BLD AUTO: 3.3 %
ERYTHROCYTE [DISTWIDTH] IN BLOOD BY AUTOMATED COUNT: 16.4 %
GFR SERPLBLD BASED ON 1.73 SQ M-ARVRAT: 58 ML/MIN/1.73M2 (ref 60–?)
GLOBULIN PLAS-MCNC: 3.5 G/DL (ref 2.8–4.4)
GLUCOSE BLD-MCNC: 123 MG/DL (ref 70–99)
HCT VFR BLD AUTO: 29.3 %
HGB BLD-MCNC: 9.7 G/DL
IMM GRANULOCYTES # BLD AUTO: 0.06 X10(3) UL (ref 0–1)
IMM GRANULOCYTES NFR BLD: 1.5 %
LYMPHOCYTES # BLD AUTO: 0.76 X10(3) UL (ref 1–4)
LYMPHOCYTES NFR BLD AUTO: 19.2 %
MCH RBC QN AUTO: 27.1 PG (ref 26–34)
MCHC RBC AUTO-ENTMCNC: 33.1 G/DL (ref 31–37)
MCV RBC AUTO: 81.8 FL
MONOCYTES # BLD AUTO: 0.3 X10(3) UL (ref 0.1–1)
MONOCYTES NFR BLD AUTO: 7.6 %
NEUTROPHILS # BLD AUTO: 2.69 X10 (3) UL (ref 1.5–7.7)
NEUTROPHILS # BLD AUTO: 2.69 X10(3) UL (ref 1.5–7.7)
NEUTROPHILS NFR BLD AUTO: 67.9 %
OSMOLALITY SERPL CALC.SUM OF ELEC: 286 MOSM/KG (ref 275–295)
PLATELET # BLD AUTO: 277 10(3)UL (ref 150–450)
POTASSIUM SERPL-SCNC: 3.4 MMOL/L (ref 3.5–5.1)
PROT SERPL-MCNC: 6.5 G/DL (ref 6.4–8.2)
RBC # BLD AUTO: 3.58 X10(6)UL
SODIUM SERPL-SCNC: 136 MMOL/L (ref 136–145)
WBC # BLD AUTO: 4 X10(3) UL (ref 4–11)

## 2023-05-09 PROCEDURE — 96375 TX/PRO/DX INJ NEW DRUG ADDON: CPT

## 2023-05-09 PROCEDURE — 96413 CHEMO IV INFUSION 1 HR: CPT

## 2023-05-09 PROCEDURE — 85025 COMPLETE CBC W/AUTO DIFF WBC: CPT

## 2023-05-09 PROCEDURE — 99215 OFFICE O/P EST HI 40 MIN: CPT | Performed by: INTERNAL MEDICINE

## 2023-05-09 PROCEDURE — S0028 INJECTION, FAMOTIDINE, 20 MG: HCPCS | Performed by: INTERNAL MEDICINE

## 2023-05-09 PROCEDURE — 80053 COMPREHEN METABOLIC PANEL: CPT

## 2023-05-09 PROCEDURE — 96417 CHEMO IV INFUS EACH ADDL SEQ: CPT

## 2023-05-09 RX ORDER — FAMOTIDINE 10 MG/ML
20 INJECTION, SOLUTION INTRAVENOUS ONCE
Status: COMPLETED | OUTPATIENT
Start: 2023-05-09 | End: 2023-05-09

## 2023-05-09 RX ORDER — DIPHENHYDRAMINE HYDROCHLORIDE 50 MG/ML
25 INJECTION INTRAMUSCULAR; INTRAVENOUS ONCE
Status: CANCELLED | OUTPATIENT
Start: 2023-05-09

## 2023-05-09 RX ORDER — DIPHENHYDRAMINE HYDROCHLORIDE 50 MG/ML
25 INJECTION INTRAMUSCULAR; INTRAVENOUS ONCE
Status: COMPLETED | OUTPATIENT
Start: 2023-05-09 | End: 2023-05-09

## 2023-05-09 RX ORDER — FAMOTIDINE 10 MG/ML
20 INJECTION, SOLUTION INTRAVENOUS ONCE
Status: CANCELLED | OUTPATIENT
Start: 2023-05-09

## 2023-05-09 RX ADMIN — DIPHENHYDRAMINE HYDROCHLORIDE 25 MG: 50 INJECTION INTRAMUSCULAR; INTRAVENOUS at 11:22:00

## 2023-05-09 RX ADMIN — FAMOTIDINE 20 MG: 10 INJECTION, SOLUTION INTRAVENOUS at 11:22:00

## 2023-05-09 NOTE — PROGRESS NOTES
Pt here for C3D15 trazimera/taxol. Arrives Ambulating independently, accompanied by Spouse       Oral medications included in this regimen:  no    Patient confirms comprehension of cancer treatment schedule:  yes    Pregnancy screening:  Denies possibility of pregnancy    Modifications in dose or schedule:  No    Medications appearance and physical integrity checked by RN.  Chemotherapy IV pump settings verified by 2 RNs:  yes     Frequency of blood return and site check throughout administration: Prior to administration     Infusion/treatment outcome:  patient tolerated treatment without incident    Education Record    Learner:  Patient and Spouse  Barriers / Limitations:  None  Method:  Brief focused and Discussion  Education / instructions given:  Plan of care, appts, increasing potassium rich foods  Outcome:  Shows understanding    Discharged Home, Ambulating independently, accompanied by:Self and Spouse    Patient/family verbalized understanding of future appointments: by [facility]

## 2023-05-09 NOTE — PROGRESS NOTES
Education Record    Learner:  Patient/ spouse    Disease / Diagnosis:breast ca  OTV C3 C15 Taxol/ herceptin    Barriers / Limitations:  None   Comments:    Method:  Discussion   Comments:    General Topics:  Plan of care reviewed   Comments:    Outcome:  Shows understanding   Comments:    Pt reports that last Friday started later in the day, frequent BM 6-7 over night, with abd  Cramping, started with some diarrhea, then  eventually was mucusy and freq. Small amounts. Maia Pedraza MD on Saturday,  Took peptobismol one dose on Saturday. Felt very weak Saturday. Ate liquid diet as advised. No mucusy stool on Sunday, and felt a bit better with more energy   Took a dose of pepto Monday, and energy level was better Monday. Had BM today, no mucus. Reviewed BRAT diet. And to eat small more frequent meals. Other questions addressed, emotional support offered. Post tx MRI is scheduled.

## 2023-05-09 NOTE — PROGRESS NOTES
Called patient's , Tresa Flores, back in regards to her VM, that he would like to schedule genetics blood draw for her labs with chemotherapy today with Dr. Rhys Piña since he received an approval letter from his insurance for genetics. I stated to him that I spoke to Ana Lilia Quiles and she will have the kit ready for the patient today. He thanked me for the phone call back and assistance. Pt was provided with the breast nurse navigators contact information and was encouraged to phone with any other questions or concerns.

## 2023-05-16 ENCOUNTER — OFFICE VISIT (OUTPATIENT)
Dept: HEMATOLOGY/ONCOLOGY | Facility: HOSPITAL | Age: 78
End: 2023-05-16
Attending: SURGERY
Payer: COMMERCIAL

## 2023-05-16 VITALS
SYSTOLIC BLOOD PRESSURE: 148 MMHG | RESPIRATION RATE: 16 BRPM | OXYGEN SATURATION: 98 % | WEIGHT: 175.81 LBS | DIASTOLIC BLOOD PRESSURE: 94 MMHG | BODY MASS INDEX: 31 KG/M2 | TEMPERATURE: 100 F

## 2023-05-16 DIAGNOSIS — E87.6 HYPOKALEMIA: ICD-10-CM

## 2023-05-16 DIAGNOSIS — C50.412 MALIGNANT NEOPLASM OF UPPER-OUTER QUADRANT OF LEFT BREAST IN FEMALE, ESTROGEN RECEPTOR NEGATIVE (HCC): Primary | ICD-10-CM

## 2023-05-16 DIAGNOSIS — Z17.1 MALIGNANT NEOPLASM OF UPPER-OUTER QUADRANT OF LEFT BREAST IN FEMALE, ESTROGEN RECEPTOR NEGATIVE (HCC): Primary | ICD-10-CM

## 2023-05-16 LAB
ALBUMIN SERPL-MCNC: 2.9 G/DL (ref 3.4–5)
ALBUMIN/GLOB SERPL: 0.8 {RATIO} (ref 1–2)
ALP LIVER SERPL-CCNC: 67 U/L
ALT SERPL-CCNC: 33 U/L
ANION GAP SERPL CALC-SCNC: 5 MMOL/L (ref 0–18)
AST SERPL-CCNC: 25 U/L (ref 15–37)
BASOPHILS # BLD AUTO: 0.03 X10(3) UL (ref 0–0.2)
BASOPHILS NFR BLD AUTO: 0.8 %
BILIRUB SERPL-MCNC: 0.3 MG/DL (ref 0.1–2)
BUN BLD-MCNC: 18 MG/DL (ref 7–18)
CALCIUM BLD-MCNC: 9 MG/DL (ref 8.5–10.1)
CHLORIDE SERPL-SCNC: 107 MMOL/L (ref 98–112)
CO2 SERPL-SCNC: 27 MMOL/L (ref 21–32)
CREAT BLD-MCNC: 0.86 MG/DL
EOSINOPHIL # BLD AUTO: 0.15 X10(3) UL (ref 0–0.7)
EOSINOPHIL NFR BLD AUTO: 3.9 %
ERYTHROCYTE [DISTWIDTH] IN BLOOD BY AUTOMATED COUNT: 17 %
GFR SERPLBLD BASED ON 1.73 SQ M-ARVRAT: 69 ML/MIN/1.73M2 (ref 60–?)
GLOBULIN PLAS-MCNC: 3.6 G/DL (ref 2.8–4.4)
GLUCOSE BLD-MCNC: 124 MG/DL (ref 70–99)
HCT VFR BLD AUTO: 29.3 %
HGB BLD-MCNC: 9.4 G/DL
IMM GRANULOCYTES # BLD AUTO: 0.11 X10(3) UL (ref 0–1)
IMM GRANULOCYTES NFR BLD: 2.9 %
LYMPHOCYTES # BLD AUTO: 0.92 X10(3) UL (ref 1–4)
LYMPHOCYTES NFR BLD AUTO: 24 %
MCH RBC QN AUTO: 26.9 PG (ref 26–34)
MCHC RBC AUTO-ENTMCNC: 32.1 G/DL (ref 31–37)
MCV RBC AUTO: 84 FL
MONOCYTES # BLD AUTO: 0.39 X10(3) UL (ref 0.1–1)
MONOCYTES NFR BLD AUTO: 10.2 %
NEUTROPHILS # BLD AUTO: 2.24 X10 (3) UL (ref 1.5–7.7)
NEUTROPHILS # BLD AUTO: 2.24 X10(3) UL (ref 1.5–7.7)
NEUTROPHILS NFR BLD AUTO: 58.2 %
OSMOLALITY SERPL CALC.SUM OF ELEC: 291 MOSM/KG (ref 275–295)
PLATELET # BLD AUTO: 292 10(3)UL (ref 150–450)
POTASSIUM SERPL-SCNC: 3.3 MMOL/L (ref 3.5–5.1)
PROT SERPL-MCNC: 6.5 G/DL (ref 6.4–8.2)
RBC # BLD AUTO: 3.49 X10(6)UL
SODIUM SERPL-SCNC: 139 MMOL/L (ref 136–145)
WBC # BLD AUTO: 3.8 X10(3) UL (ref 4–11)

## 2023-05-16 PROCEDURE — 99215 OFFICE O/P EST HI 40 MIN: CPT | Performed by: NURSE PRACTITIONER

## 2023-05-16 PROCEDURE — 96413 CHEMO IV INFUSION 1 HR: CPT

## 2023-05-16 PROCEDURE — 96375 TX/PRO/DX INJ NEW DRUG ADDON: CPT

## 2023-05-16 PROCEDURE — 96417 CHEMO IV INFUS EACH ADDL SEQ: CPT

## 2023-05-16 PROCEDURE — 80053 COMPREHEN METABOLIC PANEL: CPT

## 2023-05-16 PROCEDURE — S0028 INJECTION, FAMOTIDINE, 20 MG: HCPCS | Performed by: NURSE PRACTITIONER

## 2023-05-16 PROCEDURE — 85025 COMPLETE CBC W/AUTO DIFF WBC: CPT

## 2023-05-16 RX ORDER — FAMOTIDINE 10 MG/ML
20 INJECTION, SOLUTION INTRAVENOUS ONCE
Status: COMPLETED | OUTPATIENT
Start: 2023-05-16 | End: 2023-05-16

## 2023-05-16 RX ORDER — FAMOTIDINE 10 MG/ML
20 INJECTION, SOLUTION INTRAVENOUS ONCE
Status: CANCELLED | OUTPATIENT
Start: 2023-05-16

## 2023-05-16 RX ORDER — DIPHENHYDRAMINE HYDROCHLORIDE 50 MG/ML
25 INJECTION INTRAMUSCULAR; INTRAVENOUS ONCE
Status: COMPLETED | OUTPATIENT
Start: 2023-05-16 | End: 2023-05-16

## 2023-05-16 RX ORDER — DIPHENHYDRAMINE HYDROCHLORIDE 50 MG/ML
25 INJECTION INTRAMUSCULAR; INTRAVENOUS ONCE
Status: CANCELLED | OUTPATIENT
Start: 2023-05-16

## 2023-05-16 RX ADMIN — FAMOTIDINE 20 MG: 10 INJECTION, SOLUTION INTRAVENOUS at 11:41:00

## 2023-05-16 RX ADMIN — DIPHENHYDRAMINE HYDROCHLORIDE 25 MG: 50 INJECTION INTRAMUSCULAR; INTRAVENOUS at 11:37:00

## 2023-05-16 NOTE — PROGRESS NOTES
Pt here for C4D1. Arrives Ambulating independently, accompanied by Spouse       Patient confirms comprehension of cancer treatment schedule:  yes    Pregnancy screening:  Not applicable    Modifications in dose or schedule:  No    Medications appearance and physical integrity checked by RN.  Chemotherapy IV pump settings verified by 2 RNs:  yes     Frequency of blood return and site check throughout administration: Prior to administration and At completion of therapy     Infusion/treatment outcome:  patient tolerated treatment without incident    Education Record    Learner:  Patient and Spouse  Barriers / Limitations:  None  Method:  Discussion and Reinforcement  Education / instructions given:  Plan of care reviewed  Outcome:  Shows understanding    Discharged Home, Ambulating independently, accompanied by:Self and Spouse    Patient/family verbalized understanding of future appointments: by [facility]

## 2023-05-16 NOTE — PROGRESS NOTES
Patient presents with: Follow - Up: APN assessment prior to treatment  Chemotherapy    Pt is here for treatment - C4 D1 taxol/trastuzumab is expected. She reports good/bad days. Appetite is ok; has some taste issues, drinking ok. Denies N,V. Has acid reflux, stomach pains, with cyclical constipation and diarrhea after treatment; some improvement last cycle with pepto.     Education Record    Learner:  Patient and Spouse    Disease / Diagnosis: breast cancer    Barriers / Limitations:  None   Comments:    Method:  Brief focused   Comments:    General Topics:  Diet, Medication, Pain, Side effects and symptom management and Plan of care reviewed   Comments:    Outcome:  Shows understanding   Comments:

## 2023-05-23 ENCOUNTER — OFFICE VISIT (OUTPATIENT)
Dept: HEMATOLOGY/ONCOLOGY | Facility: HOSPITAL | Age: 78
End: 2023-05-23
Attending: SURGERY
Payer: COMMERCIAL

## 2023-05-23 ENCOUNTER — GENETICS ENCOUNTER (OUTPATIENT)
Dept: HEMATOLOGY/ONCOLOGY | Facility: HOSPITAL | Age: 78
End: 2023-05-23

## 2023-05-23 ENCOUNTER — NURSE NAVIGATOR ENCOUNTER (OUTPATIENT)
Dept: HEMATOLOGY/ONCOLOGY | Facility: HOSPITAL | Age: 78
End: 2023-05-23

## 2023-05-23 VITALS
OXYGEN SATURATION: 96 % | TEMPERATURE: 97 F | BODY MASS INDEX: 29.96 KG/M2 | WEIGHT: 171.19 LBS | HEIGHT: 63.19 IN | DIASTOLIC BLOOD PRESSURE: 77 MMHG | HEART RATE: 95 BPM | RESPIRATION RATE: 16 BRPM | SYSTOLIC BLOOD PRESSURE: 148 MMHG

## 2023-05-23 DIAGNOSIS — C50.412 MALIGNANT NEOPLASM OF UPPER-OUTER QUADRANT OF LEFT BREAST IN FEMALE, ESTROGEN RECEPTOR NEGATIVE (HCC): Primary | ICD-10-CM

## 2023-05-23 DIAGNOSIS — E87.6 HYPOKALEMIA: Primary | ICD-10-CM

## 2023-05-23 DIAGNOSIS — Z17.1 MALIGNANT NEOPLASM OF UPPER-OUTER QUADRANT OF LEFT BREAST IN FEMALE, ESTROGEN RECEPTOR NEGATIVE (HCC): ICD-10-CM

## 2023-05-23 DIAGNOSIS — R19.7 DIARRHEA, UNSPECIFIED TYPE: ICD-10-CM

## 2023-05-23 DIAGNOSIS — R11.0 CHEMOTHERAPY-INDUCED NAUSEA: ICD-10-CM

## 2023-05-23 DIAGNOSIS — Z17.1 MALIGNANT NEOPLASM OF UPPER-OUTER QUADRANT OF LEFT BREAST IN FEMALE, ESTROGEN RECEPTOR NEGATIVE (HCC): Primary | ICD-10-CM

## 2023-05-23 DIAGNOSIS — C50.412 MALIGNANT NEOPLASM OF UPPER-OUTER QUADRANT OF LEFT BREAST IN FEMALE, ESTROGEN RECEPTOR NEGATIVE (HCC): ICD-10-CM

## 2023-05-23 DIAGNOSIS — T45.1X5D ADVERSE EFFECT OF CHEMOTHERAPY, SUBSEQUENT ENCOUNTER: ICD-10-CM

## 2023-05-23 DIAGNOSIS — T45.1X5A CHEMOTHERAPY-INDUCED NAUSEA: ICD-10-CM

## 2023-05-23 DIAGNOSIS — E87.6 HYPOKALEMIA: ICD-10-CM

## 2023-05-23 LAB
ANION GAP SERPL CALC-SCNC: 6 MMOL/L (ref 0–18)
BASOPHILS # BLD AUTO: 0.02 X10(3) UL (ref 0–0.2)
BASOPHILS NFR BLD AUTO: 0.4 %
BUN BLD-MCNC: 22 MG/DL (ref 7–18)
CALCIUM BLD-MCNC: 8.9 MG/DL (ref 8.5–10.1)
CHLORIDE SERPL-SCNC: 108 MMOL/L (ref 98–112)
CO2 SERPL-SCNC: 23 MMOL/L (ref 21–32)
CREAT BLD-MCNC: 0.71 MG/DL
EOSINOPHIL # BLD AUTO: 0.13 X10(3) UL (ref 0–0.7)
EOSINOPHIL NFR BLD AUTO: 2.9 %
ERYTHROCYTE [DISTWIDTH] IN BLOOD BY AUTOMATED COUNT: 17 %
FASTING STATUS PATIENT QL REPORTED: NO
GFR SERPLBLD BASED ON 1.73 SQ M-ARVRAT: 87 ML/MIN/1.73M2 (ref 60–?)
GLUCOSE BLD-MCNC: 156 MG/DL (ref 70–99)
HCT VFR BLD AUTO: 29.3 %
HGB BLD-MCNC: 9.7 G/DL
IMM GRANULOCYTES # BLD AUTO: 0.17 X10(3) UL (ref 0–1)
IMM GRANULOCYTES NFR BLD: 3.8 %
LYMPHOCYTES # BLD AUTO: 0.94 X10(3) UL (ref 1–4)
LYMPHOCYTES NFR BLD AUTO: 20.8 %
MCH RBC QN AUTO: 26.9 PG (ref 26–34)
MCHC RBC AUTO-ENTMCNC: 33.1 G/DL (ref 31–37)
MCV RBC AUTO: 81.2 FL
MONOCYTES # BLD AUTO: 0.47 X10(3) UL (ref 0.1–1)
MONOCYTES NFR BLD AUTO: 10.4 %
NEUTROPHILS # BLD AUTO: 2.79 X10 (3) UL (ref 1.5–7.7)
NEUTROPHILS # BLD AUTO: 2.79 X10(3) UL (ref 1.5–7.7)
NEUTROPHILS NFR BLD AUTO: 61.7 %
OSMOLALITY SERPL CALC.SUM OF ELEC: 291 MOSM/KG (ref 275–295)
PLATELET # BLD AUTO: 332 10(3)UL (ref 150–450)
POTASSIUM SERPL-SCNC: 3.8 MMOL/L (ref 3.5–5.1)
RBC # BLD AUTO: 3.61 X10(6)UL
SODIUM SERPL-SCNC: 137 MMOL/L (ref 136–145)
WBC # BLD AUTO: 4.5 X10(3) UL (ref 4–11)

## 2023-05-23 PROCEDURE — 96417 CHEMO IV INFUS EACH ADDL SEQ: CPT

## 2023-05-23 PROCEDURE — S0028 INJECTION, FAMOTIDINE, 20 MG: HCPCS | Performed by: INTERNAL MEDICINE

## 2023-05-23 PROCEDURE — 96413 CHEMO IV INFUSION 1 HR: CPT

## 2023-05-23 PROCEDURE — 99215 OFFICE O/P EST HI 40 MIN: CPT | Performed by: INTERNAL MEDICINE

## 2023-05-23 PROCEDURE — 80048 BASIC METABOLIC PNL TOTAL CA: CPT

## 2023-05-23 PROCEDURE — 85025 COMPLETE CBC W/AUTO DIFF WBC: CPT

## 2023-05-23 PROCEDURE — 96375 TX/PRO/DX INJ NEW DRUG ADDON: CPT

## 2023-05-23 RX ORDER — DIPHENHYDRAMINE HYDROCHLORIDE 50 MG/ML
25 INJECTION INTRAMUSCULAR; INTRAVENOUS ONCE
Status: CANCELLED | OUTPATIENT
Start: 2023-05-23

## 2023-05-23 RX ORDER — FAMOTIDINE 10 MG/ML
20 INJECTION, SOLUTION INTRAVENOUS ONCE
Status: COMPLETED | OUTPATIENT
Start: 2023-05-23 | End: 2023-05-23

## 2023-05-23 RX ORDER — DIPHENHYDRAMINE HYDROCHLORIDE 50 MG/ML
25 INJECTION INTRAMUSCULAR; INTRAVENOUS ONCE
Status: COMPLETED | OUTPATIENT
Start: 2023-05-23 | End: 2023-05-23

## 2023-05-23 RX ORDER — FAMOTIDINE 10 MG/ML
20 INJECTION, SOLUTION INTRAVENOUS ONCE
Status: CANCELLED | OUTPATIENT
Start: 2023-05-23

## 2023-05-23 RX ADMIN — DIPHENHYDRAMINE HYDROCHLORIDE 25 MG: 50 INJECTION INTRAMUSCULAR; INTRAVENOUS at 11:26:00

## 2023-05-23 RX ADMIN — FAMOTIDINE 20 MG: 10 INJECTION, SOLUTION INTRAVENOUS at 11:25:00

## 2023-05-23 NOTE — PROGRESS NOTES
Pt here for C4D8 taxol/trazimera. Arrives Ambulating independently, accompanied by Spouse       Oral medications included in this regimen:  no    Patient confirms comprehension of cancer treatment schedule:  yes    Pregnancy screening:  Denies possibility of pregnancy    Modifications in dose or schedule:  No    Medications appearance and physical integrity checked by RN. Chemotherapy IV pump settings verified by 2 RNs:  yes     Frequency of blood return and site check throughout administration: Prior to administration     Infusion/treatment outcome:  patient tolerated treatment without incident    Education Record    Learner:  Patient and Spouse  Barriers / Limitations:  None  Method:  Discussion  Education / instructions given:  yes  Outcome:  Shows understanding    Discharged Home, Ambulating independently, accompanied by:Spouse    Patient/family verbalized understanding of future appointments: by [facility]     Here for treatment. Feeling tired, but no other complaints. Labs good for treatment. MD wanted a BMP drawn to check on K--will contact pt if they need changes in PO medication at home. Tolerated infusion.  Already has next week's appt scheduled

## 2023-05-23 NOTE — PROGRESS NOTES
Referring Provider:                    Jeannette Narvaez MD     Additional Provider(s):              Lennox Sames, MD Reather Murdoch, MD             Reason for Referral:  Evelia Betancourt had genetic testing performed on 05/09/23 because of a personal diagnosis of breast cancer. Genetic Testing Result:  No known pathogenic variants were found in the following 19 genes: KATELYNN, BRCA1, BRCA2, BRIP1, CDH1, CHEK2, MLH1, MSH2 (including EPCAM rearrangement testing), MSH6, NBN, NF1, PALB2, PMS2, PTEN, RAD51C, RAD51D, STK11, and TP53. Please refer to the report from [facility] for additional testing information. These results were discussed with Ms. Lea , Chloe Vazquez, by phone on 05/23/23. Summary and Plan:  These results indicate that it is unlikely that Ms. Jan Moreno has a pathogenic variant in any of the genes listed above. The limitations of the testing include the chance that a pathogenic variant in a gene other than those included in this analysis might be the cause of cancer in Ms. Jan Moreno or her relatives. I encourage Ms. Jan Moreno to contact me on an annual basis to see if there have been any updates in genetic testing that would apply to her or to inform me if there are any changes to the family history. In the meantime, Ms. Jan Moreno and her relatives should speak with their physicians to discuss recommended medical management according to their personal and family history.     Cc:  Evelia Betancourt

## 2023-05-23 NOTE — PROGRESS NOTES
Met with patient and her  during her chemotherapy infusion and discussed rescheduling her breast MRI due to her chemotherapy deferment. Rescheduled patient after June 1st, as per patient request, and scheduled breast MRI for June 6, 2023 at [facility]. Rescheduled patient with Dr. Chelo Brooks for June 13, 2023 at 56 at the [facility]. They thanked me for the assistance and was provided with the breast nurse navigators contact information and was encouraged to phone with any other questions or concerns.

## 2023-05-23 NOTE — PROGRESS NOTES
Education Record    Learner:  Patient/ spouse    Disease / Diagnosis:breast cancer  OTV D8C4 Taxol/ Herceptin    Barriers / Limitations:  None   Comments:    Method:  Discussion   Comments:    General Topics:  Plan of care reviewed   Comments:    Outcome:  Shows understanding   Comments:  Post treatment MRI scheduled, surgical planning appt scheduled after MRI. Pt reports its been a rough week. Increased fatigue, feels hungry but early satiety. Some diarrhea, takes pepto once per day. 2 pound weight loss. Encouraged to try increase snacks to maintain weight. Questions addressed regarding timing of infusion, and herceptin. Questions addressed regarding what to expect with side effects. Has the chills occ, has not checked her temp. Emotional support offered.

## 2023-05-30 ENCOUNTER — OFFICE VISIT (OUTPATIENT)
Dept: HEMATOLOGY/ONCOLOGY | Facility: HOSPITAL | Age: 78
End: 2023-05-30
Attending: SURGERY
Payer: COMMERCIAL

## 2023-05-30 VITALS
WEIGHT: 171.81 LBS | HEIGHT: 63.19 IN | RESPIRATION RATE: 18 BRPM | OXYGEN SATURATION: 96 % | BODY MASS INDEX: 30.06 KG/M2 | HEART RATE: 95 BPM | SYSTOLIC BLOOD PRESSURE: 129 MMHG | DIASTOLIC BLOOD PRESSURE: 76 MMHG | TEMPERATURE: 98 F

## 2023-05-30 DIAGNOSIS — Z17.1 MALIGNANT NEOPLASM OF UPPER-OUTER QUADRANT OF LEFT BREAST IN FEMALE, ESTROGEN RECEPTOR NEGATIVE (HCC): Primary | ICD-10-CM

## 2023-05-30 DIAGNOSIS — C50.412 MALIGNANT NEOPLASM OF UPPER-OUTER QUADRANT OF LEFT BREAST IN FEMALE, ESTROGEN RECEPTOR NEGATIVE (HCC): Primary | ICD-10-CM

## 2023-05-30 DIAGNOSIS — Z51.11 ENCOUNTER FOR CHEMOTHERAPY MANAGEMENT: ICD-10-CM

## 2023-05-30 DIAGNOSIS — R19.7 DIARRHEA, UNSPECIFIED TYPE: ICD-10-CM

## 2023-05-30 LAB
ALBUMIN SERPL-MCNC: 2.8 G/DL (ref 3.4–5)
ALBUMIN/GLOB SERPL: 0.8 {RATIO} (ref 1–2)
ALP LIVER SERPL-CCNC: 61 U/L
ALT SERPL-CCNC: 34 U/L
ANION GAP SERPL CALC-SCNC: 7 MMOL/L (ref 0–18)
AST SERPL-CCNC: 20 U/L (ref 15–37)
BASOPHILS # BLD AUTO: 0.03 X10(3) UL (ref 0–0.2)
BASOPHILS NFR BLD AUTO: 0.6 %
BILIRUB SERPL-MCNC: 0.4 MG/DL (ref 0.1–2)
BUN BLD-MCNC: 20 MG/DL (ref 7–18)
CALCIUM BLD-MCNC: 9.1 MG/DL (ref 8.5–10.1)
CHLORIDE SERPL-SCNC: 105 MMOL/L (ref 98–112)
CO2 SERPL-SCNC: 26 MMOL/L (ref 21–32)
CREAT BLD-MCNC: 0.89 MG/DL
EOSINOPHIL # BLD AUTO: 0.1 X10(3) UL (ref 0–0.7)
EOSINOPHIL NFR BLD AUTO: 2.1 %
ERYTHROCYTE [DISTWIDTH] IN BLOOD BY AUTOMATED COUNT: 17.2 %
GFR SERPLBLD BASED ON 1.73 SQ M-ARVRAT: 66 ML/MIN/1.73M2 (ref 60–?)
GLOBULIN PLAS-MCNC: 3.7 G/DL (ref 2.8–4.4)
GLUCOSE BLD-MCNC: 120 MG/DL (ref 70–99)
HCT VFR BLD AUTO: 28.1 %
HGB BLD-MCNC: 9.3 G/DL
IMM GRANULOCYTES # BLD AUTO: 0.15 X10(3) UL (ref 0–1)
IMM GRANULOCYTES NFR BLD: 3.2 %
LYMPHOCYTES # BLD AUTO: 1.04 X10(3) UL (ref 1–4)
LYMPHOCYTES NFR BLD AUTO: 22.3 %
MCH RBC QN AUTO: 26.6 PG (ref 26–34)
MCHC RBC AUTO-ENTMCNC: 33.1 G/DL (ref 31–37)
MCV RBC AUTO: 80.3 FL
MONOCYTES # BLD AUTO: 0.41 X10(3) UL (ref 0.1–1)
MONOCYTES NFR BLD AUTO: 8.8 %
NEUTROPHILS # BLD AUTO: 2.94 X10 (3) UL (ref 1.5–7.7)
NEUTROPHILS # BLD AUTO: 2.94 X10(3) UL (ref 1.5–7.7)
NEUTROPHILS NFR BLD AUTO: 63 %
OSMOLALITY SERPL CALC.SUM OF ELEC: 290 MOSM/KG (ref 275–295)
PLATELET # BLD AUTO: 313 10(3)UL (ref 150–450)
POTASSIUM SERPL-SCNC: 3.7 MMOL/L (ref 3.5–5.1)
PROT SERPL-MCNC: 6.5 G/DL (ref 6.4–8.2)
RBC # BLD AUTO: 3.5 X10(6)UL
SODIUM SERPL-SCNC: 138 MMOL/L (ref 136–145)
WBC # BLD AUTO: 4.7 X10(3) UL (ref 4–11)

## 2023-05-30 PROCEDURE — 96417 CHEMO IV INFUS EACH ADDL SEQ: CPT

## 2023-05-30 PROCEDURE — S0028 INJECTION, FAMOTIDINE, 20 MG: HCPCS | Performed by: NURSE PRACTITIONER

## 2023-05-30 PROCEDURE — 96375 TX/PRO/DX INJ NEW DRUG ADDON: CPT

## 2023-05-30 PROCEDURE — 99215 OFFICE O/P EST HI 40 MIN: CPT | Performed by: NURSE PRACTITIONER

## 2023-05-30 PROCEDURE — 85025 COMPLETE CBC W/AUTO DIFF WBC: CPT

## 2023-05-30 PROCEDURE — 96413 CHEMO IV INFUSION 1 HR: CPT

## 2023-05-30 PROCEDURE — 80053 COMPREHEN METABOLIC PANEL: CPT

## 2023-05-30 RX ORDER — DIPHENHYDRAMINE HYDROCHLORIDE 50 MG/ML
25 INJECTION INTRAMUSCULAR; INTRAVENOUS ONCE
Status: CANCELLED | OUTPATIENT
Start: 2023-05-30

## 2023-05-30 RX ORDER — FAMOTIDINE 10 MG/ML
20 INJECTION, SOLUTION INTRAVENOUS ONCE
Status: COMPLETED | OUTPATIENT
Start: 2023-05-30 | End: 2023-05-30

## 2023-05-30 RX ORDER — FAMOTIDINE 10 MG/ML
20 INJECTION, SOLUTION INTRAVENOUS ONCE
Status: CANCELLED | OUTPATIENT
Start: 2023-05-30

## 2023-05-30 RX ORDER — DIPHENHYDRAMINE HYDROCHLORIDE 50 MG/ML
25 INJECTION INTRAMUSCULAR; INTRAVENOUS ONCE
Status: COMPLETED | OUTPATIENT
Start: 2023-05-30 | End: 2023-05-30

## 2023-05-30 RX ORDER — DIAZEPAM 10 MG/1
10 TABLET ORAL ONCE
Qty: 1 TABLET | Refills: 0 | Status: SHIPPED | OUTPATIENT
Start: 2023-05-30 | End: 2023-05-30

## 2023-05-30 RX ADMIN — DIPHENHYDRAMINE HYDROCHLORIDE 25 MG: 50 INJECTION INTRAMUSCULAR; INTRAVENOUS at 11:29:00

## 2023-05-30 RX ADMIN — FAMOTIDINE 20 MG: 10 INJECTION, SOLUTION INTRAVENOUS at 11:31:00

## 2023-05-30 NOTE — PROGRESS NOTES
Pt here for C4D15 Taxol/Trazimera. Arrives Ambulating independently, accompanied by Spouse       Oral medications included in this regimen:  no    Patient confirms comprehension of cancer treatment schedule:  yes    Pregnancy screening:  Denies possibility of pregnancy    Modifications in dose or schedule:  No    Medications appearance and physical integrity checked by RN. Chemotherapy IV pump settings verified by 2 RNs:  yes     Frequency of blood return and site check throughout administration: Prior to administration and At completion of therapy     Infusion/treatment outcome:  patient tolerated treatment without incident    Education Record    Learner:  Patient  Barriers / Limitations:  None  Method:  Brief focused and Reinforcement  Education / instructions given:  yes  Outcome:  Shows understanding    Discharged Home, Ambulating independently, accompanied by:Spouse    Patient/family verbalized understanding of future appointments: by [employer]     Patient tolerated chemotherapy and discharged in stable condition.

## 2023-05-30 NOTE — PROGRESS NOTES
Patient presents with: Follow - Up: APN assessment prior to treatment  Chemotherapy    Pt is here for treatment - C4 D15 taxol/herceptin is expected. She reports more fatigue than previous treatments. Appetite is good; eating ok but feels full earlier. Denies N,V. Cyclical constipation and diarrhea that she manages with pepto. Is sleeping more; some abdominal pain that can relieve with BM.     Education Record    Learner:  Patient and Spouse    Disease / Diagnosis: breast cancer    Barriers / Limitations:  None   Comments:    Method:  Brief focused   Comments:    General Topics:  Diet, Medication, Pain, Side effects and symptom management and Plan of care reviewed   Comments:    Outcome:  Shows understanding   Comments:

## 2023-06-06 ENCOUNTER — HOSPITAL ENCOUNTER (OUTPATIENT)
Dept: MRI IMAGING | Facility: HOSPITAL | Age: 78
Discharge: HOME | End: 2023-06-06
Attending: SURGERY
Payer: COMMERCIAL

## 2023-06-06 ENCOUNTER — APPOINTMENT (OUTPATIENT)
Dept: HEMATOLOGY/ONCOLOGY | Facility: HOSPITAL | Age: 78
End: 2023-06-06
Attending: SURGERY
Payer: COMMERCIAL

## 2023-06-06 DIAGNOSIS — Z17.1 MALIGNANT NEOPLASM OF UPPER-OUTER QUADRANT OF LEFT BREAST IN FEMALE, ESTROGEN RECEPTOR NEGATIVE (HCC): ICD-10-CM

## 2023-06-06 DIAGNOSIS — C50.412 MALIGNANT NEOPLASM OF UPPER-OUTER QUADRANT OF LEFT BREAST IN FEMALE, ESTROGEN RECEPTOR NEGATIVE (HCC): ICD-10-CM

## 2023-06-06 PROCEDURE — 77049 MRI BREAST C-+ W/CAD BI: CPT | Performed by: SURGERY

## 2023-06-06 PROCEDURE — A9575 INJ GADOTERATE MEGLUMI 0.1ML: HCPCS | Performed by: SURGERY

## 2023-06-06 RX ORDER — GADOTERATE MEGLUMINE 376.9 MG/ML
20 INJECTION INTRAVENOUS
Status: COMPLETED | OUTPATIENT
Start: 2023-06-06 | End: 2023-06-06

## 2023-06-06 RX ADMIN — GADOTERATE MEGLUMINE 16 ML: 376.9 INJECTION INTRAVENOUS at 16:32:00

## 2023-06-07 ENCOUNTER — OFFICE VISIT (OUTPATIENT)
Dept: HEMATOLOGY/ONCOLOGY | Facility: HOSPITAL | Age: 78
End: 2023-06-07
Attending: SURGERY
Payer: COMMERCIAL

## 2023-06-07 VITALS
WEIGHT: 171 LBS | HEART RATE: 83 BPM | HEIGHT: 63.19 IN | OXYGEN SATURATION: 97 % | DIASTOLIC BLOOD PRESSURE: 76 MMHG | RESPIRATION RATE: 18 BRPM | TEMPERATURE: 97 F | BODY MASS INDEX: 29.92 KG/M2 | SYSTOLIC BLOOD PRESSURE: 122 MMHG

## 2023-06-07 DIAGNOSIS — Z17.1 MALIGNANT NEOPLASM OF UPPER-OUTER QUADRANT OF LEFT BREAST IN FEMALE, ESTROGEN RECEPTOR NEGATIVE (HCC): ICD-10-CM

## 2023-06-07 DIAGNOSIS — R19.7 DIARRHEA, UNSPECIFIED TYPE: ICD-10-CM

## 2023-06-07 DIAGNOSIS — D64.89 ANEMIA DUE TO OTHER CAUSE, NOT CLASSIFIED: ICD-10-CM

## 2023-06-07 DIAGNOSIS — C50.412 MALIGNANT NEOPLASM OF UPPER-OUTER QUADRANT OF LEFT BREAST IN FEMALE, ESTROGEN RECEPTOR NEGATIVE (HCC): Primary | ICD-10-CM

## 2023-06-07 DIAGNOSIS — D64.9 ANEMIA, UNSPECIFIED TYPE: ICD-10-CM

## 2023-06-07 DIAGNOSIS — C50.412 MALIGNANT NEOPLASM OF UPPER-OUTER QUADRANT OF LEFT BREAST IN FEMALE, ESTROGEN RECEPTOR NEGATIVE (HCC): ICD-10-CM

## 2023-06-07 DIAGNOSIS — Z17.1 MALIGNANT NEOPLASM OF UPPER-OUTER QUADRANT OF LEFT BREAST IN FEMALE, ESTROGEN RECEPTOR NEGATIVE (HCC): Primary | ICD-10-CM

## 2023-06-07 DIAGNOSIS — D64.9 ANEMIA, UNSPECIFIED TYPE: Primary | ICD-10-CM

## 2023-06-07 DIAGNOSIS — T45.1X5D ADVERSE EFFECT OF CHEMOTHERAPY, SUBSEQUENT ENCOUNTER: ICD-10-CM

## 2023-06-07 LAB
ALBUMIN SERPL-MCNC: 2.6 G/DL (ref 3.4–5)
ALBUMIN/GLOB SERPL: 0.7 {RATIO} (ref 1–2)
ALP LIVER SERPL-CCNC: 63 U/L
ALT SERPL-CCNC: 35 U/L
ANION GAP SERPL CALC-SCNC: 3 MMOL/L (ref 0–18)
AST SERPL-CCNC: 23 U/L (ref 15–37)
BASOPHILS # BLD AUTO: 0.02 X10(3) UL (ref 0–0.2)
BASOPHILS NFR BLD AUTO: 0.3 %
BILIRUB SERPL-MCNC: 0.3 MG/DL (ref 0.1–2)
BUN BLD-MCNC: 18 MG/DL (ref 7–18)
CALCIUM BLD-MCNC: 8.6 MG/DL (ref 8.5–10.1)
CHLORIDE SERPL-SCNC: 106 MMOL/L (ref 98–112)
CO2 SERPL-SCNC: 26 MMOL/L (ref 21–32)
CREAT BLD-MCNC: 0.89 MG/DL
EOSINOPHIL # BLD AUTO: 0.15 X10(3) UL (ref 0–0.7)
EOSINOPHIL NFR BLD AUTO: 2.6 %
ERYTHROCYTE [DISTWIDTH] IN BLOOD BY AUTOMATED COUNT: 17.3 %
GFR SERPLBLD BASED ON 1.73 SQ M-ARVRAT: 66 ML/MIN/1.73M2 (ref 60–?)
GLOBULIN PLAS-MCNC: 3.6 G/DL (ref 2.8–4.4)
GLUCOSE BLD-MCNC: 132 MG/DL (ref 70–99)
HCT VFR BLD AUTO: 27.2 %
HGB BLD-MCNC: 8.9 G/DL
IMM GRANULOCYTES # BLD AUTO: 0.22 X10(3) UL (ref 0–1)
IMM GRANULOCYTES NFR BLD: 3.8 %
IRON SATN MFR SERPL: 7 %
IRON SERPL-MCNC: 22 UG/DL
LYMPHOCYTES # BLD AUTO: 1.17 X10(3) UL (ref 1–4)
LYMPHOCYTES NFR BLD AUTO: 20 %
MCH RBC QN AUTO: 26.7 PG (ref 26–34)
MCHC RBC AUTO-ENTMCNC: 32.7 G/DL (ref 31–37)
MCV RBC AUTO: 81.7 FL
MONOCYTES # BLD AUTO: 0.61 X10(3) UL (ref 0.1–1)
MONOCYTES NFR BLD AUTO: 10.4 %
NEUTROPHILS # BLD AUTO: 3.68 X10 (3) UL (ref 1.5–7.7)
NEUTROPHILS # BLD AUTO: 3.68 X10(3) UL (ref 1.5–7.7)
NEUTROPHILS NFR BLD AUTO: 62.9 %
OSMOLALITY SERPL CALC.SUM OF ELEC: 284 MOSM/KG (ref 275–295)
PLATELET # BLD AUTO: 331 10(3)UL (ref 150–450)
POTASSIUM SERPL-SCNC: 3.6 MMOL/L (ref 3.5–5.1)
PROT SERPL-MCNC: 6.2 G/DL (ref 6.4–8.2)
RBC # BLD AUTO: 3.33 X10(6)UL
SODIUM SERPL-SCNC: 135 MMOL/L (ref 136–145)
TIBC SERPL-MCNC: 331 UG/DL (ref 240–450)
TRANSFERRIN SERPL-MCNC: 222 MG/DL (ref 200–360)
WBC # BLD AUTO: 5.9 X10(3) UL (ref 4–11)

## 2023-06-07 PROCEDURE — 83550 IRON BINDING TEST: CPT

## 2023-06-07 PROCEDURE — 85025 COMPLETE CBC W/AUTO DIFF WBC: CPT

## 2023-06-07 PROCEDURE — 99215 OFFICE O/P EST HI 40 MIN: CPT | Performed by: INTERNAL MEDICINE

## 2023-06-07 PROCEDURE — 83540 ASSAY OF IRON: CPT

## 2023-06-07 PROCEDURE — 80053 COMPREHEN METABOLIC PANEL: CPT

## 2023-06-07 PROCEDURE — 96375 TX/PRO/DX INJ NEW DRUG ADDON: CPT

## 2023-06-07 PROCEDURE — 96413 CHEMO IV INFUSION 1 HR: CPT

## 2023-06-07 NOTE — PROGRESS NOTES
Education Record    Learner:  Patient/ spouse    Disease / Diagnosis:  Breast ca/  S/P neoadjuvant tx  OTV Traztuzumab only   Barriers / Limitations:  None   Comments:    Method:  Discussion   Comments:    General Topics:  Plan of care reviewed   Comments:    Outcome:  Shows understanding   Comments:  Pt has post treatment MRI, scheduled to see surgeon Dr Kendrick Jordan,  6/14    She reports more fatigue than previous treatments. Appetite is good; eating ok but feels full earlier. Denies N,V. Cyclical constipation and diarrhea that she manages with pepto. Is sleeping more; some abdominal pain that can relieve with BM.  occ passes mucus rectally. Emotional support offered.

## 2023-06-07 NOTE — PROGRESS NOTES
Pt here for C5D1 Trazimera/Venofer 200mg L0vkgpv. Arrives Ambulating independently, accompanied by Spouse       Oral medications included in this regimen:  no    Patient confirms comprehension of cancer treatment schedule:  yes    Pregnancy screening:  Not applicable    Modifications in dose or schedule:  No    Medications appearance and physical integrity checked by RN. Chemotherapy IV pump settings verified by 2 RNs:  yes     Frequency of blood return and site check throughout administration: Prior to administration and At completion of therapy     Infusion/treatment outcome:  patient tolerated treatment without incident    Education Record    Learner:  Patient and Spouse  Barriers / Limitations:  None  Method:  Discussion and Reinforcement  Education / instructions given:  Side effects reviewed regarding Venofer medication administration. Plan of care discussed with patient and spouse.    Outcome:  Shows understanding    Discharged Home, Ambulating independently, accompanied by:Self and Spouse    Patient/family verbalized understanding of future appointments: by printed AVS

## 2023-06-13 ENCOUNTER — APPOINTMENT (OUTPATIENT)
Dept: HEMATOLOGY/ONCOLOGY | Facility: HOSPITAL | Age: 78
End: 2023-06-13
Attending: SURGERY
Payer: COMMERCIAL

## 2023-06-16 ENCOUNTER — NURSE NAVIGATOR ENCOUNTER (OUTPATIENT)
Dept: HEMATOLOGY/ONCOLOGY | Facility: HOSPITAL | Age: 78
End: 2023-06-16

## 2023-06-16 ENCOUNTER — TELEPHONE (OUTPATIENT)
Facility: LOCATION | Age: 78
End: 2023-06-16

## 2023-06-16 ENCOUNTER — OFFICE VISIT (OUTPATIENT)
Facility: LOCATION | Age: 78
End: 2023-06-16
Payer: COMMERCIAL

## 2023-06-16 VITALS — TEMPERATURE: 97 F | HEART RATE: 80 BPM

## 2023-06-16 DIAGNOSIS — D05.12 DUCTAL CARCINOMA IN SITU (DCIS) OF LEFT BREAST: ICD-10-CM

## 2023-06-16 DIAGNOSIS — C50.412 MALIGNANT NEOPLASM OF UPPER-OUTER QUADRANT OF LEFT BREAST IN FEMALE, ESTROGEN RECEPTOR NEGATIVE (HCC): Primary | ICD-10-CM

## 2023-06-16 DIAGNOSIS — C50.919 MALIGNANT NEOPLASM OF FEMALE BREAST, UNSPECIFIED ESTROGEN RECEPTOR STATUS, UNSPECIFIED LATERALITY, UNSPECIFIED SITE OF BREAST (HCC): Primary | ICD-10-CM

## 2023-06-16 DIAGNOSIS — Z17.1 MALIGNANT NEOPLASM OF UPPER-OUTER QUADRANT OF LEFT BREAST IN FEMALE, ESTROGEN RECEPTOR NEGATIVE (HCC): Primary | ICD-10-CM

## 2023-06-16 PROCEDURE — 99215 OFFICE O/P EST HI 40 MIN: CPT | Performed by: SURGERY

## 2023-06-16 NOTE — PROGRESS NOTES
Called patient's , Chloe Vazquez, back in regards to her VM about if I could change the surgery date of his wife from July 27, 2023. I stated I do not have the ability to change surgery dates. He stated that he called [facility] and spoke to Dr. Josh Duran today and they were scheduled for surgery on July 27, 2023. I stated what I can do is message Dr. Josh Duran and her staff about moving their surgery date sooner. He stated that their family is leaving and that is their support in helping his wife with her surgery and care. Allowed him to vent in regards to his frustration about his wife's surgery date. Discussed with him about needing to also schedule for the pre op mastectomy class that is offered every Wednesday at 1100 at the [facility] and he stated he wanted to wait to schedule in case their surgery date is changed. Provided with the breast nurse navigators contact information and was encouraged to phone with any other questions or concerns.

## 2023-06-27 ENCOUNTER — OFFICE VISIT (OUTPATIENT)
Dept: HEMATOLOGY/ONCOLOGY | Facility: HOSPITAL | Age: 78
End: 2023-06-27
Attending: SURGERY
Payer: COMMERCIAL

## 2023-06-27 VITALS
RESPIRATION RATE: 20 BRPM | OXYGEN SATURATION: 95 % | SYSTOLIC BLOOD PRESSURE: 130 MMHG | BODY MASS INDEX: 30.18 KG/M2 | DIASTOLIC BLOOD PRESSURE: 74 MMHG | HEIGHT: 63.19 IN | WEIGHT: 172.5 LBS | HEART RATE: 70 BPM | TEMPERATURE: 97 F

## 2023-06-27 DIAGNOSIS — T45.1X5D ADVERSE EFFECT OF CHEMOTHERAPY, SUBSEQUENT ENCOUNTER: ICD-10-CM

## 2023-06-27 DIAGNOSIS — Z17.1 MALIGNANT NEOPLASM OF UPPER-OUTER QUADRANT OF LEFT BREAST IN FEMALE, ESTROGEN RECEPTOR NEGATIVE (HCC): Primary | ICD-10-CM

## 2023-06-27 DIAGNOSIS — D64.89 ANEMIA DUE TO OTHER CAUSE, NOT CLASSIFIED: ICD-10-CM

## 2023-06-27 DIAGNOSIS — T45.1X5A CHEMOTHERAPY-INDUCED NAUSEA: ICD-10-CM

## 2023-06-27 DIAGNOSIS — R11.0 CHEMOTHERAPY-INDUCED NAUSEA: ICD-10-CM

## 2023-06-27 DIAGNOSIS — C50.412 MALIGNANT NEOPLASM OF UPPER-OUTER QUADRANT OF LEFT BREAST IN FEMALE, ESTROGEN RECEPTOR NEGATIVE (HCC): Primary | ICD-10-CM

## 2023-06-27 LAB
ALBUMIN SERPL-MCNC: 2.9 G/DL (ref 3.4–5)
ALBUMIN/GLOB SERPL: 0.7 {RATIO} (ref 1–2)
ALP LIVER SERPL-CCNC: 74 U/L
ALT SERPL-CCNC: 24 U/L
ANION GAP SERPL CALC-SCNC: 7 MMOL/L (ref 0–18)
AST SERPL-CCNC: 19 U/L (ref 15–37)
BASOPHILS # BLD AUTO: 0.02 X10(3) UL (ref 0–0.2)
BASOPHILS NFR BLD AUTO: 0.2 %
BILIRUB SERPL-MCNC: 0.4 MG/DL (ref 0.1–2)
BUN BLD-MCNC: 20 MG/DL (ref 7–18)
CALCIUM BLD-MCNC: 9.7 MG/DL (ref 8.5–10.1)
CHLORIDE SERPL-SCNC: 105 MMOL/L (ref 98–112)
CO2 SERPL-SCNC: 28 MMOL/L (ref 21–32)
CREAT BLD-MCNC: 0.81 MG/DL
EOSINOPHIL # BLD AUTO: 0.41 X10(3) UL (ref 0–0.7)
EOSINOPHIL NFR BLD AUTO: 5.1 %
ERYTHROCYTE [DISTWIDTH] IN BLOOD BY AUTOMATED COUNT: 16.6 %
GFR SERPLBLD BASED ON 1.73 SQ M-ARVRAT: 74 ML/MIN/1.73M2 (ref 60–?)
GLOBULIN PLAS-MCNC: 4.3 G/DL (ref 2.8–4.4)
GLUCOSE BLD-MCNC: 110 MG/DL (ref 70–99)
HCT VFR BLD AUTO: 30 %
HGB BLD-MCNC: 9.2 G/DL
IMM GRANULOCYTES # BLD AUTO: 0.06 X10(3) UL (ref 0–1)
IMM GRANULOCYTES NFR BLD: 0.7 %
LYMPHOCYTES # BLD AUTO: 1.76 X10(3) UL (ref 1–4)
LYMPHOCYTES NFR BLD AUTO: 21.9 %
MCH RBC QN AUTO: 26.1 PG (ref 26–34)
MCHC RBC AUTO-ENTMCNC: 30.7 G/DL (ref 31–37)
MCV RBC AUTO: 85 FL
MONOCYTES # BLD AUTO: 0.6 X10(3) UL (ref 0.1–1)
MONOCYTES NFR BLD AUTO: 7.5 %
NEUTROPHILS # BLD AUTO: 5.19 X10 (3) UL (ref 1.5–7.7)
NEUTROPHILS # BLD AUTO: 5.19 X10(3) UL (ref 1.5–7.7)
NEUTROPHILS NFR BLD AUTO: 64.6 %
OSMOLALITY SERPL CALC.SUM OF ELEC: 293 MOSM/KG (ref 275–295)
PLATELET # BLD AUTO: 254 10(3)UL (ref 150–450)
POTASSIUM SERPL-SCNC: 3.6 MMOL/L (ref 3.5–5.1)
PROT SERPL-MCNC: 7.2 G/DL (ref 6.4–8.2)
RBC # BLD AUTO: 3.53 X10(6)UL
SODIUM SERPL-SCNC: 140 MMOL/L (ref 136–145)
WBC # BLD AUTO: 8 X10(3) UL (ref 4–11)

## 2023-06-27 PROCEDURE — 99215 OFFICE O/P EST HI 40 MIN: CPT | Performed by: INTERNAL MEDICINE

## 2023-06-27 PROCEDURE — 96375 TX/PRO/DX INJ NEW DRUG ADDON: CPT

## 2023-06-27 PROCEDURE — 80053 COMPREHEN METABOLIC PANEL: CPT

## 2023-06-27 PROCEDURE — 85025 COMPLETE CBC W/AUTO DIFF WBC: CPT

## 2023-06-27 PROCEDURE — 96413 CHEMO IV INFUSION 1 HR: CPT

## 2023-06-27 RX ORDER — POTASSIUM CHLORIDE 20 MEQ/1
20 TABLET, EXTENDED RELEASE ORAL DAILY
Qty: 30 TABLET | Refills: 1 | Status: SHIPPED | OUTPATIENT
Start: 2023-06-27

## 2023-07-10 ENCOUNTER — NURSE NAVIGATOR ENCOUNTER (OUTPATIENT)
Dept: HEMATOLOGY/ONCOLOGY | Facility: HOSPITAL | Age: 78
End: 2023-07-10

## 2023-07-10 NOTE — PROGRESS NOTES
Patient called asking if I was able to reschedule her sentinel lymph node procedure on the day before her scheduled surgery on July 27th. I stated that was not in my scope of practice to reschedule that procedure and I stated that many people have their sentinel lymph node procedures the day before their surgery. Discussed the procedure itself and answered her questions to the best of my ability. She thanked me for the assistance. Pt was provided with the breast nurse navigators contact information and was encouraged to phone with any other questions or concerns.

## 2023-07-14 RX ORDER — ASPIRIN 81 MG/1
81 TABLET ORAL DAILY
Status: ON HOLD | COMMUNITY
End: 2023-07-27

## 2023-07-17 ENCOUNTER — TELEPHONE (OUTPATIENT)
Dept: GENERAL RADIOLOGY | Facility: HOSPITAL | Age: 78
End: 2023-07-17

## 2023-07-17 NOTE — TELEPHONE ENCOUNTER
0915: Spoke with Sofia Brady  Discussed sentinel lymph node mapping procedure to be done in the nuclear medicine department Wednesday, July 26 at [address]  Procedure explained and all questions answered to the best of my ability. Ms. Ti Gomes instructed to arrive 45 minutes prior to appointment time, park in the [facility] through the [address] entrance. Ms. Ti Gomes verbalized understanding and agreement. Encouraged Ms. Sena to contact the breast center or Dr. Bobby Aldrich office if she has questions or concerns prior to surgery date. Kelsi Sena verbalized agreement and gratitude for the call.

## 2023-07-18 ENCOUNTER — OFFICE VISIT (OUTPATIENT)
Dept: HEMATOLOGY/ONCOLOGY | Facility: HOSPITAL | Age: 78
End: 2023-07-18
Attending: SURGERY
Payer: COMMERCIAL

## 2023-07-18 VITALS
RESPIRATION RATE: 18 BRPM | OXYGEN SATURATION: 95 % | BODY MASS INDEX: 28.92 KG/M2 | TEMPERATURE: 97 F | DIASTOLIC BLOOD PRESSURE: 70 MMHG | HEIGHT: 64.49 IN | HEART RATE: 64 BPM | SYSTOLIC BLOOD PRESSURE: 133 MMHG | WEIGHT: 171.5 LBS

## 2023-07-18 DIAGNOSIS — Z17.1 MALIGNANT NEOPLASM OF UPPER-OUTER QUADRANT OF LEFT BREAST IN FEMALE, ESTROGEN RECEPTOR NEGATIVE: Primary | ICD-10-CM

## 2023-07-18 DIAGNOSIS — D64.89 ANEMIA DUE TO OTHER CAUSE, NOT CLASSIFIED: ICD-10-CM

## 2023-07-18 DIAGNOSIS — C50.412 MALIGNANT NEOPLASM OF UPPER-OUTER QUADRANT OF LEFT BREAST IN FEMALE, ESTROGEN RECEPTOR NEGATIVE: Primary | ICD-10-CM

## 2023-07-18 LAB
ALBUMIN SERPL-MCNC: 3.2 G/DL (ref 3.4–5)
ALBUMIN/GLOB SERPL: 0.8 {RATIO} (ref 1–2)
ALP LIVER SERPL-CCNC: 87 U/L
ALT SERPL-CCNC: 28 U/L
ANION GAP SERPL CALC-SCNC: 5 MMOL/L (ref 0–18)
AST SERPL-CCNC: 18 U/L (ref 15–37)
BASOPHILS # BLD AUTO: 0.02 X10(3) UL (ref 0–0.2)
BASOPHILS NFR BLD AUTO: 0.3 %
BILIRUB SERPL-MCNC: 0.5 MG/DL (ref 0.1–2)
BUN BLD-MCNC: 23 MG/DL (ref 7–18)
CALCIUM BLD-MCNC: 9.2 MG/DL (ref 8.5–10.1)
CHLORIDE SERPL-SCNC: 107 MMOL/L (ref 98–112)
CO2 SERPL-SCNC: 26 MMOL/L (ref 21–32)
CREAT BLD-MCNC: 0.88 MG/DL
EOSINOPHIL # BLD AUTO: 0.23 X10(3) UL (ref 0–0.7)
EOSINOPHIL NFR BLD AUTO: 3.2 %
ERYTHROCYTE [DISTWIDTH] IN BLOOD BY AUTOMATED COUNT: 14.9 %
FASTING STATUS PATIENT QL REPORTED: NO
GFR SERPLBLD BASED ON 1.73 SQ M-ARVRAT: 67 ML/MIN/1.73M2 (ref 60–?)
GLOBULIN PLAS-MCNC: 4 G/DL (ref 2.8–4.4)
GLUCOSE BLD-MCNC: 118 MG/DL (ref 70–99)
HCT VFR BLD AUTO: 32 %
HGB BLD-MCNC: 10.2 G/DL
IMM GRANULOCYTES # BLD AUTO: 0.04 X10(3) UL (ref 0–1)
IMM GRANULOCYTES NFR BLD: 0.6 %
LYMPHOCYTES # BLD AUTO: 1.71 X10(3) UL (ref 1–4)
LYMPHOCYTES NFR BLD AUTO: 24.2 %
MCH RBC QN AUTO: 26.6 PG (ref 26–34)
MCHC RBC AUTO-ENTMCNC: 31.9 G/DL (ref 31–37)
MCV RBC AUTO: 83.6 FL
MONOCYTES # BLD AUTO: 0.47 X10(3) UL (ref 0.1–1)
MONOCYTES NFR BLD AUTO: 6.6 %
NEUTROPHILS # BLD AUTO: 4.61 X10 (3) UL (ref 1.5–7.7)
NEUTROPHILS # BLD AUTO: 4.61 X10(3) UL (ref 1.5–7.7)
NEUTROPHILS NFR BLD AUTO: 65.1 %
OSMOLALITY SERPL CALC.SUM OF ELEC: 291 MOSM/KG (ref 275–295)
PLATELET # BLD AUTO: 264 10(3)UL (ref 150–450)
POTASSIUM SERPL-SCNC: 3.5 MMOL/L (ref 3.5–5.1)
PROT SERPL-MCNC: 7.2 G/DL (ref 6.4–8.2)
RBC # BLD AUTO: 3.83 X10(6)UL
SODIUM SERPL-SCNC: 138 MMOL/L (ref 136–145)
WBC # BLD AUTO: 7.1 X10(3) UL (ref 4–11)

## 2023-07-18 PROCEDURE — 99215 OFFICE O/P EST HI 40 MIN: CPT | Performed by: NURSE PRACTITIONER

## 2023-07-18 PROCEDURE — 96413 CHEMO IV INFUSION 1 HR: CPT

## 2023-07-18 PROCEDURE — 85025 COMPLETE CBC W/AUTO DIFF WBC: CPT

## 2023-07-18 PROCEDURE — 96375 TX/PRO/DX INJ NEW DRUG ADDON: CPT

## 2023-07-18 PROCEDURE — 80053 COMPREHEN METABOLIC PANEL: CPT

## 2023-07-18 NOTE — PROGRESS NOTES
Patient presents with: Follow - Up: APN assessment prior to treatment  Chemotherapy    Pt is here for treatment - C7 D1 trastuzumab/venofer is expected. She is scheduled for surgery with Dr Eleonora Ring on 07/27/23 for left mastectomy; feeling nervous. Eating and drinking without issue; denies N,V,D,C. Does have occasional acid reflux. Low energy the days after treatment and then improves over time. Chronic pain in knees is exacerbated; left knee got an injection.      Education Record    Learner:  Patient and Spouse    Disease / Diagnosis: breast cancer    Barriers / Limitations:  None   Comments:    Method:  Brief focused   Comments:    General Topics:  Diet, Medication, Pain, Side effects and symptom management, and Plan of care reviewed   Comments:    Outcome:  Shows understanding   Comments:

## 2023-07-18 NOTE — PROGRESS NOTES
Pt here for C7D1 Herceptin/Venofer. Arrives Ambulating independently, accompanied by Self       Oral medications included in this regimen:  no    Patient confirms comprehension of cancer treatment schedule:  yes    Pregnancy screening:  Not applicable    Modifications in dose or schedule:  No    Medications appearance and physical integrity checked by RN.     Frequency of blood return and site check throughout administration: Prior to administration and At completion of therapy     Infusion/treatment outcome:  patient tolerated treatment without incident    Education Record    Learner:  Patient and Spouse  Barriers / Limitations:  None  Method:  Brief focused  Education / instructions given:  schedule reviewed  Outcome:  Shows understanding    Discharged Home, Ambulating independently, accompanied by:Self and Spouse    Patient/family verbalized understanding of future appointments: by [facility]

## 2023-07-19 ENCOUNTER — NURSE NAVIGATOR ENCOUNTER (OUTPATIENT)
Dept: HEMATOLOGY/ONCOLOGY | Facility: HOSPITAL | Age: 78
End: 2023-07-19

## 2023-07-19 ENCOUNTER — LABORATORY ENCOUNTER (OUTPATIENT)
Dept: LAB | Facility: HOSPITAL | Age: 78
End: 2023-07-19
Attending: SURGERY
Payer: COMMERCIAL

## 2023-07-19 ENCOUNTER — EKG ENCOUNTER (OUTPATIENT)
Dept: LAB | Facility: HOSPITAL | Age: 78
End: 2023-07-19
Attending: SURGERY
Payer: COMMERCIAL

## 2023-07-19 ENCOUNTER — APPOINTMENT (OUTPATIENT)
Dept: HEMATOLOGY/ONCOLOGY | Facility: HOSPITAL | Age: 78
End: 2023-07-19
Attending: SURGERY
Payer: COMMERCIAL

## 2023-07-19 DIAGNOSIS — E78.2 MIXED HYPERLIPIDEMIA: ICD-10-CM

## 2023-07-19 DIAGNOSIS — D05.12 DUCTAL CARCINOMA IN SITU (DCIS) OF LEFT BREAST: ICD-10-CM

## 2023-07-19 DIAGNOSIS — C50.412 MALIGNANT NEOPLASM OF UPPER-OUTER QUADRANT OF LEFT BREAST IN FEMALE, ESTROGEN RECEPTOR NEGATIVE: ICD-10-CM

## 2023-07-19 DIAGNOSIS — I10 PRIMARY HYPERTENSION: ICD-10-CM

## 2023-07-19 DIAGNOSIS — Z17.1 MALIGNANT NEOPLASM OF UPPER-OUTER QUADRANT OF LEFT BREAST IN FEMALE, ESTROGEN RECEPTOR NEGATIVE: ICD-10-CM

## 2023-07-19 DIAGNOSIS — R07.9 ACUTE CHEST PAIN: ICD-10-CM

## 2023-07-19 LAB
ATRIAL RATE: 69 BPM
CHLORIDE SERPL-SCNC: 106 MMOL/L (ref 98–112)
CO2 SERPL-SCNC: 29 MMOL/L (ref 21–32)
P AXIS: 73 DEGREES
P-R INTERVAL: 152 MS
POTASSIUM SERPL-SCNC: 3.8 MMOL/L (ref 3.5–5.1)
Q-T INTERVAL: 398 MS
QRS DURATION: 84 MS
QTC CALCULATION (BEZET): 426 MS
R AXIS: 10 DEGREES
SODIUM SERPL-SCNC: 140 MMOL/L (ref 136–145)
T AXIS: 47 DEGREES
VENTRICULAR RATE: 69 BPM

## 2023-07-19 PROCEDURE — 93005 ELECTROCARDIOGRAM TRACING: CPT

## 2023-07-19 PROCEDURE — 80051 ELECTROLYTE PANEL: CPT

## 2023-07-19 PROCEDURE — 93010 ELECTROCARDIOGRAM REPORT: CPT | Performed by: INTERNAL MEDICINE

## 2023-07-19 PROCEDURE — 36415 COLL VENOUS BLD VENIPUNCTURE: CPT

## 2023-07-19 NOTE — PROGRESS NOTES
Patient and support person attended pre-operative mastectomy course in the cancer center. Discussed what to expect on day of surgery, PAT phone calls, sentinel lymph node mapping procedure, mastectomy bra and dressings, drain care and measurement on drain log, pain control, medication and constipation prevention, lymphedema awareness, post-op exercises and provided samples of various styles of breast prosthesis. During class was provided ample hands-on time to practice stripping ROX drain tubing, reviewing drainage cup and reattaching ROX drain to mastectomy bras. Patient was provided a bag with heart-shaped splinting pillow, drain lanyard, gauze/kerlix, bio patch, tegaderm, abd pads and drainage cup. Resources provided for Node Management, post-operative resources, drain log and mastectomy education. Pathology and follow-up timelines reviewed. Re-enforced teaching regarding emergency care and when to contact surgeon's office. After class emailed PowerPoint slides, drain care video and supplemental information from the [employer]. Patient was given navigator contact information and encouraged to reach out with any other questions or concerns.

## 2023-07-25 ENCOUNTER — OFFICE VISIT (OUTPATIENT)
Facility: LOCATION | Age: 78
End: 2023-07-25
Payer: COMMERCIAL

## 2023-07-25 DIAGNOSIS — H61.22 CERUMINOSIS, LEFT: Primary | ICD-10-CM

## 2023-07-25 PROCEDURE — 99213 OFFICE O/P EST LOW 20 MIN: CPT | Performed by: OTOLARYNGOLOGY

## 2023-07-25 NOTE — PROGRESS NOTES
Belén Avilez is a 77year old female. Patient presents with:  Ear Problem    HPI:   Patient complains of plugged sensation left ear. She denies fevers chills nausea vomiting. No history of ear surgery. Current Outpatient Medications   Medication Sig Dispense Refill    aspirin 81 MG Oral Tab EC Take 1 tablet (81 mg total) by mouth daily. potassium chloride 20 MEQ Oral Tab CR Take 1 tablet (20 mEq total) by mouth daily. 30 tablet 1    atorvastatin 20 MG Oral Tab Take 1 tablet (20 mg total) by mouth nightly. prochlorperazine (COMPAZINE) 10 mg tablet Take 1 tablet (10 mg total) by mouth every 6 (six) hours as needed for Nausea. 30 tablet 3    ondansetron (ZOFRAN) 8 MG tablet Take 1 tablet (8 mg total) by mouth every 8 (eight) hours as needed for Nausea. 30 tablet 3    hydrochlorothiazide 25 MG Oral Tab Take 1 tablet (25 mg total) by mouth daily. Hold if systolic BP is below 226  0    Losartan Potassium 25 MG Oral Tab Take 1.5 tablets (37.5 mg total) by mouth nightly. Hold if systolic BP is below 858  1.5 tablets at HS 30 tablet 11    Acetaminophen 500 MG Oral Cap Take 1-2 capsules (500-1,000 mg total) by mouth every 6 (six) hours as needed for Pain. 60 capsule 2    Levothyroxine Sodium 75 MCG Oral Tab Take 1 tablet (75 mcg total) by mouth before breakfast.      ergocalciferol 45031 UNITS Oral Cap Take 1 capsule (50,000 Units total) by mouth once a week.  0    Multiple Vitamins-Minerals (MULTIVITAMIN WITH MINERALS) Oral Tab Take 1 tablet by mouth daily.         Past Medical History:   Diagnosis Date    Back pain     Back problem     Breast cancer ([address]     CAD (coronary artery disease) 01/10/2014    S/p LHC/ PCI DALY to RCA     Chest pain 01/10/2014    Coronary atherosclerosis     Disorder of thyroid     Essential hypertension     High blood pressure     High cholesterol     History of blood transfusion 1991    prior to hysterectomy    Hyperlipidemia     Melasma 05/07/2013    Migraines     OA (osteoarthritis) 05/07/2013    Osteoarthritis     Osteoporosis     Personal history of antineoplastic chemotherapy     last tx 5/30/2023      Social History:  Social History     Socioeconomic History    Marital status:    Tobacco Use    Smoking status: Never    Smokeless tobacco: Never   Vaping Use    Vaping Use: Never used   Substance and Sexual Activity    Alcohol use: No    Drug use: No      Past Surgical History:   Procedure Laterality Date    ANGIOPLASTY (CORONARY)  2014    stent    CATH BARE METAL STENT (BMS)      HYSTERECTOMY  1990    partial hystero. TRINA BIOPSY STEREO NODULE 1 SITE LEFT (CPT=19081)  06/2021    Benign    TRINA LOCALIZATION WIRE 1 SITE LEFT (CPT=19281) Left 1990    benign. NM PARATHYROID  2012    OTHER SURGICAL HISTORY  2004    Left eye vitrectomy    OTHER SURGICAL HISTORY  2012    removal of parathyroid lesion    THYROIDECTOMY POST PREV THYR SURG      TOTAL ABDOM HYSTERECTOMY      TOTAL KNEE REPLACEMENT Right 2021         REVIEW OF SYSTEMS:   GENERAL HEALTH: feels well otherwise  GENERAL : denies fever, chills, sweats, weight loss, weight gain  SKIN: denies any unusual skin lesions or rashes  RESPIRATORY: denies shortness of breath with exertion  NEURO: denies headaches    EXAM:   There were no vitals taken for this visit. System Findings Details   Skin Normal Inspection - Normal.   Constitutional Normal Overall appearance - Normal.   Head/Face Normal Facial features - Normal. Eyebrows - Normal. Skull - Normal.   Oral/Oropharynx Normal Lips - Normal, Tonsils - Normal, Tongue - Normal    Nasal Normal External nose - Normal. Nasal septum - Normal, Turbinates - Normal   Neurological Normal Memory - Normal. Cranial nerves - Cranial nerves II through XII grossly intact. Neck Exam Normal Inspection - Normal. Palpation - Normal. Parotid gland - Normal. Thyroid gland - Normal.   Psychiatric Normal Orientation - Oriented to time, place, person & situation. Appropriate mood and affect. Lymph Detail Normal Submental. Submandibular. Anterior cervical. Posterior cervical. Supraclavicular. Eyes Normal Conjunctiva - Right: Normal, Left: Normal. Pupil - Right: Normal, Left: Normal.    Ears Normal Inspection - Right: Normal, Left: Normal. Canal - Left: Cleaned under microscopic guidance with suction and curette. . TM - Right: Normal, Left: Normal.     ASSESSMENT AND PLAN:   Cerumen impaction left ear. Problem is resolved return in 6 months or as needed. The patient indicates understanding of these issues and agrees to the plan.       Dami Poe MD  7/25/2023  3:20 PM

## 2023-07-26 ENCOUNTER — HOSPITAL ENCOUNTER (OUTPATIENT)
Dept: NUCLEAR MEDICINE | Facility: HOSPITAL | Age: 78
Discharge: HOME | End: 2023-07-26
Attending: SURGERY
Payer: COMMERCIAL

## 2023-07-26 DIAGNOSIS — Z17.1 MALIGNANT NEOPLASM OF UPPER-OUTER QUADRANT OF LEFT BREAST IN FEMALE, ESTROGEN RECEPTOR NEGATIVE: ICD-10-CM

## 2023-07-26 DIAGNOSIS — D05.12 DUCTAL CARCINOMA IN SITU (DCIS) OF LEFT BREAST: ICD-10-CM

## 2023-07-26 DIAGNOSIS — C50.412 MALIGNANT NEOPLASM OF UPPER-OUTER QUADRANT OF LEFT BREAST IN FEMALE, ESTROGEN RECEPTOR NEGATIVE: ICD-10-CM

## 2023-07-26 PROCEDURE — 78195 LYMPH SYSTEM IMAGING: CPT | Performed by: SURGERY

## 2023-07-26 RX ORDER — LIDOCAINE AND PRILOCAINE 25; 25 MG/G; MG/G
CREAM TOPICAL
Status: DISCONTINUED
Start: 2023-07-26 | End: 2023-07-27

## 2023-07-27 ENCOUNTER — HOSPITAL ENCOUNTER (OUTPATIENT)
Facility: HOSPITAL | Age: 78
Setting detail: HOSPITAL OUTPATIENT SURGERY
Discharge: HOME | End: 2023-07-27
Attending: SURGERY | Admitting: SURGERY
Payer: COMMERCIAL

## 2023-07-27 ENCOUNTER — ANESTHESIA (OUTPATIENT)
Dept: SURGERY | Facility: HOSPITAL | Age: 78
End: 2023-07-27
Payer: COMMERCIAL

## 2023-07-27 ENCOUNTER — ANESTHESIA EVENT (OUTPATIENT)
Dept: SURGERY | Facility: HOSPITAL | Age: 78
End: 2023-07-27
Payer: COMMERCIAL

## 2023-07-27 VITALS
HEIGHT: 64.5 IN | BODY MASS INDEX: 28.5 KG/M2 | TEMPERATURE: 98 F | RESPIRATION RATE: 18 BRPM | SYSTOLIC BLOOD PRESSURE: 148 MMHG | WEIGHT: 169 LBS | OXYGEN SATURATION: 99 % | HEART RATE: 70 BPM | DIASTOLIC BLOOD PRESSURE: 71 MMHG

## 2023-07-27 DIAGNOSIS — C50.912 MALIGNANT NEOPLASM OF LEFT FEMALE BREAST, UNSPECIFIED ESTROGEN RECEPTOR STATUS, UNSPECIFIED SITE OF BREAST (HCC): ICD-10-CM

## 2023-07-27 DIAGNOSIS — C50.412 MALIGNANT NEOPLASM OF UPPER-OUTER QUADRANT OF LEFT BREAST IN FEMALE, ESTROGEN RECEPTOR NEGATIVE: ICD-10-CM

## 2023-07-27 DIAGNOSIS — Z17.1 MALIGNANT NEOPLASM OF UPPER-OUTER QUADRANT OF LEFT BREAST IN FEMALE, ESTROGEN RECEPTOR NEGATIVE: ICD-10-CM

## 2023-07-27 DIAGNOSIS — D05.12 DUCTAL CARCINOMA IN SITU (DCIS) OF LEFT BREAST: ICD-10-CM

## 2023-07-27 DIAGNOSIS — R07.9 ACUTE CHEST PAIN: ICD-10-CM

## 2023-07-27 DIAGNOSIS — I10 PRIMARY HYPERTENSION: Primary | ICD-10-CM

## 2023-07-27 DIAGNOSIS — E78.2 MIXED HYPERLIPIDEMIA: ICD-10-CM

## 2023-07-27 PROCEDURE — 88331 PATH CONSLTJ SURG 1 BLK 1SPC: CPT | Performed by: SURGERY

## 2023-07-27 PROCEDURE — 76942 ECHO GUIDE FOR BIOPSY: CPT | Performed by: ANESTHESIOLOGY

## 2023-07-27 PROCEDURE — 07B60ZX EXCISION OF LEFT AXILLARY LYMPHATIC, OPEN APPROACH, DIAGNOSTIC: ICD-10-PCS | Performed by: SURGERY

## 2023-07-27 PROCEDURE — 88332 PATH CONSLTJ SURG EA ADD BLK: CPT | Performed by: SURGERY

## 2023-07-27 PROCEDURE — 88342 IMHCHEM/IMCYTCHM 1ST ANTB: CPT | Performed by: SURGERY

## 2023-07-27 PROCEDURE — 88307 TISSUE EXAM BY PATHOLOGIST: CPT | Performed by: SURGERY

## 2023-07-27 PROCEDURE — 0HTU0ZZ RESECTION OF LEFT BREAST, OPEN APPROACH: ICD-10-PCS | Performed by: SURGERY

## 2023-07-27 PROCEDURE — 3E0W3KZ INTRODUCTION OF OTHER DIAGNOSTIC SUBSTANCE INTO LYMPHATICS, PERCUTANEOUS APPROACH: ICD-10-PCS | Performed by: SURGERY

## 2023-07-27 RX ORDER — METOCLOPRAMIDE HYDROCHLORIDE 5 MG/ML
10 INJECTION INTRAMUSCULAR; INTRAVENOUS EVERY 8 HOURS PRN
Status: DISCONTINUED | OUTPATIENT
Start: 2023-07-27 | End: 2023-07-27

## 2023-07-27 RX ORDER — DEXAMETHASONE SODIUM PHOSPHATE 10 MG/ML
INJECTION, SOLUTION INTRAMUSCULAR; INTRAVENOUS AS NEEDED
Status: DISCONTINUED | OUTPATIENT
Start: 2023-07-27 | End: 2023-07-27 | Stop reason: SURG

## 2023-07-27 RX ORDER — MEPERIDINE HYDROCHLORIDE 25 MG/ML
12.5 INJECTION INTRAMUSCULAR; INTRAVENOUS; SUBCUTANEOUS AS NEEDED
Status: DISCONTINUED | OUTPATIENT
Start: 2023-07-27 | End: 2023-07-27

## 2023-07-27 RX ORDER — HYDROMORPHONE HYDROCHLORIDE 1 MG/ML
0.2 INJECTION, SOLUTION INTRAMUSCULAR; INTRAVENOUS; SUBCUTANEOUS EVERY 5 MIN PRN
Status: DISCONTINUED | OUTPATIENT
Start: 2023-07-27 | End: 2023-07-27

## 2023-07-27 RX ORDER — HYDROCODONE BITARTRATE AND ACETAMINOPHEN 5; 325 MG/1; MG/1
2 TABLET ORAL ONCE AS NEEDED
Status: COMPLETED | OUTPATIENT
Start: 2023-07-27 | End: 2023-07-27

## 2023-07-27 RX ORDER — DIPHENHYDRAMINE HYDROCHLORIDE 50 MG/ML
12.5 INJECTION INTRAMUSCULAR; INTRAVENOUS AS NEEDED
Status: DISCONTINUED | OUTPATIENT
Start: 2023-07-27 | End: 2023-07-27

## 2023-07-27 RX ORDER — SODIUM CHLORIDE 9 MG/ML
INJECTION INTRAVENOUS AS NEEDED
Status: DISCONTINUED | OUTPATIENT
Start: 2023-07-27 | End: 2023-07-27 | Stop reason: HOSPADM

## 2023-07-27 RX ORDER — LABETALOL HYDROCHLORIDE 5 MG/ML
5 INJECTION, SOLUTION INTRAVENOUS ONCE
Status: DISCONTINUED | OUTPATIENT
Start: 2023-07-27 | End: 2023-07-27

## 2023-07-27 RX ORDER — HYDROMORPHONE HYDROCHLORIDE 1 MG/ML
0.6 INJECTION, SOLUTION INTRAMUSCULAR; INTRAVENOUS; SUBCUTANEOUS EVERY 5 MIN PRN
Status: DISCONTINUED | OUTPATIENT
Start: 2023-07-27 | End: 2023-07-27

## 2023-07-27 RX ORDER — NALOXONE HYDROCHLORIDE 0.4 MG/ML
80 INJECTION, SOLUTION INTRAMUSCULAR; INTRAVENOUS; SUBCUTANEOUS AS NEEDED
Status: DISCONTINUED | OUTPATIENT
Start: 2023-07-27 | End: 2023-07-27

## 2023-07-27 RX ORDER — HYDROCODONE BITARTRATE AND ACETAMINOPHEN 5; 325 MG/1; MG/1
1 TABLET ORAL ONCE AS NEEDED
Status: COMPLETED | OUTPATIENT
Start: 2023-07-27 | End: 2023-07-27

## 2023-07-27 RX ORDER — PHENYLEPHRINE HCL 10 MG/ML
VIAL (ML) INJECTION AS NEEDED
Status: DISCONTINUED | OUTPATIENT
Start: 2023-07-27 | End: 2023-07-27 | Stop reason: SURG

## 2023-07-27 RX ORDER — CEFADROXIL 500 MG/1
500 CAPSULE ORAL 2 TIMES DAILY
Qty: 20 CAPSULE | Refills: 0 | Status: SHIPPED | OUTPATIENT
Start: 2023-07-27 | End: 2023-08-06

## 2023-07-27 RX ORDER — CEFAZOLIN SODIUM/WATER 2 G/20 ML
2 SYRINGE (ML) INTRAVENOUS ONCE
Status: COMPLETED | OUTPATIENT
Start: 2023-07-27 | End: 2023-07-27

## 2023-07-27 RX ORDER — ONDANSETRON 2 MG/ML
INJECTION INTRAMUSCULAR; INTRAVENOUS AS NEEDED
Status: DISCONTINUED | OUTPATIENT
Start: 2023-07-27 | End: 2023-07-27 | Stop reason: SURG

## 2023-07-27 RX ORDER — HYDROCODONE BITARTRATE AND ACETAMINOPHEN 5; 325 MG/1; MG/1
1 TABLET ORAL EVERY 6 HOURS PRN
Qty: 20 TABLET | Refills: 0 | Status: SHIPPED | OUTPATIENT
Start: 2023-07-27

## 2023-07-27 RX ORDER — DEXAMETHASONE SODIUM PHOSPHATE 4 MG/ML
VIAL (ML) INJECTION AS NEEDED
Status: DISCONTINUED | OUTPATIENT
Start: 2023-07-27 | End: 2023-07-27 | Stop reason: SURG

## 2023-07-27 RX ORDER — SODIUM CHLORIDE, SODIUM LACTATE, POTASSIUM CHLORIDE, CALCIUM CHLORIDE 600; 310; 30; 20 MG/100ML; MG/100ML; MG/100ML; MG/100ML
INJECTION, SOLUTION INTRAVENOUS CONTINUOUS
Status: DISCONTINUED | OUTPATIENT
Start: 2023-07-27 | End: 2023-07-27

## 2023-07-27 RX ORDER — ONDANSETRON 2 MG/ML
4 INJECTION INTRAMUSCULAR; INTRAVENOUS EVERY 6 HOURS PRN
Status: DISCONTINUED | OUTPATIENT
Start: 2023-07-27 | End: 2023-07-27

## 2023-07-27 RX ORDER — BUPIVACAINE HYDROCHLORIDE 2.5 MG/ML
INJECTION, SOLUTION EPIDURAL; INFILTRATION; INTRACAUDAL AS NEEDED
Status: DISCONTINUED | OUTPATIENT
Start: 2023-07-27 | End: 2023-07-27 | Stop reason: SURG

## 2023-07-27 RX ORDER — ACETAMINOPHEN 500 MG
1000 TABLET ORAL ONCE
Status: DISCONTINUED | OUTPATIENT
Start: 2023-07-27 | End: 2023-07-27 | Stop reason: HOSPADM

## 2023-07-27 RX ORDER — LABETALOL HYDROCHLORIDE 5 MG/ML
INJECTION, SOLUTION INTRAVENOUS
Status: DISCONTINUED
Start: 2023-07-27 | End: 2023-07-27 | Stop reason: WASHOUT

## 2023-07-27 RX ORDER — ASPIRIN 81 MG/1
81 TABLET ORAL DAILY
Refills: 0 | Status: SHIPPED | COMMUNITY
Start: 2023-07-28

## 2023-07-27 RX ORDER — HYDROMORPHONE HYDROCHLORIDE 1 MG/ML
0.4 INJECTION, SOLUTION INTRAMUSCULAR; INTRAVENOUS; SUBCUTANEOUS EVERY 5 MIN PRN
Status: DISCONTINUED | OUTPATIENT
Start: 2023-07-27 | End: 2023-07-27

## 2023-07-27 RX ORDER — MIDAZOLAM HYDROCHLORIDE 1 MG/ML
1 INJECTION INTRAMUSCULAR; INTRAVENOUS EVERY 5 MIN PRN
Status: DISCONTINUED | OUTPATIENT
Start: 2023-07-27 | End: 2023-07-27

## 2023-07-27 RX ORDER — ACETAMINOPHEN 500 MG
1000 TABLET ORAL ONCE AS NEEDED
Status: COMPLETED | OUTPATIENT
Start: 2023-07-27 | End: 2023-07-27

## 2023-07-27 RX ADMIN — CEFAZOLIN SODIUM/WATER 2 G: 2 G/20 ML SYRINGE (ML) INTRAVENOUS at 09:29:00

## 2023-07-27 RX ADMIN — DEXAMETHASONE SODIUM PHOSPHATE 4 MG: 4 MG/ML VIAL (ML) INJECTION at 09:30:00

## 2023-07-27 RX ADMIN — SODIUM CHLORIDE, SODIUM LACTATE, POTASSIUM CHLORIDE, CALCIUM CHLORIDE: 600; 310; 30; 20 INJECTION, SOLUTION INTRAVENOUS at 11:16:00

## 2023-07-27 RX ADMIN — ONDANSETRON 4 MG: 2 INJECTION INTRAMUSCULAR; INTRAVENOUS at 10:44:00

## 2023-07-27 RX ADMIN — DEXAMETHASONE SODIUM PHOSPHATE 4 MG: 10 INJECTION, SOLUTION INTRAMUSCULAR; INTRAVENOUS at 09:25:00

## 2023-07-27 RX ADMIN — BUPIVACAINE HYDROCHLORIDE 30 ML: 2.5 INJECTION, SOLUTION EPIDURAL; INFILTRATION; INTRACAUDAL at 09:25:00

## 2023-07-27 RX ADMIN — PHENYLEPHRINE HCL 100 MCG: 10 MG/ML VIAL (ML) INJECTION at 09:30:00

## 2023-07-27 NOTE — H&P
History & Physical Examination    Patient Name: Jose Eduardo Rodriguez  MRN: [id number]  [facility]: [id number]  YOB: 1945    Diagnosis: Left breast cancer    Present Illness: The patient is a 65-year-old female who presented initially in February with known HER2 positive left breast cancer. She then underwent MRI, which showed another area of enhancement, separate from the known cancer. Biopsy revealed ductal carcinoma in situ, nuclear grade 3. The patient has since completed neoadjuvant chemotherapy and presents for surgery. aspirin 81 MG Oral Tab EC, Take 1 tablet (81 mg total) by mouth daily. , Disp: , Rfl: , 2023  potassium chloride 20 MEQ Oral Tab CR, Take 1 tablet (20 mEq total) by mouth daily. , Disp: 30 tablet, Rfl: 1, 2023 at 2000  [] diazePAM 10 MG Oral Tab, Take 1 tablet (10 mg total) by mouth one time for 1 dose. Take prior to MRI as needed for anxiety, Disp: 1 tablet, Rfl: 0  atorvastatin 20 MG Oral Tab, Take 1 tablet (20 mg total) by mouth nightly., Disp: , Rfl: , 2023 at 2000  carvedilol 6.25 MG Oral Tab, Take 1 tablet (6.25 mg total) by mouth 2 (two) times daily. 1.5 tablets in am, 2 tablets pm, Disp: , Rfl: , 2023 at 0500  hydrochlorothiazide 25 MG Oral Tab, Take 1 tablet (25 mg total) by mouth daily. Hold if systolic BP is below 997, Disp: , Rfl: 0, 2023  Losartan Potassium 25 MG Oral Tab, Take 1.5 tablets (37.5 mg total) by mouth nightly.  Hold if systolic BP is below 123  1.5 tablets at HS, Disp: 30 tablet, Rfl: 11, 2023 at 2000  Acetaminophen 500 MG Oral Cap, Take 1-2 capsules (500-1,000 mg total) by mouth every 6 (six) hours as needed for Pain., Disp: 60 capsule, Rfl: 2, 2023 at 0500  Levothyroxine Sodium 75 MCG Oral Tab, Take 1 tablet (75 mcg total) by mouth before breakfast., Disp: , Rfl: , 2023 at 0500  ergocalciferol 30118 UNITS Oral Cap, Take 1 capsule (50,000 Units total) by mouth once a week., Disp: , Rfl: 0, 2023  Multiple Vitamins-Minerals (MULTIVITAMIN WITH MINERALS) Oral Tab, Take 1 tablet by mouth daily. , Disp: , Rfl: , Past Week  prochlorperazine (COMPAZINE) 10 mg tablet, Take 1 tablet (10 mg total) by mouth every 6 (six) hours as needed for Nausea., Disp: 30 tablet, Rfl: 3, More than a month  ondansetron (ZOFRAN) 8 MG tablet, Take 1 tablet (8 mg total) by mouth every 8 (eight) hours as needed for Nausea., Disp: 30 tablet, Rfl: 3, More than a month      acetaminophen (Tylenol Extra Strength) tab 1,000 mg, 1,000 mg, Oral, Once  lactated ringers infusion, , Intravenous, Continuous  ceFAZolin (Ancef) 2 g in 20mL IV syringe premix, 2 g, Intravenous, Once        Allergies:   Fosamax [Alendronat*    DIZZINESS    Past Medical History:   Diagnosis Date    Back pain     Back problem     Breast cancer ([facility] [address]     CAD (coronary artery disease) 01/10/2014    S/p LHC/ PCI DALY to RCA     Chest pain 01/10/2014    Coronary atherosclerosis     Disorder of thyroid     Essential hypertension     High blood pressure     High cholesterol     History of blood transfusion 1991    prior to hysterectomy    Hyperlipidemia     Melasma 05/07/2013    Migraines     OA (osteoarthritis) 05/07/2013    Osteoarthritis     Osteoporosis     Personal history of antineoplastic chemotherapy     last tx 5/30/2023     Past Surgical History:   Procedure Laterality Date    ANGIOPLASTY (CORONARY)  2014    stent    CATH BARE METAL STENT (BMS)      HYSTERECTOMY  1990    partial hystero. TRINA BIOPSY STEREO NODULE 1 SITE LEFT (CPT=19081)  06/2021    Benign    TRINA LOCALIZATION WIRE 1 SITE LEFT (CPT=19281) Left 1990    benign.     NM PARATHYROID  2012    OTHER SURGICAL HISTORY  2004    Left eye vitrectomy    OTHER SURGICAL HISTORY  2012    removal of parathyroid lesion    THYROIDECTOMY POST PREV THYR SURG      TOTAL ABDOM HYSTERECTOMY      TOTAL KNEE REPLACEMENT Right 2021     Family History   Problem Relation Age of Onset    Heart Disorder Father         CHF    Heart Disorder Mother         CHF    Lipids Son     Heart Disorder Sister         CABG    Heart Disorder Brother         CAD    Heart Disorder Brother         CAD    Other (Osteoporosis) Sister      Social History    Tobacco Use      Smoking status: Never      Smokeless tobacco: Never    Alcohol use: No      SYSTEM Check if Review is Normal Check if Physical Exam is Normal If not normal, please explain:   HEENT [x ] [x ]    NECK & BACK [x ] [x ]    HEART [x ] [x ]    LUNGS [x ] [x ]    ABDOMEN [x ] [x ]    UROGENITAL [x ] [x ]    EXTREMITIES [x ] [x ]    OTHER   L breast cancer     [ x ] I have discussed the risks and benefits and alternatives with the patient/family. They understand and agree to proceed with plan of care. [ x ] I have reviewed the History and Physical done within the last 30 days. Any changes noted above.     Andrew Powell MD  7/27/2023  8:35 AM

## 2023-07-27 NOTE — ANESTHESIA PROCEDURE NOTES
Airway  Date/Time: 7/27/2023 9:15 AM  Urgency: elective      General Information and Staff    Patient location during procedure: OR  Anesthesiologist: Anamaria Stewart MD  Performed: anesthesiologist   Performed by: Anamaria Stewart MD  Authorized by: Anamaria Stewart MD      Indications and Patient Condition  Indications for airway management: anesthesia  Sedation level: deep  Preoxygenated: yes  Patient position: sniffing  Mask difficulty assessment: 1 - vent by mask    Final Airway Details  Final airway type: supraglottic airway      Successful airway: classic  Size 4       Number of attempts at approach: 1

## 2023-07-27 NOTE — ANESTHESIA PROCEDURE NOTES
Regional Block    Date/Time: 7/27/2023 9:20 AM    Performed by: Mateus Kapoor MD  Authorized by: Mateus Kapoor MD      General Information and Staff    Start Time:  7/27/2023 9:20 AM  End Time:  7/27/2023 9:25 AM  Anesthesiologist:  Mateus Kapoor MD  Performed by: Anesthesiologist  Patient Location:  ED      Site Identification: real time ultrasound guided and image stored and retrievable    Block site/laterality marked before start: site marked  Reason for Block: procedure for pain and at request of ED Physician    Preanesthetic Checklist: 2 patient identifers, IV checked, risks and benefits discussed, monitors and equipment checked, pre-op evaluation, timeout performed, anesthesia consent, sterile technique used, no prohibitive neurological deficits and no local skin infection at insertion site      Procedure Details    Patient Position:  Supine  Prep: ChloraPrep    Monitoring:  Cardiac monitor, continuous pulse ox and blood pressure cuff  Block Type:  PEC1  Laterality:  Left  Injection Technique:  Single-shot    Needle    Needle Type:  Short-bevel and echogenic  Needle Gauge:  21 G  Needle Length:  110 mm  Needle Localization:  Ultrasound guidance  Reason for Ultrasound Use: appropriate spread of the medication was noted in real time and no ultrasound evidence of intravascular and/or intraneural injection            Assessment    Injection Assessment:  Good spread noted, negative aspiration for heme, negative resistance, no pain on injection and incremental injection  Heart Rate Change: No    - Patient tolerated block procedure well without evidence of immediate block related complications.      Medications  7/27/2023 9:20 AM      Additional Comments    Medication:  Bupivacaine 0.25% 30mL

## 2023-07-27 NOTE — DISCHARGE INSTRUCTIONS
Home Care Instructions for Breast Surgery  Dr. Leslie Raya  For post-operative pain control the mediations are usually over the counter preparations such as Advil and Tylenol. For severe pain the patient may take the prescribed Norco or Tylenol #3, which are narcotic pain medications. The patient may also overlap Advil with Tylenol, Tylenol #3 or Norco.  The patient can do this by taking two Tylenol, then three hours later taking two Advil, then three hours later taking Tylenol again. You may resume taking aspirin on Friday, July 28. All other home medications may be resumed as scheduled. DIET  The patient may resume a general diet immediately. There should be no alcohol consumption in the immediate recover time period. If the patient was sedated for the procedure the first meal should be light. WOUND CARE  The top dressings may be removed the day after surgery. This includes the gauze, tape and band-aids if they are present. Do not remove the steri-strips or butterfly tapes that are white and adherent to the skin. The steri-strips will eventually peel up at the ends and at this point they may be removed. This is usually seven to ten days after surgery. The patient may shower the day after surgery. There is no need to cover the incisions and all top gauze type dressings should be removed prior to showering. Soap can get on the wounds but do not scrub over the wounds. No hair dye or chemicals of any kind should get in the wounds. Most wounds will be closed with dissolving suture underneath the skin. These sutures will dissolve on their own. Please measure and record your drain output. When the drain output is less than 30 cc per day x2 days, the drain can be removed. Please call the office to arrange to have your drain removed. ACTIVITY  The patient may ride in a car but should not drive the car for at least overnight if sedation was used.   If no sedation was used the patient may drive immediately. The patient may return to work the next day. Avoid any activity that could lead to the breast getting elbowed or bumped. Avoid bending, pushing, pulling and lifting anything heavier than 25 pounds for two weeks. No jogging or workouts for two weeks. Fast walking and using a treadmill at less than 3.5 miles per hour in the flat position is acceptable. Patients should seek further activity limits at the time of their appointment. Patients should wear a supportive bra, even at night, for two weeks. The best support is with a sports bra. No golfing, tennis, racquetball, volleyball, or extreme sports for two weeks. APPOINTMENT  Darby Rodriguez or Willi Cuellar will call you to set up an appointment 2 weeks after surgery. This is when the pathology results should be available. If the wound turns red, hot, swollen, becomes increasingly painful, or drains pus call us immediately at [phone]. Bleeding requiring a return to the operating room happens two to three percent of the time. Minor bleeding from the incision is expected. A significant bruise can also be expected. Severe swelling of the breast with pain, bluish discoloration, or the passage of blood clots through the incision is an indication for bleeding. The number listed above is our office number. Our phone automatically switches to our answering service if we are not there. For non-emergent care please call our office at 8:30 a.m. Monday through Friday. For emergencies please call us at any time. Thank you for entrusting us with your care.   [facility]--General Surgery         HOW TO EMPTY YOUR DRAIN    Wash hands with soap and water  Hold drain so plug is upright and release it  Turn drain upside down into measuring container and squeeze drain until all the liquid is removed  While squeezing the drain, replace the plug into drain  Measure the liquid and record the amount in your diary twice a day  Be sure to bring your diary to your next visit with the doctor        Drain Record Sheet    Date Time Drain #1 Drain #2

## 2023-07-27 NOTE — OPERATIVE REPORT
[facility]  Op Note    Jose Eduardo Rodriguez Location: OR   CSN [id number] MRN [id number]   Admission Date 7/27/2023 Operation Date 7/27/2023   Attending Physician Yuly Bunn MD Operating Physician Lita Perez MD     DATE OF OPERATION:  7/27/2023  PREOPERATIVE DIAGNOSIS:  Invasive ductal carcinoma, left breast, upper outer quadrant, ER negative  DCIS, lower outer quadrant  POSTOPERATIVE DIAGNOSIS:  Invasive ductal carcinoma, left breast, upper outer quadrant, ER negative  DCIS, lower outer quadrant  PROCEDURE PERFORMED: Left breast mastectomy, left sentinel lymph node biopsy, injection blue dye left side. ANESTHESIA: Gen    SURGEON:  Ruth Harkins M.D.  ASSISTANT: Emmanuel Melgar PA-C    SPECIMEN:  [place] lymph node #1 and #2  Left breast mastectomy   ESTIMATED BLOOD LOSS: 10 mL. COMPLICATIONS: None. INDICATIONS: The patient is a 76-year-old female who initially presented with triple negative left breast cancer. Further work-up revealed a second area of enhancement by MRI. Biopsy revealed DCIS in a separate quadrant. After neoadjuvant chemotherapy, the patient decided to undergo left breast mastectomy, left sentinel lymph node biopsy, possible total left axillary lymph node dissection. The risks, benefits, alternatives were discussed in detail with the patient. Risks included but not limited to, seroma development, infection and bleeding as well as neurovascular injury under the arm, future lymphedema, and the possibility of having to return to the operating room if the initially negative sentinel lymph nodes were eventually positive on permanent stain. The patient is agreeable to proceed with the operation. PROCEDURE: After informed consent was obtained, the patient was taken to the radiology suite where radionucleotide was injected into her breast. She was then brought up to the operating room where general anesthesia was induced.  6 cc of 50% methylene blue was injected into the left breast. The breast was then massaged for 5 minutes. The patient's breasts and axilla were prepped and draped in the usual sterile fashion. The area of greatest radioactivity was identified in the left axilla. An incision was made in this area long the lines of the mastectomy. Cautery was used to obtain hemostasis and to continue the dissection down through the clavipectoral fascia. At this point, blunt dissection was used to identify the first sentinel lymph node. This was grasped with an Allis. It was bluntly dissected free from the surrounding tissue. It was ultimately excised using cautery. Palpation revealed the specimen contained 2 lymph nodes. Both were mildly blue. An ex vivo count was taken, and the lymph nodes were sent off the field. The axilla was then interrogated, and no further hot spots were noted. Palpation revealed no abnormal lymph nodes. The background count was found to be much less than 10% of the hottest lymph node. The wound was then irrigated with normal saline. Attention was turned to the left breast.  An incision was made to encompass the left breast.  Cautery was used to obtain hemostasis. Careful dissection was undertaken to raise tissue flaps going superiorly, medially, inferiorly, and laterally. Once the tissue flaps were raised, the breast tissue was grasped along its medial aspect and raised off the underlying fascia using cautery. During the dissection, pathology called into the room stating that the sentinel lymph nodes were negative for metastasis. Cautery was then used to obtain hemostasis. The wound was irrigated with normal saline. A stitch was placed in the axillary tail of the breast, and the tissue was sent off the field. A 19 [place] Danilo drain was passed out inferior and lateral to the incision. It was secured with a 2-0 nylon. The incision was then closed in 2 layers, using a 3-0 Vicryl on the deep layer and a 4-0 Vicryl in the subcuticular skin. Steri-Strips were placed across the incision as well as a sterile dressing. The patient tolerated the procedure well. She was x-rayed in the OR and transferred to the PACU in good condition. Andrew Powell MD  [place] Node Biopsy for Breast Cancer - Left  Operation performed with curative intent. Yes   Tracer(s) used to identify sentinel nodes in the upfront surgery (non-neoadjuvant) setting (select all that apply). Dye and Radioactive tracer   Tracer(s) used to identify sentinel nodes in the neoadjuvant setting (select all that apply). N/A   All nodes (colored or non-colored) present at the end of a dye-filled lymphatic channel were removed. Yes   All significantly radioactive nodes were removed. Yes   All palpably suspicious nodes were removed. N/A   Biopsy-proven positive nodes marked with clips prior to chemotherapy were identified and removed.  N/A

## 2023-07-28 ENCOUNTER — TELEPHONE (OUTPATIENT)
Dept: HEMATOLOGY/ONCOLOGY | Facility: HOSPITAL | Age: 78
End: 2023-07-28

## 2023-07-28 NOTE — TELEPHONE ENCOUNTER
Called patient, post op day #1. States that overall doing well, pain is well controlled. She has not looked at incisions and plans to shower tomorrow. Managing drains well and documenting output on drain log. Scheduled her surgical follow up appointment. Phone number provided and encouraged patient to call back with any questions or concerns.
Closed head injury

## 2023-08-03 ENCOUNTER — NURSE NAVIGATOR ENCOUNTER (OUTPATIENT)
Dept: HEMATOLOGY/ONCOLOGY | Facility: HOSPITAL | Age: 78
End: 2023-08-03

## 2023-08-03 NOTE — PROGRESS NOTES
Patient called inquiring about her drain color from her mastectomy. She stated she was informed from the pre op mastectomy class that the color in the drain and tubing will change color from red to pink and hers has not. I stated that it will eventually. She stated her output is about 25-30 ml in 24 hours and she has an appointment on Monday to see the PA. Answered patient's questions to the best of my ability and referred her to Dr. Lucille Altamirano office for further assistance, if needed. She thanked me for the assistance and Pt was provided with the breast nurse navigators contact information and was encouraged to phone with any other questions or concerns.

## 2023-08-07 ENCOUNTER — OFFICE VISIT (OUTPATIENT)
Facility: LOCATION | Age: 78
End: 2023-08-07

## 2023-08-07 VITALS — HEART RATE: 73 BPM | TEMPERATURE: 97 F

## 2023-08-07 DIAGNOSIS — Z90.12 STATUS POST MASTECTOMY, LEFT: Primary | ICD-10-CM

## 2023-08-07 DIAGNOSIS — Z98.890 POST-OPERATIVE STATE: ICD-10-CM

## 2023-08-08 ENCOUNTER — OFFICE VISIT (OUTPATIENT)
Dept: HEMATOLOGY/ONCOLOGY | Facility: HOSPITAL | Age: 78
End: 2023-08-08
Attending: SURGERY
Payer: COMMERCIAL

## 2023-08-08 VITALS
DIASTOLIC BLOOD PRESSURE: 77 MMHG | HEART RATE: 66 BPM | SYSTOLIC BLOOD PRESSURE: 151 MMHG | WEIGHT: 169.81 LBS | HEIGHT: 64.49 IN | RESPIRATION RATE: 18 BRPM | OXYGEN SATURATION: 97 % | BODY MASS INDEX: 28.64 KG/M2 | TEMPERATURE: 98 F

## 2023-08-08 DIAGNOSIS — C50.412 MALIGNANT NEOPLASM OF UPPER-OUTER QUADRANT OF LEFT BREAST IN FEMALE, ESTROGEN RECEPTOR NEGATIVE: Primary | ICD-10-CM

## 2023-08-08 DIAGNOSIS — C50.812 MALIGNANT NEOPLASM OF OVERLAPPING SITES OF LEFT BREAST IN FEMALE, ESTROGEN RECEPTOR NEGATIVE: Primary | ICD-10-CM

## 2023-08-08 DIAGNOSIS — D64.89 ANEMIA DUE TO OTHER CAUSE, NOT CLASSIFIED: ICD-10-CM

## 2023-08-08 DIAGNOSIS — I42.7 CARDIOTOXICITY (HCC): ICD-10-CM

## 2023-08-08 DIAGNOSIS — T45.1X5D ADVERSE EFFECT OF CHEMOTHERAPY, SUBSEQUENT ENCOUNTER: ICD-10-CM

## 2023-08-08 DIAGNOSIS — Z17.1 MALIGNANT NEOPLASM OF OVERLAPPING SITES OF LEFT BREAST IN FEMALE, ESTROGEN RECEPTOR NEGATIVE: Primary | ICD-10-CM

## 2023-08-08 DIAGNOSIS — Z17.1 MALIGNANT NEOPLASM OF UPPER-OUTER QUADRANT OF LEFT BREAST IN FEMALE, ESTROGEN RECEPTOR NEGATIVE: Primary | ICD-10-CM

## 2023-08-08 LAB
ALBUMIN SERPL-MCNC: 3.1 G/DL (ref 3.4–5)
ALBUMIN/GLOB SERPL: 0.9 {RATIO} (ref 1–2)
ALP LIVER SERPL-CCNC: 89 U/L
ALT SERPL-CCNC: 30 U/L
ANION GAP SERPL CALC-SCNC: 3 MMOL/L (ref 0–18)
AST SERPL-CCNC: 19 U/L (ref 15–37)
BASOPHILS # BLD AUTO: 0.02 X10(3) UL (ref 0–0.2)
BASOPHILS NFR BLD AUTO: 0.3 %
BILIRUB SERPL-MCNC: 0.4 MG/DL (ref 0.1–2)
BUN BLD-MCNC: 16 MG/DL (ref 7–18)
CALCIUM BLD-MCNC: 9.1 MG/DL (ref 8.5–10.1)
CHLORIDE SERPL-SCNC: 104 MMOL/L (ref 98–112)
CO2 SERPL-SCNC: 30 MMOL/L (ref 21–32)
CREAT BLD-MCNC: 0.8 MG/DL
EGFRCR SERPLBLD CKD-EPI 2021: 75 ML/MIN/1.73M2 (ref 60–?)
EOSINOPHIL # BLD AUTO: 0.25 X10(3) UL (ref 0–0.7)
EOSINOPHIL NFR BLD AUTO: 3.2 %
ERYTHROCYTE [DISTWIDTH] IN BLOOD BY AUTOMATED COUNT: 15 %
GLOBULIN PLAS-MCNC: 3.6 G/DL (ref 2.8–4.4)
GLUCOSE BLD-MCNC: 123 MG/DL (ref 70–99)
HCT VFR BLD AUTO: 28.8 %
HGB BLD-MCNC: 9.4 G/DL
IMM GRANULOCYTES # BLD AUTO: 0.05 X10(3) UL (ref 0–1)
IMM GRANULOCYTES NFR BLD: 0.6 %
LYMPHOCYTES # BLD AUTO: 1.48 X10(3) UL (ref 1–4)
LYMPHOCYTES NFR BLD AUTO: 19.2 %
MCH RBC QN AUTO: 27.5 PG (ref 26–34)
MCHC RBC AUTO-ENTMCNC: 32.6 G/DL (ref 31–37)
MCV RBC AUTO: 84.2 FL
MONOCYTES # BLD AUTO: 0.5 X10(3) UL (ref 0.1–1)
MONOCYTES NFR BLD AUTO: 6.5 %
NEUTROPHILS # BLD AUTO: 5.4 X10 (3) UL (ref 1.5–7.7)
NEUTROPHILS # BLD AUTO: 5.4 X10(3) UL (ref 1.5–7.7)
NEUTROPHILS NFR BLD AUTO: 70.2 %
OSMOLALITY SERPL CALC.SUM OF ELEC: 287 MOSM/KG (ref 275–295)
PLATELET # BLD AUTO: 270 10(3)UL (ref 150–450)
POTASSIUM SERPL-SCNC: 3.7 MMOL/L (ref 3.5–5.1)
PROT SERPL-MCNC: 6.7 G/DL (ref 6.4–8.2)
RBC # BLD AUTO: 3.42 X10(6)UL
SODIUM SERPL-SCNC: 137 MMOL/L (ref 136–145)
WBC # BLD AUTO: 7.7 X10(3) UL (ref 4–11)

## 2023-08-08 PROCEDURE — 85025 COMPLETE CBC W/AUTO DIFF WBC: CPT

## 2023-08-08 PROCEDURE — 96375 TX/PRO/DX INJ NEW DRUG ADDON: CPT

## 2023-08-08 PROCEDURE — 99215 OFFICE O/P EST HI 40 MIN: CPT | Performed by: INTERNAL MEDICINE

## 2023-08-08 PROCEDURE — 96413 CHEMO IV INFUSION 1 HR: CPT

## 2023-08-08 PROCEDURE — 80053 COMPREHEN METABOLIC PANEL: CPT

## 2023-08-08 NOTE — PROGRESS NOTES
Education Record    Learner:  Patient    Disease / [address] breast cancer  OTV Traztuzumab  iron  Barriers / Limitations:  None   Comments:    Method:  Discussion   Comments:    General Topics:  Plan of care reviewed   Comments:    Outcome:  Shows understanding   Comments:   Left mastectomy with SLN biopsy 7/23/23.   1 drain still in place. Seeing surgery office again on Thursday to address output on Thursday. Dr Rosa Ivey on 8/15. Pt feeling pretty good. Energy is good. Appetite is good. Able to do most of her normal activities.

## 2023-08-08 NOTE — PROGRESS NOTES
Pt here for C8D1 transtuzumab. Arrives Ambulating independently, accompanied by Spouse       Oral medications included in this regimen:  no    Patient confirms comprehension of cancer treatment schedule:  yes    Pregnancy screening:  Not applicable    Modifications in dose or schedule:  No    Medications appearance and physical integrity checked by RN. Chemotherapy IV pump settings verified by 2 RNs:  yes     Frequency of blood return and site check throughout administration: Prior to administration, Prior to each drug, and At completion of therapy     Infusion/treatment outcome:  patient tolerated treatment without incident    Education Record    Learner:  Patient and Spouse  Barriers / Limitations:  None  Method:  Discussion  Education / instructions given:  plan of care, next appts  Outcome:  Shows understanding    Discharged Home, Ambulating independently, accompanied by:Spouse    Patient/family verbalized understanding of future appointments: by [employer] messaging    Venofer given today prior to treatment, observed for 30 mins, pt tolerated well. Next appts scheduled. Pt discharged in stable condition.

## 2023-08-10 ENCOUNTER — OFFICE VISIT (OUTPATIENT)
Facility: LOCATION | Age: 78
End: 2023-08-10

## 2023-08-10 VITALS — HEART RATE: 78 BPM | TEMPERATURE: 97 F

## 2023-08-10 DIAGNOSIS — Z98.890 POSTOPERATIVE STATE: Primary | ICD-10-CM

## 2023-08-10 PROCEDURE — 99024 POSTOP FOLLOW-UP VISIT: CPT

## 2023-08-15 ENCOUNTER — OFFICE VISIT (OUTPATIENT)
Dept: HEMATOLOGY/ONCOLOGY | Facility: HOSPITAL | Age: 78
End: 2023-08-15
Attending: SURGERY
Payer: COMMERCIAL

## 2023-08-15 ENCOUNTER — OFFICE VISIT (OUTPATIENT)
Facility: LOCATION | Age: 78
End: 2023-08-15

## 2023-08-15 VITALS
OXYGEN SATURATION: 98 % | HEIGHT: 64.49 IN | SYSTOLIC BLOOD PRESSURE: 135 MMHG | TEMPERATURE: 98 F | WEIGHT: 169 LBS | HEART RATE: 74 BPM | RESPIRATION RATE: 18 BRPM | DIASTOLIC BLOOD PRESSURE: 80 MMHG | BODY MASS INDEX: 28.5 KG/M2

## 2023-08-15 DIAGNOSIS — D05.12 DUCTAL CARCINOMA IN SITU (DCIS) OF LEFT BREAST: ICD-10-CM

## 2023-08-15 DIAGNOSIS — C50.412 MALIGNANT NEOPLASM OF UPPER-OUTER QUADRANT OF LEFT BREAST IN FEMALE, ESTROGEN RECEPTOR NEGATIVE: Primary | ICD-10-CM

## 2023-08-15 DIAGNOSIS — Z17.1 MALIGNANT NEOPLASM OF UPPER-OUTER QUADRANT OF LEFT BREAST IN FEMALE, ESTROGEN RECEPTOR NEGATIVE: Primary | ICD-10-CM

## 2023-08-15 PROCEDURE — 99024 POSTOP FOLLOW-UP VISIT: CPT | Performed by: SURGERY

## 2023-08-15 PROCEDURE — 99211 OFF/OP EST MAY X REQ PHY/QHP: CPT

## 2023-08-15 RX ORDER — CARVEDILOL 6.25 MG/1
6.25 TABLET ORAL 2 TIMES DAILY PRN
COMMUNITY

## 2023-08-19 NOTE — PROGRESS NOTES
Subjective:   Patient ID: Thiago Blake is a 77year old female who underwent left breast mastectomy with sentinel lymph node biopsy on July 27, 2023. At the time of surgery, the patient had triple negative invasive ductal carcinoma of the upper outer quadrant and DCIS of the lower outer quadrant. The patient presents for follow-up. She has minimal incisional complaints. She is gradually increasing her activity and using her left arm. Her pain is well controlled. She is off all narcotic pain medicine. Her drain was removed August 10, 2023. HPI    History/Other:   Review of Systems  Current Outpatient Medications   Medication Sig Dispense Refill    carvedilol 6.25 MG Oral Tab Take 1 tablet (6.25 mg total) by mouth 2 (two) times daily as needed. 1.5 tab at 8:30 am and 2 tab at 7:30 pm      aspirin 81 MG Oral Tab EC Take 1 tablet (81 mg total) by mouth daily.  0    HYDROcodone-acetaminophen (NORCO) 5-325 MG Oral Tab Take 1 tablet by mouth every 6 (six) hours as needed for Pain. (Patient not taking: Reported on 8/8/2023) 20 tablet 0    potassium chloride 20 MEQ Oral Tab CR Take 1 tablet (20 mEq total) by mouth daily. 30 tablet 1    atorvastatin 20 MG Oral Tab Take 1 tablet (20 mg total) by mouth nightly. prochlorperazine (COMPAZINE) 10 mg tablet Take 1 tablet (10 mg total) by mouth every 6 (six) hours as needed for Nausea. (Patient not taking: Reported on 8/15/2023) 30 tablet 3    ondansetron (ZOFRAN) 8 MG tablet Take 1 tablet (8 mg total) by mouth every 8 (eight) hours as needed for Nausea. (Patient not taking: Reported on 8/15/2023) 30 tablet 3    hydrochlorothiazide 25 MG Oral Tab Take 1 tablet (25 mg total) by mouth daily. Hold if systolic BP is below 429  0    Losartan Potassium 25 MG Oral Tab Take 1.5 tablets (37.5 mg total) by mouth nightly.  Hold if systolic BP is below 329  1.5 tablets at HS 30 tablet 11    Acetaminophen 500 MG Oral Cap Take 1-2 capsules (500-1,000 mg total) by mouth every 6 (six) hours as needed for Pain. 60 capsule 2    Levothyroxine Sodium 75 MCG Oral Tab Take 1 tablet (75 mcg total) by mouth before breakfast.      ergocalciferol 09600 UNITS Oral Cap Take 1 capsule (50,000 Units total) by mouth once a week.  0    Multiple Vitamins-Minerals (MULTIVITAMIN WITH MINERALS) Oral Tab Take 1 tablet by mouth daily. Allergies:  Fosamax [Alendronat*    DIZZINESS    Objective:   Physical Exam  Vitals and nursing note reviewed. Constitutional:       General: She is not in acute distress. Appearance: She is well-developed. She is not diaphoretic. HENT:      Head: Normocephalic and atraumatic. Eyes:      General: No scleral icterus. Conjunctiva/sclera: Conjunctivae normal.      Pupils: Pupils are equal, round, and reactive to light. Neck:      Vascular: No JVD. Trachea: Trachea normal.   Chest:      Chest wall: No mass. Breasts:     Left: No inverted nipple, mass, nipple discharge, skin change or tenderness. Comments: Left chest wall incision healing well. There is mild fullness along the medial aspect. Musculoskeletal:      Cervical back: Full passive range of motion without pain and neck supple. Neurological:      Mental Status: She is alert and oriented to person, place, and time. Psychiatric:         Speech: Speech normal.         Behavior: Behavior normal.         Assessment & Plan:   Malignant neoplasm of upper-outer quadrant of left breast in female, estrogen receptor negative   (primary encounter diagnosis)  Ductal carcinoma in situ (DCIS) of left breast      Imaging & Referrals:  MASTECTOMY BRA  PROSTHETIC BREAST MASTECTOMY FORM  [facility] KIMBERLY 2D+3D SCREENING RIGHT (CPT=77067-52/50875)    The patient will be due for a screening right breast mammogram in 6 months. The order was placed at today's visit. She was provided prescriptions for mastectomy bras and prostheses.   I asked her to follow-up with me after her next imaging for continued surveillance. I did offer a referral to Occupational Therapy to help with range of motion exercises. The patient declined.       Сергей Lockhart MD

## 2023-08-22 ENCOUNTER — PATIENT OUTREACH (OUTPATIENT)
Facility: LOCATION | Age: 78
End: 2023-08-22

## 2023-08-29 ENCOUNTER — OFFICE VISIT (OUTPATIENT)
Dept: HEMATOLOGY/ONCOLOGY | Facility: HOSPITAL | Age: 78
End: 2023-08-29
Attending: SURGERY
Payer: COMMERCIAL

## 2023-08-29 VITALS
WEIGHT: 169.81 LBS | HEIGHT: 64.49 IN | OXYGEN SATURATION: 98 % | TEMPERATURE: 98 F | RESPIRATION RATE: 18 BRPM | BODY MASS INDEX: 28.64 KG/M2 | DIASTOLIC BLOOD PRESSURE: 82 MMHG | HEART RATE: 60 BPM | SYSTOLIC BLOOD PRESSURE: 159 MMHG

## 2023-08-29 DIAGNOSIS — Z17.1 MALIGNANT NEOPLASM OF UPPER-OUTER QUADRANT OF LEFT BREAST IN FEMALE, ESTROGEN RECEPTOR NEGATIVE: Primary | ICD-10-CM

## 2023-08-29 DIAGNOSIS — C50.412 MALIGNANT NEOPLASM OF UPPER-OUTER QUADRANT OF LEFT BREAST IN FEMALE, ESTROGEN RECEPTOR NEGATIVE: Primary | ICD-10-CM

## 2023-08-29 DIAGNOSIS — D64.89 ANEMIA DUE TO OTHER CAUSE, NOT CLASSIFIED: ICD-10-CM

## 2023-08-29 DIAGNOSIS — T45.1X5D ADVERSE EFFECT OF CHEMOTHERAPY, SUBSEQUENT ENCOUNTER: ICD-10-CM

## 2023-08-29 LAB
ALBUMIN SERPL-MCNC: 3.2 G/DL (ref 3.4–5)
ALBUMIN/GLOB SERPL: 0.9 {RATIO} (ref 1–2)
ALP LIVER SERPL-CCNC: 87 U/L
ALT SERPL-CCNC: 31 U/L
ANION GAP SERPL CALC-SCNC: 4 MMOL/L (ref 0–18)
AST SERPL-CCNC: 17 U/L (ref 15–37)
BASOPHILS # BLD AUTO: 0.02 X10(3) UL (ref 0–0.2)
BASOPHILS NFR BLD AUTO: 0.3 %
BILIRUB SERPL-MCNC: 0.2 MG/DL (ref 0.1–2)
BUN BLD-MCNC: 17 MG/DL (ref 7–18)
CALCIUM BLD-MCNC: 9.4 MG/DL (ref 8.5–10.1)
CHLORIDE SERPL-SCNC: 107 MMOL/L (ref 98–112)
CO2 SERPL-SCNC: 28 MMOL/L (ref 21–32)
CREAT BLD-MCNC: 0.8 MG/DL
EGFRCR SERPLBLD CKD-EPI 2021: 75 ML/MIN/1.73M2 (ref 60–?)
EOSINOPHIL # BLD AUTO: 0.22 X10(3) UL (ref 0–0.7)
EOSINOPHIL NFR BLD AUTO: 3.6 %
ERYTHROCYTE [DISTWIDTH] IN BLOOD BY AUTOMATED COUNT: 14.5 %
FASTING STATUS PATIENT QL REPORTED: NO
GLOBULIN PLAS-MCNC: 3.5 G/DL (ref 2.8–4.4)
GLUCOSE BLD-MCNC: 112 MG/DL (ref 70–99)
HCT VFR BLD AUTO: 32.3 %
HGB BLD-MCNC: 10.3 G/DL
IMM GRANULOCYTES # BLD AUTO: 0.03 X10(3) UL (ref 0–1)
IMM GRANULOCYTES NFR BLD: 0.5 %
LYMPHOCYTES # BLD AUTO: 1.58 X10(3) UL (ref 1–4)
LYMPHOCYTES NFR BLD AUTO: 26.2 %
MCH RBC QN AUTO: 26.4 PG (ref 26–34)
MCHC RBC AUTO-ENTMCNC: 31.9 G/DL (ref 31–37)
MCV RBC AUTO: 82.8 FL
MONOCYTES # BLD AUTO: 0.45 X10(3) UL (ref 0.1–1)
MONOCYTES NFR BLD AUTO: 7.5 %
NEUTROPHILS # BLD AUTO: 3.73 X10 (3) UL (ref 1.5–7.7)
NEUTROPHILS # BLD AUTO: 3.73 X10(3) UL (ref 1.5–7.7)
NEUTROPHILS NFR BLD AUTO: 61.9 %
OSMOLALITY SERPL CALC.SUM OF ELEC: 290 MOSM/KG (ref 275–295)
PLATELET # BLD AUTO: 237 10(3)UL (ref 150–450)
POTASSIUM SERPL-SCNC: 3.6 MMOL/L (ref 3.5–5.1)
PROT SERPL-MCNC: 6.7 G/DL (ref 6.4–8.2)
RBC # BLD AUTO: 3.9 X10(6)UL
SODIUM SERPL-SCNC: 139 MMOL/L (ref 136–145)
WBC # BLD AUTO: 6 X10(3) UL (ref 4–11)

## 2023-08-29 PROCEDURE — 99214 OFFICE O/P EST MOD 30 MIN: CPT | Performed by: INTERNAL MEDICINE

## 2023-08-29 PROCEDURE — 96413 CHEMO IV INFUSION 1 HR: CPT

## 2023-08-29 PROCEDURE — 85025 COMPLETE CBC W/AUTO DIFF WBC: CPT | Performed by: INTERNAL MEDICINE

## 2023-08-29 PROCEDURE — 80053 COMPREHEN METABOLIC PANEL: CPT | Performed by: INTERNAL MEDICINE

## 2023-08-29 PROCEDURE — 96375 TX/PRO/DX INJ NEW DRUG ADDON: CPT

## 2023-09-06 ENCOUNTER — TELEPHONE (OUTPATIENT)
Dept: HEMATOLOGY/ONCOLOGY | Facility: HOSPITAL | Age: 78
End: 2023-09-06

## 2023-09-19 ENCOUNTER — OFFICE VISIT (OUTPATIENT)
Dept: HEMATOLOGY/ONCOLOGY | Facility: HOSPITAL | Age: 78
End: 2023-09-19
Attending: SURGERY
Payer: COMMERCIAL

## 2023-09-19 VITALS
HEART RATE: 69 BPM | HEIGHT: 64.49 IN | WEIGHT: 168.81 LBS | RESPIRATION RATE: 18 BRPM | TEMPERATURE: 97 F | BODY MASS INDEX: 28.47 KG/M2 | OXYGEN SATURATION: 98 % | SYSTOLIC BLOOD PRESSURE: 147 MMHG | DIASTOLIC BLOOD PRESSURE: 81 MMHG

## 2023-09-19 DIAGNOSIS — D64.89 ANEMIA DUE TO OTHER CAUSE, NOT CLASSIFIED: ICD-10-CM

## 2023-09-19 DIAGNOSIS — C50.412 MALIGNANT NEOPLASM OF UPPER-OUTER QUADRANT OF LEFT BREAST IN FEMALE, ESTROGEN RECEPTOR NEGATIVE: Primary | ICD-10-CM

## 2023-09-19 DIAGNOSIS — Z17.1 MALIGNANT NEOPLASM OF UPPER-OUTER QUADRANT OF LEFT BREAST IN FEMALE, ESTROGEN RECEPTOR NEGATIVE: Primary | ICD-10-CM

## 2023-09-19 PROCEDURE — 96413 CHEMO IV INFUSION 1 HR: CPT

## 2023-09-19 NOTE — PROGRESS NOTES
Pt here for C10D1 Trazimera. Arrives Ambulating independently, accompanied by Self and Spouse       Oral medications included in this regimen:  no    Patient confirms comprehension of cancer treatment schedule:  yes    Pregnancy screening:  Denies possibility of pregnancy    Modifications in dose or schedule:  No    Medications appearance and physical integrity checked by RN. Chemotherapy IV pump settings verified by 2 RNs:  yes     Frequency of blood return and site check throughout administration: Prior to administration     Infusion/treatment outcome:  patient tolerated treatment without incident    Education Record    Learner:  Patient  Barriers / Limitations:  None  Method:  Discussion  Education / instructions given:  Scheduling  Outcome:  Shows understanding    Discharged Home, Ambulating independently, accompanied by:Self    Patient/family verbalized understanding of future appointments: by [employer] messaging    Patient tolerated treatment without incident. PCP was changed - chart updated by Dr Joe Garcia. Left in stable condition.

## 2023-10-10 ENCOUNTER — OFFICE VISIT (OUTPATIENT)
Dept: HEMATOLOGY/ONCOLOGY | Facility: HOSPITAL | Age: 78
End: 2023-10-10
Attending: SURGERY
Payer: COMMERCIAL

## 2023-10-10 VITALS
SYSTOLIC BLOOD PRESSURE: 147 MMHG | HEART RATE: 74 BPM | BODY MASS INDEX: 28.33 KG/M2 | DIASTOLIC BLOOD PRESSURE: 79 MMHG | HEIGHT: 64.49 IN | RESPIRATION RATE: 18 BRPM | WEIGHT: 168 LBS | OXYGEN SATURATION: 99 % | TEMPERATURE: 98 F

## 2023-10-10 DIAGNOSIS — Z17.1 MALIGNANT NEOPLASM OF UPPER-OUTER QUADRANT OF LEFT BREAST IN FEMALE, ESTROGEN RECEPTOR NEGATIVE: Primary | ICD-10-CM

## 2023-10-10 DIAGNOSIS — C50.412 MALIGNANT NEOPLASM OF UPPER-OUTER QUADRANT OF LEFT BREAST IN FEMALE, ESTROGEN RECEPTOR NEGATIVE: Primary | ICD-10-CM

## 2023-10-10 DIAGNOSIS — D64.89 ANEMIA DUE TO OTHER CAUSE, NOT CLASSIFIED: ICD-10-CM

## 2023-10-10 DIAGNOSIS — R11.0 CHEMOTHERAPY-INDUCED NAUSEA: ICD-10-CM

## 2023-10-10 DIAGNOSIS — T45.1X5D ADVERSE EFFECT OF CHEMOTHERAPY, SUBSEQUENT ENCOUNTER: ICD-10-CM

## 2023-10-10 DIAGNOSIS — T45.1X5A CHEMOTHERAPY-INDUCED NAUSEA: ICD-10-CM

## 2023-10-10 DIAGNOSIS — E87.6 HYPOKALEMIA: ICD-10-CM

## 2023-10-10 LAB
ALBUMIN SERPL-MCNC: 3.2 G/DL (ref 3.4–5)
ALBUMIN/GLOB SERPL: 0.8 {RATIO} (ref 1–2)
ALP LIVER SERPL-CCNC: 90 U/L
ALT SERPL-CCNC: 32 U/L
ANION GAP SERPL CALC-SCNC: 7 MMOL/L (ref 0–18)
AST SERPL-CCNC: 22 U/L (ref 15–37)
BASOPHILS # BLD AUTO: 0.02 X10(3) UL (ref 0–0.2)
BASOPHILS NFR BLD AUTO: 0.3 %
BILIRUB SERPL-MCNC: 0.3 MG/DL (ref 0.1–2)
BUN BLD-MCNC: 19 MG/DL (ref 7–18)
CALCIUM BLD-MCNC: 9.4 MG/DL (ref 8.5–10.1)
CHLORIDE SERPL-SCNC: 106 MMOL/L (ref 98–112)
CO2 SERPL-SCNC: 28 MMOL/L (ref 21–32)
CREAT BLD-MCNC: 0.81 MG/DL
EGFRCR SERPLBLD CKD-EPI 2021: 74 ML/MIN/1.73M2 (ref 60–?)
EOSINOPHIL # BLD AUTO: 0.34 X10(3) UL (ref 0–0.7)
EOSINOPHIL NFR BLD AUTO: 5.4 %
ERYTHROCYTE [DISTWIDTH] IN BLOOD BY AUTOMATED COUNT: 14.4 %
GLOBULIN PLAS-MCNC: 3.8 G/DL (ref 2.8–4.4)
GLUCOSE BLD-MCNC: 108 MG/DL (ref 70–99)
HCT VFR BLD AUTO: 37.2 %
HGB BLD-MCNC: 12.1 G/DL
IMM GRANULOCYTES # BLD AUTO: 0.03 X10(3) UL (ref 0–1)
IMM GRANULOCYTES NFR BLD: 0.5 %
IRON SATN MFR SERPL: 18 %
IRON SERPL-MCNC: 64 UG/DL
LYMPHOCYTES # BLD AUTO: 1.78 X10(3) UL (ref 1–4)
LYMPHOCYTES NFR BLD AUTO: 28.3 %
MCH RBC QN AUTO: 26.5 PG (ref 26–34)
MCHC RBC AUTO-ENTMCNC: 32.5 G/DL (ref 31–37)
MCV RBC AUTO: 81.6 FL
MONOCYTES # BLD AUTO: 0.5 X10(3) UL (ref 0.1–1)
MONOCYTES NFR BLD AUTO: 7.9 %
NEUTROPHILS # BLD AUTO: 3.62 X10 (3) UL (ref 1.5–7.7)
NEUTROPHILS # BLD AUTO: 3.62 X10(3) UL (ref 1.5–7.7)
NEUTROPHILS NFR BLD AUTO: 57.6 %
OSMOLALITY SERPL CALC.SUM OF ELEC: 295 MOSM/KG (ref 275–295)
PLATELET # BLD AUTO: 221 10(3)UL (ref 150–450)
POTASSIUM SERPL-SCNC: 3.3 MMOL/L (ref 3.5–5.1)
PROT SERPL-MCNC: 7 G/DL (ref 6.4–8.2)
RBC # BLD AUTO: 4.56 X10(6)UL
SODIUM SERPL-SCNC: 141 MMOL/L (ref 136–145)
TIBC SERPL-MCNC: 359 UG/DL (ref 240–450)
TRANSFERRIN SERPL-MCNC: 241 MG/DL (ref 200–360)
WBC # BLD AUTO: 6.3 X10(3) UL (ref 4–11)

## 2023-10-10 PROCEDURE — 96413 CHEMO IV INFUSION 1 HR: CPT

## 2023-10-10 PROCEDURE — 83540 ASSAY OF IRON: CPT

## 2023-10-10 PROCEDURE — 80053 COMPREHEN METABOLIC PANEL: CPT

## 2023-10-10 PROCEDURE — 85025 COMPLETE CBC W/AUTO DIFF WBC: CPT

## 2023-10-10 PROCEDURE — 99215 OFFICE O/P EST HI 40 MIN: CPT | Performed by: INTERNAL MEDICINE

## 2023-10-10 PROCEDURE — 83550 IRON BINDING TEST: CPT

## 2023-10-10 RX ORDER — GLUCOSAMINE SULFATE 500 MG
1 CAPSULE ORAL DAILY
COMMUNITY

## 2023-10-10 NOTE — PROGRESS NOTES
Pt here for [address] Arrives Ambulating independently, accompanied by Spouse       Oral medications included in this regimen:  no    Patient confirms comprehension of cancer treatment schedule:  yes    Pregnancy screening:  Not applicable    Modifications in dose or schedule:  No.  MD aware of K+ level of 3.3. Will order something for patient to take at home, not treating during tx today. Pt and spouse aware of plan. Medications appearance and physical integrity checked by RN. Chemotherapy IV pump settings verified by 2 RNs:  no N/A    Frequency of blood return and site check throughout administration: Prior to administration and At completion of therapy     Infusion/treatment outcome:  patient tolerated treatment without incident    Education Record    Learner:  Patient and Spouse  Barriers / Limitations:  None  Method:  Discussion  Education / instructions given:   Today's regime  Outcome:  Shows understanding    Discharged Home, Ambulating independently, accompanied by: Spouse    Patient/family verbalized understanding of future appointments: by [facility] [employer]

## 2023-10-10 NOTE — PROGRESS NOTES
Education Record    Learner:  Patient/ spouse    Disease / Diagnosis: her 2 + breast ca  OTV Trastuzumab    Barriers / Limitations:  None   Comments:    Method:  Discussion   Comments:    General Topics:  Plan of care reviewed   Comments:    Outcome:  Shows understanding   Comments:   Reports some occ left pain in the chest wall area. Appetite is good. Energy level is good.

## 2023-10-31 ENCOUNTER — OFFICE VISIT (OUTPATIENT)
Dept: HEMATOLOGY/ONCOLOGY | Facility: HOSPITAL | Age: 78
End: 2023-10-31
Attending: SURGERY

## 2023-10-31 VITALS
WEIGHT: 169 LBS | RESPIRATION RATE: 18 BRPM | SYSTOLIC BLOOD PRESSURE: 169 MMHG | HEIGHT: 64.49 IN | BODY MASS INDEX: 28.5 KG/M2 | TEMPERATURE: 98 F | HEART RATE: 91 BPM | OXYGEN SATURATION: 98 % | DIASTOLIC BLOOD PRESSURE: 84 MMHG

## 2023-10-31 DIAGNOSIS — Z17.1 MALIGNANT NEOPLASM OF UPPER-OUTER QUADRANT OF LEFT BREAST IN FEMALE, ESTROGEN RECEPTOR NEGATIVE: Primary | ICD-10-CM

## 2023-10-31 DIAGNOSIS — C50.412 MALIGNANT NEOPLASM OF UPPER-OUTER QUADRANT OF LEFT BREAST IN FEMALE, ESTROGEN RECEPTOR NEGATIVE: Primary | ICD-10-CM

## 2023-10-31 PROCEDURE — 96413 CHEMO IV INFUSION 1 HR: CPT

## 2023-10-31 NOTE — PROGRESS NOTES
Pt here for C12D1 Drug(s) Trazimera. Arrives Ambulating independently, accompanied by Spouse     Patient was evaluated today by Treatment Nurse. Oral medications included in this regimen:  no    Patient confirms comprehension of cancer treatment schedule:  yes    Pregnancy screening:  Not applicable    Modifications in dose or schedule:  No    Medications appearance and physical integrity checked by RN: yes. Chemotherapy IV pump settings verified by 2 RNs:  No due to targeted therapy IV administration. Frequency of blood return and site check throughout administration: Prior to administration and At completion of therapy     Infusion/treatment outcome:  patient tolerated treatment without incident    Education Record    Learner:  Patient and Spouse  Barriers / Limitations:  None  Method:  Discussion  Education / instructions given:   Today's Regime  Outcome:  Shows understanding    Discharged Home, Ambulating independently, accompanied by:Spouse    Patient/family verbalized understanding of future appointments: by [facility] [employer]

## 2023-11-21 ENCOUNTER — OFFICE VISIT (OUTPATIENT)
Dept: HEMATOLOGY/ONCOLOGY | Facility: HOSPITAL | Age: 78
End: 2023-11-21
Attending: SURGERY
Payer: COMMERCIAL

## 2023-11-21 VITALS
DIASTOLIC BLOOD PRESSURE: 75 MMHG | SYSTOLIC BLOOD PRESSURE: 155 MMHG | RESPIRATION RATE: 18 BRPM | TEMPERATURE: 98 F | HEART RATE: 67 BPM | WEIGHT: 170.63 LBS | OXYGEN SATURATION: 97 % | BODY MASS INDEX: 28.78 KG/M2 | HEIGHT: 64.49 IN

## 2023-11-21 DIAGNOSIS — C50.412 MALIGNANT NEOPLASM OF UPPER-OUTER QUADRANT OF LEFT BREAST IN FEMALE, ESTROGEN RECEPTOR NEGATIVE: ICD-10-CM

## 2023-11-21 DIAGNOSIS — D64.89 ANEMIA DUE TO OTHER CAUSE, NOT CLASSIFIED: ICD-10-CM

## 2023-11-21 DIAGNOSIS — D64.89 ANEMIA DUE TO OTHER CAUSE, NOT CLASSIFIED: Primary | ICD-10-CM

## 2023-11-21 DIAGNOSIS — Z17.1 MALIGNANT NEOPLASM OF UPPER-OUTER QUADRANT OF LEFT BREAST IN FEMALE, ESTROGEN RECEPTOR NEGATIVE: Primary | ICD-10-CM

## 2023-11-21 DIAGNOSIS — Z17.1 MALIGNANT NEOPLASM OF UPPER-OUTER QUADRANT OF LEFT BREAST IN FEMALE, ESTROGEN RECEPTOR NEGATIVE: ICD-10-CM

## 2023-11-21 DIAGNOSIS — C50.412 MALIGNANT NEOPLASM OF UPPER-OUTER QUADRANT OF LEFT BREAST IN FEMALE, ESTROGEN RECEPTOR NEGATIVE: Primary | ICD-10-CM

## 2023-11-21 DIAGNOSIS — T45.1X5D ADVERSE EFFECT OF CHEMOTHERAPY, SUBSEQUENT ENCOUNTER: ICD-10-CM

## 2023-11-21 LAB
ALBUMIN SERPL-MCNC: 3.2 G/DL (ref 3.4–5)
ALBUMIN/GLOB SERPL: 0.8 {RATIO} (ref 1–2)
ALP LIVER SERPL-CCNC: 99 U/L
ALT SERPL-CCNC: 30 U/L
ANION GAP SERPL CALC-SCNC: 7 MMOL/L (ref 0–18)
AST SERPL-CCNC: 21 U/L (ref 15–37)
BASOPHILS # BLD AUTO: 0.02 X10(3) UL (ref 0–0.2)
BASOPHILS NFR BLD AUTO: 0.3 %
BILIRUB SERPL-MCNC: 0.5 MG/DL (ref 0.1–2)
BUN BLD-MCNC: 22 MG/DL (ref 9–23)
CALCIUM BLD-MCNC: 9.3 MG/DL (ref 8.5–10.1)
CHLORIDE SERPL-SCNC: 105 MMOL/L (ref 98–112)
CO2 SERPL-SCNC: 28 MMOL/L (ref 21–32)
CREAT BLD-MCNC: 0.72 MG/DL
DEPRECATED HBV CORE AB SER IA-ACNC: 131.4 NG/ML
EGFRCR SERPLBLD CKD-EPI 2021: 85 ML/MIN/1.73M2 (ref 60–?)
EOSINOPHIL # BLD AUTO: 0.21 X10(3) UL (ref 0–0.7)
EOSINOPHIL NFR BLD AUTO: 2.7 %
ERYTHROCYTE [DISTWIDTH] IN BLOOD BY AUTOMATED COUNT: 14.6 %
FASTING STATUS PATIENT QL REPORTED: NO
GLOBULIN PLAS-MCNC: 3.8 G/DL (ref 2.8–4.4)
GLUCOSE BLD-MCNC: 103 MG/DL (ref 70–99)
HCT VFR BLD AUTO: 35.4 %
HGB BLD-MCNC: 11.5 G/DL
IMM GRANULOCYTES # BLD AUTO: 0.04 X10(3) UL (ref 0–1)
IMM GRANULOCYTES NFR BLD: 0.5 %
IRON SATN MFR SERPL: 14 %
IRON SERPL-MCNC: 50 UG/DL
LYMPHOCYTES # BLD AUTO: 1.98 X10(3) UL (ref 1–4)
LYMPHOCYTES NFR BLD AUTO: 25.9 %
MCH RBC QN AUTO: 26.7 PG (ref 26–34)
MCHC RBC AUTO-ENTMCNC: 32.5 G/DL (ref 31–37)
MCV RBC AUTO: 82.1 FL
MONOCYTES # BLD AUTO: 0.66 X10(3) UL (ref 0.1–1)
MONOCYTES NFR BLD AUTO: 8.6 %
NEUTROPHILS # BLD AUTO: 4.73 X10 (3) UL (ref 1.5–7.7)
NEUTROPHILS # BLD AUTO: 4.73 X10(3) UL (ref 1.5–7.7)
NEUTROPHILS NFR BLD AUTO: 62 %
OSMOLALITY SERPL CALC.SUM OF ELEC: 294 MOSM/KG (ref 275–295)
PLATELET # BLD AUTO: 226 10(3)UL (ref 150–450)
POTASSIUM SERPL-SCNC: 3.4 MMOL/L (ref 3.5–5.1)
PROT SERPL-MCNC: 7 G/DL (ref 6.4–8.2)
RBC # BLD AUTO: 4.31 X10(6)UL
SODIUM SERPL-SCNC: 140 MMOL/L (ref 136–145)
TIBC SERPL-MCNC: 358 UG/DL (ref 240–450)
TRANSFERRIN SERPL-MCNC: 240 MG/DL (ref 200–360)
WBC # BLD AUTO: 7.6 X10(3) UL (ref 4–11)

## 2023-11-21 PROCEDURE — 83550 IRON BINDING TEST: CPT

## 2023-11-21 PROCEDURE — 83540 ASSAY OF IRON: CPT

## 2023-11-21 PROCEDURE — 96413 CHEMO IV INFUSION 1 HR: CPT

## 2023-11-21 PROCEDURE — 99215 OFFICE O/P EST HI 40 MIN: CPT | Performed by: INTERNAL MEDICINE

## 2023-11-21 PROCEDURE — 82728 ASSAY OF FERRITIN: CPT

## 2023-11-21 PROCEDURE — 80053 COMPREHEN METABOLIC PANEL: CPT

## 2023-11-21 PROCEDURE — 85025 COMPLETE CBC W/AUTO DIFF WBC: CPT

## 2023-11-21 NOTE — PROGRESS NOTES
Pt here for C13D1 Drug(s)Herceptin. Arrives Ambulating independently, accompanied by Spouse     Patient was evaluated today by MD.    Oral medications included in this regimen:  no    Patient confirms comprehension of cancer treatment schedule:  yes    Pregnancy screening:  Not applicable    Modifications in dose or schedule:  No    Medications appearance and physical integrity checked by RN: yes. Chemotherapy IV pump settings verified by 2 RNs:  No due to targeted therapy IV administration. Frequency of blood return and site check throughout administration: Prior to administration and At completion of therapy     Infusion/treatment outcome:  patient tolerated treatment without incident    Education Record    Learner:  Patient and Spouse  Barriers / Limitations:  None  Method:  Brief focused  Education / instructions given: Instructed patient that md has added iron infusion. States she'd like it with next treatment.   Outcome:  Shows understanding    Discharged Home, Ambulating independently, accompanied by:Spouse    Patient/family verbalized understanding of future appointments: by [facility] [employer]

## 2023-12-11 ENCOUNTER — OFFICE VISIT (OUTPATIENT)
Dept: HEMATOLOGY/ONCOLOGY | Facility: HOSPITAL | Age: 78
End: 2023-12-11
Attending: SURGERY
Payer: COMMERCIAL

## 2023-12-11 VITALS
WEIGHT: 169.38 LBS | BODY MASS INDEX: 28.57 KG/M2 | TEMPERATURE: 98 F | OXYGEN SATURATION: 98 % | RESPIRATION RATE: 18 BRPM | HEART RATE: 57 BPM | HEIGHT: 64.49 IN | SYSTOLIC BLOOD PRESSURE: 151 MMHG | DIASTOLIC BLOOD PRESSURE: 84 MMHG

## 2023-12-11 DIAGNOSIS — Z17.1 MALIGNANT NEOPLASM OF UPPER-OUTER QUADRANT OF LEFT BREAST IN FEMALE, ESTROGEN RECEPTOR NEGATIVE: Primary | ICD-10-CM

## 2023-12-11 DIAGNOSIS — C50.412 MALIGNANT NEOPLASM OF UPPER-OUTER QUADRANT OF LEFT BREAST IN FEMALE, ESTROGEN RECEPTOR NEGATIVE: Primary | ICD-10-CM

## 2023-12-11 DIAGNOSIS — D64.89 ANEMIA DUE TO OTHER CAUSE, NOT CLASSIFIED: ICD-10-CM

## 2023-12-11 PROCEDURE — 96413 CHEMO IV INFUSION 1 HR: CPT

## 2023-12-11 PROCEDURE — 96375 TX/PRO/DX INJ NEW DRUG ADDON: CPT

## 2023-12-11 NOTE — PROGRESS NOTES
Pt here for C14D1 Drug(s)trastuzumab. Arrives Ambulating independently, accompanied by Self and Spouse     Patient was evaluated today by Treatment Nurse. Oral medications included in this regimen:  no    Patient confirms comprehension of cancer treatment schedule:  yes    Pregnancy screening:  Denies possibility of pregnancy    Modifications in dose or schedule:  No    Medications appearance and physical integrity checked by RN: yes. Chemotherapy IV pump settings verified by 2 RNs:  No due to targeted therapy IV administration. Frequency of blood return and site check throughout administration: Prior to administration and At completion of therapy     Infusion/treatment outcome:  patient tolerated treatment without incident    Education Record    Learner:  Patient and Spouse  Barriers / Limitations:  None  Method:  Discussion  Education / instructions given:  appts, medications administered  Outcome:  Shows understanding    Discharged Home, Ambulating independently, accompanied by:Self and Spouse    Patient/family verbalized understanding of future appointments: by [employer] messaging    Patient monitored for 30 min post venofer.  VSS

## 2023-12-21 ENCOUNTER — HOSPITAL ENCOUNTER (OUTPATIENT)
Dept: MAMMOGRAPHY | Facility: HOSPITAL | Age: 78
Discharge: HOME | End: 2023-12-21
Attending: SURGERY
Payer: COMMERCIAL

## 2023-12-21 DIAGNOSIS — Z17.1 MALIGNANT NEOPLASM OF UPPER-OUTER QUADRANT OF LEFT BREAST IN FEMALE, ESTROGEN RECEPTOR NEGATIVE  (HCC): ICD-10-CM

## 2023-12-21 DIAGNOSIS — D05.12 DUCTAL CARCINOMA IN SITU (DCIS) OF LEFT BREAST: ICD-10-CM

## 2023-12-21 DIAGNOSIS — C50.412 MALIGNANT NEOPLASM OF UPPER-OUTER QUADRANT OF LEFT BREAST IN FEMALE, ESTROGEN RECEPTOR NEGATIVE  (HCC): ICD-10-CM

## 2023-12-21 PROCEDURE — 77063 BREAST TOMOSYNTHESIS BI: CPT | Performed by: SURGERY

## 2023-12-21 PROCEDURE — 77067 SCR MAMMO BI INCL CAD: CPT | Performed by: SURGERY

## 2023-12-26 ENCOUNTER — OFFICE VISIT (OUTPATIENT)
Dept: HEMATOLOGY/ONCOLOGY | Facility: HOSPITAL | Age: 78
End: 2023-12-26
Attending: SURGERY
Payer: COMMERCIAL

## 2023-12-26 ENCOUNTER — OFFICE VISIT (OUTPATIENT)
Facility: LOCATION | Age: 78
End: 2023-12-26
Payer: COMMERCIAL

## 2023-12-26 VITALS
BODY MASS INDEX: 28.57 KG/M2 | HEART RATE: 71 BPM | OXYGEN SATURATION: 98 % | SYSTOLIC BLOOD PRESSURE: 145 MMHG | HEIGHT: 64.49 IN | WEIGHT: 169.38 LBS | TEMPERATURE: 97 F | DIASTOLIC BLOOD PRESSURE: 78 MMHG

## 2023-12-26 DIAGNOSIS — C50.412 MALIGNANT NEOPLASM OF UPPER-OUTER QUADRANT OF LEFT BREAST IN FEMALE, ESTROGEN RECEPTOR NEGATIVE  (HCC): Primary | ICD-10-CM

## 2023-12-26 DIAGNOSIS — Z17.1 MALIGNANT NEOPLASM OF UPPER-OUTER QUADRANT OF LEFT BREAST IN FEMALE, ESTROGEN RECEPTOR NEGATIVE  (HCC): Primary | ICD-10-CM

## 2023-12-26 DIAGNOSIS — M79.622 LEFT AXILLARY PAIN: ICD-10-CM

## 2023-12-26 DIAGNOSIS — D05.12 DUCTAL CARCINOMA IN SITU (DCIS) OF LEFT BREAST: ICD-10-CM

## 2023-12-26 DIAGNOSIS — Z90.12 S/P MASTECTOMY, LEFT: ICD-10-CM

## 2023-12-26 PROCEDURE — 99213 OFFICE O/P EST LOW 20 MIN: CPT | Performed by: SURGERY

## 2023-12-26 NOTE — PROGRESS NOTES
Subjective:   Patient ID: Lizzie Doshi is a 77year old who underwent left breast mastectomy with sentinel lymph node biopsy on July 27, 2023. Her disease was ER negative, NH negative HER2 positive. She is receiving trastuzumab. She presents for 6-month follow-up. She has some shooting pains across her left chest wall. She has noticed no new masses. She does notice redundant skin at the lateral aspect of her scar. She also notices some left axillary pain. HPI    History/Other:   Review of Systems  Current Outpatient Medications   Medication Sig Dispense Refill    Glucosamine 500 MG Oral Cap Take 1 capsule by mouth daily. carvedilol 6.25 MG Oral Tab Take 1 tablet (6.25 mg total) by mouth 2 (two) times daily as needed. 1.5 tab at 8:30 am and 2 tab at 7:30 pm      aspirin 81 MG Oral Tab EC Take 1 tablet (81 mg total) by mouth daily. 0    atorvastatin 20 MG Oral Tab Take 1 tablet (20 mg total) by mouth nightly. prochlorperazine (COMPAZINE) 10 mg tablet Take 1 tablet (10 mg total) by mouth every 6 (six) hours as needed for Nausea. (Patient not taking: Reported on 12/26/2023) 30 tablet 3    ondansetron (ZOFRAN) 8 MG tablet Take 1 tablet (8 mg total) by mouth every 8 (eight) hours as needed for Nausea. (Patient not taking: Reported on 12/26/2023) 30 tablet 3    hydrochlorothiazide 25 MG Oral Tab Take 1 tablet (25 mg total) by mouth daily. Hold if systolic BP is below 311  0    Losartan Potassium 25 MG Oral Tab Take 1.5 tablets (37.5 mg total) by mouth nightly. Hold if systolic BP is below 075  1.5 tablets at HS 30 tablet 11    Acetaminophen 500 MG Oral Cap Take 1-2 capsules (500-1,000 mg total) by mouth every 6 (six) hours as needed for Pain.  60 capsule 2    Levothyroxine Sodium 75 MCG Oral Tab Take 1 tablet (75 mcg total) by mouth before breakfast.      ergocalciferol 71988 UNITS Oral Cap Take 1 capsule (50,000 Units total) by mouth once a week.  0    Multiple Vitamins-Minerals (MULTIVITAMIN WITH MINERALS) Oral Tab Take 1 tablet by mouth daily. Allergies: Allergies   Allergen Reactions    Fosamax [Alendronate Sodium] DIZZINESS       Objective:   Physical Exam  Vitals and nursing note reviewed. Constitutional:       General: She is not in acute distress. Appearance: She is well-developed. She is not diaphoretic. HENT:      Head: Normocephalic and atraumatic. Eyes:      General: No scleral icterus. Conjunctiva/sclera: Conjunctivae normal.      Pupils: Pupils are equal, round, and reactive to light. Neck:      Vascular: No JVD. Trachea: Trachea normal.   Chest:      Chest wall: No mass. Breasts:     Right: No inverted nipple, mass, nipple discharge, skin change or tenderness. Left: No mass, skin change or tenderness. Comments: Left chest wall incision well-healed. There is mild fullness just superior to the scar in the axilla. Point of pain, as marked. Musculoskeletal:      Cervical back: Full passive range of motion without pain and neck supple. Lymphadenopathy:      Upper Body:      Right upper body: No axillary or pectoral adenopathy. Left upper body: No axillary or pectoral adenopathy. Neurological:      Mental Status: She is alert and oriented to person, place, and time. Psychiatric:         Speech: Speech normal.         Behavior: Behavior normal.       MAMMOGRAM   I personally viewed the films and agree with the radiologist's interpretation. FINDINGS:  Scattered focal asymmetries are stable. Impression   CONCLUSION:       BI-RADS CATEGORY:    DIAGNOSTIC CATEGORY 2--BENIGN FINDING NO CHANGE FROM COMPARISON ASSESSMENT. RECOMMENDATIONS:    ROUTINE MAMMOGRAM AND CLINICAL EVALUATION IN 12 MONTHS. A letter explaining the results in lay terms has been sent to the patient. This exam was evaluated with a computer-aided device.   This patient's information has been entered into a reminder system with a target due date for the next mammogram.     LOCATION:  [place]           Dictated by (CST): Jaycee Alvaraod MD on 12/22/2023 at 9:43 AM         Assessment & Plan:   1. Malignant neoplasm of upper-outer quadrant of left breast in female, estrogen receptor negative  ([facility] [address]    2. Ductal carcinoma in situ (DCIS) of left breast    3. S/P mastectomy, left    4. Left axillary pain        Imaging & Referrals:  [facility]IMBERLY 2D+3D SCREENING RIGHT (CPT=77067-52/52112)  US AXILLARY LEFT SH(CPT=76882)    To evaluate the fullness and source of pain, I have asked the patient undergo a left axillary ultrasound. The order was placed today. I will notify her of the results. She should continue breast examinations every 6 months for surveillance purposes. She will be due for a screening right breast mammogram in December 2024. The order was placed at today's visit.     Timoteo Greenberg MD

## 2023-12-26 NOTE — PROGRESS NOTES
Patient is here for follow up for breast cancer. She has some tenderness and a hard feeling area at her surgical site. This is not pain. She had her right screening mammogram last week.      Education Record    Learner:  Patient and Spouse    Disease / Diagnosis: Breast cancer    Barriers / Limitations:  None   Comments:    Method:  Discussion   Comments:    General Topics:  Side effects and symptom management   Comments:    Outcome:  Shows understanding   Comments:

## 2023-12-28 ENCOUNTER — HOSPITAL ENCOUNTER (OUTPATIENT)
Dept: MAMMOGRAPHY | Facility: HOSPITAL | Age: 78
Discharge: HOME | End: 2023-12-28
Attending: SURGERY
Payer: COMMERCIAL

## 2023-12-28 DIAGNOSIS — Z90.12 S/P MASTECTOMY, LEFT: ICD-10-CM

## 2023-12-28 DIAGNOSIS — C50.412 MALIGNANT NEOPLASM OF UPPER-OUTER QUADRANT OF LEFT BREAST IN FEMALE, ESTROGEN RECEPTOR NEGATIVE  (HCC): ICD-10-CM

## 2023-12-28 DIAGNOSIS — M79.622 LEFT AXILLARY PAIN: ICD-10-CM

## 2023-12-28 DIAGNOSIS — Z17.1 MALIGNANT NEOPLASM OF UPPER-OUTER QUADRANT OF LEFT BREAST IN FEMALE, ESTROGEN RECEPTOR NEGATIVE  (HCC): ICD-10-CM

## 2023-12-28 DIAGNOSIS — D05.12 DUCTAL CARCINOMA IN SITU (DCIS) OF LEFT BREAST: ICD-10-CM

## 2023-12-28 PROCEDURE — 76882 US LMTD JT/FCL EVL NVASC XTR: CPT | Performed by: SURGERY

## 2023-12-29 ENCOUNTER — OFFICE VISIT (OUTPATIENT)
Dept: HEMATOLOGY/ONCOLOGY | Facility: HOSPITAL | Age: 78
End: 2023-12-29
Attending: SURGERY
Payer: COMMERCIAL

## 2023-12-29 ENCOUNTER — PATIENT OUTREACH (OUTPATIENT)
Facility: LOCATION | Age: 78
End: 2023-12-29

## 2023-12-29 ENCOUNTER — TELEPHONE (OUTPATIENT)
Dept: HEMATOLOGY/ONCOLOGY | Facility: HOSPITAL | Age: 78
End: 2023-12-29

## 2023-12-29 VITALS
BODY MASS INDEX: 28.4 KG/M2 | DIASTOLIC BLOOD PRESSURE: 78 MMHG | HEIGHT: 64.49 IN | WEIGHT: 168.38 LBS | OXYGEN SATURATION: 97 % | SYSTOLIC BLOOD PRESSURE: 173 MMHG | RESPIRATION RATE: 16 BRPM | TEMPERATURE: 97 F | HEART RATE: 66 BPM

## 2023-12-29 DIAGNOSIS — C50.412 MALIGNANT NEOPLASM OF UPPER-OUTER QUADRANT OF LEFT BREAST IN FEMALE, ESTROGEN RECEPTOR NEGATIVE  (HCC): Primary | ICD-10-CM

## 2023-12-29 DIAGNOSIS — Z17.1 MALIGNANT NEOPLASM OF UPPER-OUTER QUADRANT OF LEFT BREAST IN FEMALE, ESTROGEN RECEPTOR NEGATIVE  (HCC): Primary | ICD-10-CM

## 2023-12-29 DIAGNOSIS — D64.89 ANEMIA DUE TO OTHER CAUSE, NOT CLASSIFIED: ICD-10-CM

## 2023-12-29 DIAGNOSIS — T45.1X5D ADVERSE EFFECT OF CHEMOTHERAPY, SUBSEQUENT ENCOUNTER: ICD-10-CM

## 2023-12-29 LAB
ALBUMIN SERPL-MCNC: 3.2 G/DL (ref 3.4–5)
ALBUMIN/GLOB SERPL: 0.8 {RATIO} (ref 1–2)
ALP LIVER SERPL-CCNC: 98 U/L
ALT SERPL-CCNC: 27 U/L
ANION GAP SERPL CALC-SCNC: 3 MMOL/L (ref 0–18)
AST SERPL-CCNC: 18 U/L (ref 15–37)
BASOPHILS # BLD AUTO: 0.02 X10(3) UL (ref 0–0.2)
BASOPHILS NFR BLD AUTO: 0.3 %
BILIRUB SERPL-MCNC: 0.4 MG/DL (ref 0.1–2)
BUN BLD-MCNC: 21 MG/DL (ref 9–23)
CALCIUM BLD-MCNC: 9.2 MG/DL (ref 8.5–10.1)
CHLORIDE SERPL-SCNC: 106 MMOL/L (ref 98–112)
CO2 SERPL-SCNC: 30 MMOL/L (ref 21–32)
CREAT BLD-MCNC: 0.74 MG/DL
EGFRCR SERPLBLD CKD-EPI 2021: 82 ML/MIN/1.73M2 (ref 60–?)
EOSINOPHIL # BLD AUTO: 0.15 X10(3) UL (ref 0–0.7)
EOSINOPHIL NFR BLD AUTO: 2.1 %
ERYTHROCYTE [DISTWIDTH] IN BLOOD BY AUTOMATED COUNT: 15 %
FASTING STATUS PATIENT QL REPORTED: NO
GLOBULIN PLAS-MCNC: 3.8 G/DL (ref 2.8–4.4)
GLUCOSE BLD-MCNC: 104 MG/DL (ref 70–99)
HCT VFR BLD AUTO: 34 %
HGB BLD-MCNC: 11.1 G/DL
IMM GRANULOCYTES # BLD AUTO: 0.03 X10(3) UL (ref 0–1)
IMM GRANULOCYTES NFR BLD: 0.4 %
LYMPHOCYTES # BLD AUTO: 1.91 X10(3) UL (ref 1–4)
LYMPHOCYTES NFR BLD AUTO: 26.8 %
MCH RBC QN AUTO: 26.7 PG (ref 26–34)
MCHC RBC AUTO-ENTMCNC: 32.6 G/DL (ref 31–37)
MCV RBC AUTO: 81.9 FL
MONOCYTES # BLD AUTO: 0.49 X10(3) UL (ref 0.1–1)
MONOCYTES NFR BLD AUTO: 6.9 %
NEUTROPHILS # BLD AUTO: 4.53 X10 (3) UL (ref 1.5–7.7)
NEUTROPHILS # BLD AUTO: 4.53 X10(3) UL (ref 1.5–7.7)
NEUTROPHILS NFR BLD AUTO: 63.5 %
OSMOLALITY SERPL CALC.SUM OF ELEC: 291 MOSM/KG (ref 275–295)
PLATELET # BLD AUTO: 245 10(3)UL (ref 150–450)
POTASSIUM SERPL-SCNC: 3.5 MMOL/L (ref 3.5–5.1)
PROT SERPL-MCNC: 7 G/DL (ref 6.4–8.2)
RBC # BLD AUTO: 4.15 X10(6)UL
SODIUM SERPL-SCNC: 139 MMOL/L (ref 136–145)
WBC # BLD AUTO: 7.1 X10(3) UL (ref 4–11)

## 2023-12-29 PROCEDURE — 85025 COMPLETE CBC W/AUTO DIFF WBC: CPT

## 2023-12-29 PROCEDURE — 96413 CHEMO IV INFUSION 1 HR: CPT

## 2023-12-29 PROCEDURE — 99215 OFFICE O/P EST HI 40 MIN: CPT | Performed by: INTERNAL MEDICINE

## 2023-12-29 PROCEDURE — 80053 COMPREHEN METABOLIC PANEL: CPT

## 2023-12-29 PROCEDURE — 96375 TX/PRO/DX INJ NEW DRUG ADDON: CPT

## 2023-12-29 NOTE — PROGRESS NOTES
Pt here for C15D1 Drug(s) trastuzumab and venofer. Arrives Ambulating independently, accompanied by Spouse     Patient was evaluated today by MD.    Oral medications included in this regimen:  no    Patient confirms comprehension of cancer treatment schedule:  yes    Pregnancy screening:  Not applicable    Modifications in dose or schedule:  No    Medications appearance and physical integrity checked by RN: yes. Chemotherapy IV pump settings verified by 2 RNs:  No due to targeted therapy IV administration.   Frequency of blood return and site check throughout administration: Prior to administration, Prior to each drug, and At completion of therapy     Infusion/treatment outcome:  patient tolerated treatment without incident    Education Record    Learner:  Patient and Spouse  Barriers / Limitations:  None  Method:  Discussion  Education / instructions given:  plan of care, next appts  Outcome:  Shows understanding    Discharged Home, Ambulating independently, accompanied by:Spouse    Patient/family verbalized understanding of future appointments: by [facility]

## 2023-12-29 NOTE — PROGRESS NOTES
Education Record    Learner:  Patient/ spouse    Disease / Mindi [facility] breast Her 2 + cancer   OTV D1C15 Trazimera     Barriers / Limitations:  None   Comments:    Method:  Discussion   Comments:    General Topics:  Plan of care reviewed   Comments:    Outcome:  Shows understanding   Comments:   Feeling well. Energy level is good. Reviewed plan of care and remaining infusions.

## 2023-12-29 NOTE — TELEPHONE ENCOUNTER
Echo order faxed to Dr Ruy Escobar- duly office, as well as scheduling with [facility].    Dr Ruy Escobar fax [phone]

## 2024-01-16 NOTE — PAT NURSING NOTE
PreOp Instructions     You are scheduled for: an Interventional Radiology Procedure     Date of Procedure: 02/22/24. Check in at 1:00 pm     Diet Instructions: Do not eat or drink anything for 6 hours before the procedure. No food after 8:00 am     Medications: Medications you are allowed to take can be taken with a sip of water the morning of your procedure     Do not take the following Blood Thinner(s): LAST DOSE OF ASPIRIN ON 2/17     Skin Prep: Shower with antibacterial soap using a clean washcloth, prior to procedure     Driving After Procedure: If sedation is given, you WILL NOT be able to drive home. You will need a responsible adult  to drive you home.     Discharge Teaching: Your nurse will give you specific instructions before discharge, Most people can resume normal activities in 2-3 days, Any questions, please call the physician's office

## 2024-01-23 ENCOUNTER — OFFICE VISIT (OUTPATIENT)
Dept: HEMATOLOGY/ONCOLOGY | Facility: HOSPITAL | Age: 79
End: 2024-01-23
Attending: INTERNAL MEDICINE
Payer: COMMERCIAL

## 2024-01-23 VITALS
SYSTOLIC BLOOD PRESSURE: 163 MMHG | HEART RATE: 67 BPM | TEMPERATURE: 98 F | DIASTOLIC BLOOD PRESSURE: 71 MMHG | WEIGHT: 170.81 LBS | RESPIRATION RATE: 16 BRPM | OXYGEN SATURATION: 96 % | HEIGHT: 64.49 IN | BODY MASS INDEX: 28.81 KG/M2

## 2024-01-23 DIAGNOSIS — C50.412 MALIGNANT NEOPLASM OF UPPER-OUTER QUADRANT OF LEFT BREAST IN FEMALE, ESTROGEN RECEPTOR NEGATIVE  (HCC): Primary | ICD-10-CM

## 2024-01-23 DIAGNOSIS — D64.89 ANEMIA DUE TO OTHER CAUSE, NOT CLASSIFIED: ICD-10-CM

## 2024-01-23 DIAGNOSIS — Z17.1 MALIGNANT NEOPLASM OF UPPER-OUTER QUADRANT OF LEFT BREAST IN FEMALE, ESTROGEN RECEPTOR NEGATIVE  (HCC): Primary | ICD-10-CM

## 2024-01-23 PROCEDURE — 96375 TX/PRO/DX INJ NEW DRUG ADDON: CPT

## 2024-01-23 PROCEDURE — 96413 CHEMO IV INFUSION 1 HR: CPT

## 2024-01-23 NOTE — PROGRESS NOTES
Pt here for C16D1 Drug(s) Venofer/Trazimera.  Arrives Ambulating independently, accompanied by Spouse     Patient was evaluated today by Treatment Nurse.    Oral medications included in this regimen:  no    Patient confirms comprehension of cancer treatment schedule:  yes    Pregnancy screening:  Not applicable    Modifications in dose or schedule:  No    Medications appearance and physical integrity checked by RN: yes.    Chemotherapy IV pump settings verified by 2 RNs:  No due to targeted therapy IV administration.  Frequency of blood return and site check throughout administration: Prior to administration and At completion of therapy     Infusion/treatment outcome:  patient tolerated treatment without incident    Education Record    Learner:  Patient and Spouse  Barriers / Limitations:  None  Method:  Discussion  Education / instructions given:  Today's regime  Outcome:  Shows understanding    Discharged Home, Ambulating independently, accompanied by:Spouse    Patient/family verbalized understanding of future appointments: by MyChart messaging

## 2024-02-12 NOTE — PROGRESS NOTES
[facility] Progress Note      Patient Name:  Kelsi Sena  YOB: 1945  Medical Record Number:  [id number]    Date of visit: 2/13/2024    CHIEF COMPLAINT: HER2 + L breast carcinoma.    HPI:     78 year old that I have followed since 2/23. L biopsy 2/23-grade 2 IDC, ER/IA neg, HER2+, Ki-67 25-30%.  Clinical T1N0.  1.2 cm mass on mammogram, 0.5 cm length of tumor on core biopsy.  Received 12 doses of weekly paclitaxel/trastuzumab from 3/16/23-5/30/23.  Has been on trastuzumab since then.  Underwent left mastectomy/SLN biopsy 7/27/23.  Path-complete response.  Presents for trastuzumab.    Energy is improving.  Occasional instability.  Chronic and stable back pain.  No neuropathy.  No recent infections.    SOCIAL HISTORY:    . 2 sons-local. 5 grand kids-oldest 19.    ROS:     Constitutional: no fever, chills fatigue,night sweats or weight loss  Neurologic: no headache, seizures, diplopia or weakness  Respiratory: no cough, SOB or hemoptysis  Cardiovascular: no chest pain, ankle swelling, LUU  GI: no nausea, vomiting, diarrhea or BRBPR  Musculoskeletal: no body-ache or joint pain  Dermatologic: no alopecia, rash, pruritis  : no hematuria, dysuria or frequency  Psych: no confusion or depression   Heme: no easy bruising or bleeding     ALLERGIES:    Allergies   Allergen Reactions    Fosamax [Alendronate Sodium] DIZZINESS       MEDICATIONS:    Current Outpatient Medications   Medication Sig Dispense Refill    Glucosamine 500 MG Oral Cap Take 1 capsule by mouth daily.      carvedilol 6.25 MG Oral Tab Take 1 tablet (6.25 mg total) by mouth 2 (two) times daily as needed. 1.5 tab at 8:30 am and 2 tab at 7:30 pm      aspirin 81 MG Oral Tab EC Take 1 tablet (81 mg total) by mouth daily.  0    atorvastatin 20 MG Oral Tab Take 1 tablet (20 mg total) by mouth nightly.      prochlorperazine (COMPAZINE) 10 mg tablet Take 1 tablet (10 mg total) by mouth every 6 (six) hours as needed for Nausea. 30  tablet 3    ondansetron (ZOFRAN) 8 MG tablet Take 1 tablet (8 mg total) by mouth every 8 (eight) hours as needed for Nausea. 30 tablet 3    hydrochlorothiazide 25 MG Oral Tab Take 1 tablet (25 mg total) by mouth daily. Hold if systolic BP is below 120  0    Losartan Potassium 25 MG Oral Tab Take 1.5 tablets (37.5 mg total) by mouth nightly. Hold if systolic BP is below 120  1.5 tablets at HS 30 tablet 11    Acetaminophen 500 MG Oral Cap Take 1-2 capsules (500-1,000 mg total) by mouth every 6 (six) hours as needed for Pain. 60 capsule 2    Levothyroxine Sodium 75 MCG Oral Tab Take 1 tablet (75 mcg total) by mouth before breakfast.      ergocalciferol 32819 UNITS Oral Cap Take 1 capsule (50,000 Units total) by mouth once a week.  0    Multiple Vitamins-Minerals (MULTIVITAMIN WITH MINERALS) Oral Tab Take 1 tablet by mouth daily.      Meloxicam 15 MG Oral Tab Take 1 tablet (15 mg total) by mouth daily. (Patient not taking: Reported on 2024)         VITALS:  Height: 163.8 cm (5' 4.49\") (1102)  Weight: 77 kg (169 lb 12.8 oz) (1102)  BSA (Calculated - sq m): 1.83 sq meters (1102)  Pulse: 73 (1102)  BP: 146/80 (1102)  Temp: 98 °F (36.7 °C) (1102)  Do Not Use - Resp Rate: --  SpO2: 100 % (1102)    PS:  ECO    PHYSICAL EXAM:    General: alert and oriented x 3, not in acute distress.  Accompanied by .  HEENT: MOE.  CVS: S1S2, regular  Rhythm, no murmurs.   Lungs: Clear to auscultation and percussion.  Abdomen:   CNS: no focal deficit    Emotional well being: Patient's emotional well being was assessed.  No issues requiring acute psychosocial intervention were identified.     LABS:     Recent Results (from the past 24 hour(s))   Iron And Tibc    Collection Time: 24 11:04 AM   Result Value Ref Range    Iron 72 50 - 170 ug/dL    Transferrin 235 200 - 360 mg/dL    Total Iron Binding Capacity 350 240 - 450 ug/dL    % Saturation 21 15 - 50 %   Comp Metabolic Panel  (14)    Collection Time: 02/13/24 11:04 AM   Result Value Ref Range    Glucose 101 (H) 70 - 99 mg/dL    Sodium 139 136 - 145 mmol/L    Potassium 3.6 3.5 - 5.1 mmol/L    Chloride 105 98 - 112 mmol/L    CO2 31.0 21.0 - 32.0 mmol/L    Anion Gap 3 0 - 18 mmol/L    BUN 18 9 - 23 mg/dL    Creatinine 0.72 0.55 - 1.02 mg/dL    Calcium, Total 9.2 8.5 - 10.1 mg/dL    Calculated Osmolality 290 275 - 295 mOsm/kg    eGFR-Cr 85 >=60 mL/min/1.73m2    AST 29 15 - 37 U/L    ALT 34 13 - 56 U/L    Alkaline Phosphatase 96 55 - 142 U/L    Bilirubin, Total 0.3 0.1 - 2.0 mg/dL    Total Protein 7.0 6.4 - 8.2 g/dL    Albumin 3.3 (L) 3.4 - 5.0 g/dL    Globulin  3.7 2.8 - 4.4 g/dL    A/G Ratio 0.9 (L) 1.0 - 2.0    Patient Fasting for CMP? Patient not present    CBC W/ DIFFERENTIAL    Collection Time: 02/13/24 11:04 AM   Result Value Ref Range    WBC 6.8 4.0 - 11.0 x10(3) uL    RBC 4.11 3.80 - 5.30 x10(6)uL    HGB 11.3 (L) 12.0 - 16.0 g/dL    HCT 34.6 (L) 35.0 - 48.0 %    .0 150.0 - 450.0 10(3)uL    MCV 84.2 80.0 - 100.0 fL    MCH 27.5 26.0 - 34.0 pg    MCHC 32.7 31.0 - 37.0 g/dL    RDW 14.5 %    Neutrophil Absolute Prelim 4.18 1.50 - 7.70 x10 (3) uL    Neutrophil Absolute 4.18 1.50 - 7.70 x10(3) uL    Lymphocyte Absolute 1.85 1.00 - 4.00 x10(3) uL    Monocyte Absolute 0.53 0.10 - 1.00 x10(3) uL    Eosinophil Absolute 0.20 0.00 - 0.70 x10(3) uL    Basophil Absolute 0.02 0.00 - 0.20 x10(3) uL    Immature Granulocyte Absolute 0.05 0.00 - 1.00 x10(3) uL    Neutrophil % 61.2 %    Lymphocyte % 27.1 %    Monocyte % 7.8 %    Eosinophil % 2.9 %    Basophil % 0.3 %    Immature Granulocyte % 0.7 %           ASSESSMENT AND PLAN:     # Her 2 + L breast ca: clinical T1 N0. Diagnosed 2/23.  Biopsy-grade 2 IDC, ER/TX neg, HER+, Ki-67 25-30%.  Received neoadjuvant weekly paclitaxel/trastuzumab from 3/16/23-5/30/23.  S/p L mastectomy/SLN bx 7/27/23.  No residual carcinoma in breast, 2 tumor beds measuring 11 mm and 9 mm.  LN negative.    Not candidate  for RT or endocrine therapy.  Completing  trastuzumab today. Echo 10/23 EF 55%, next 1/24.  May get Port-A-Cath removed after February.    R Mammo 12/23 ok, next due 12/24.    # Iron defi anemia: Received a dose of IV iron 12/11/2023.  2 additional doses.    # Hypokalemia: Mild, increase dietary intake.      ORDERS PLACED:    Return in about 3 months (around 5/13/2024) for Labs, MD visit, Chemo.    Trang Soares M.D.    [facility]    [address] [place], [address]    2/13/2024

## 2024-02-13 ENCOUNTER — OFFICE VISIT (OUTPATIENT)
Dept: HEMATOLOGY/ONCOLOGY | Facility: HOSPITAL | Age: 79
End: 2024-02-13
Attending: INTERNAL MEDICINE
Payer: COMMERCIAL

## 2024-02-13 VITALS
HEIGHT: 64.49 IN | HEART RATE: 73 BPM | DIASTOLIC BLOOD PRESSURE: 80 MMHG | BODY MASS INDEX: 28.64 KG/M2 | SYSTOLIC BLOOD PRESSURE: 146 MMHG | TEMPERATURE: 98 F | RESPIRATION RATE: 18 BRPM | WEIGHT: 169.81 LBS | OXYGEN SATURATION: 100 %

## 2024-02-13 DIAGNOSIS — C50.412 MALIGNANT NEOPLASM OF UPPER-OUTER QUADRANT OF LEFT BREAST IN FEMALE, ESTROGEN RECEPTOR NEGATIVE  (HCC): Primary | ICD-10-CM

## 2024-02-13 DIAGNOSIS — R11.0 CHEMOTHERAPY-INDUCED NAUSEA: ICD-10-CM

## 2024-02-13 DIAGNOSIS — T45.1X5A CHEMOTHERAPY-INDUCED NAUSEA: ICD-10-CM

## 2024-02-13 DIAGNOSIS — D64.89 ANEMIA DUE TO OTHER CAUSE, NOT CLASSIFIED: ICD-10-CM

## 2024-02-13 DIAGNOSIS — Z17.1 MALIGNANT NEOPLASM OF UPPER-OUTER QUADRANT OF LEFT BREAST IN FEMALE, ESTROGEN RECEPTOR NEGATIVE  (HCC): Primary | ICD-10-CM

## 2024-02-13 DIAGNOSIS — T45.1X5D ADVERSE EFFECT OF CHEMOTHERAPY, SUBSEQUENT ENCOUNTER: ICD-10-CM

## 2024-02-13 DIAGNOSIS — E87.6 HYPOKALEMIA: ICD-10-CM

## 2024-02-13 LAB
ALBUMIN SERPL-MCNC: 3.3 G/DL (ref 3.4–5)
ALBUMIN/GLOB SERPL: 0.9 {RATIO} (ref 1–2)
ALP LIVER SERPL-CCNC: 96 U/L
ALT SERPL-CCNC: 34 U/L
ANION GAP SERPL CALC-SCNC: 3 MMOL/L (ref 0–18)
AST SERPL-CCNC: 29 U/L (ref 15–37)
BASOPHILS # BLD AUTO: 0.02 X10(3) UL (ref 0–0.2)
BASOPHILS NFR BLD AUTO: 0.3 %
BILIRUB SERPL-MCNC: 0.3 MG/DL (ref 0.1–2)
BUN BLD-MCNC: 18 MG/DL (ref 9–23)
CALCIUM BLD-MCNC: 9.2 MG/DL (ref 8.5–10.1)
CHLORIDE SERPL-SCNC: 105 MMOL/L (ref 98–112)
CO2 SERPL-SCNC: 31 MMOL/L (ref 21–32)
CREAT BLD-MCNC: 0.72 MG/DL
EGFRCR SERPLBLD CKD-EPI 2021: 85 ML/MIN/1.73M2 (ref 60–?)
EOSINOPHIL # BLD AUTO: 0.2 X10(3) UL (ref 0–0.7)
EOSINOPHIL NFR BLD AUTO: 2.9 %
ERYTHROCYTE [DISTWIDTH] IN BLOOD BY AUTOMATED COUNT: 14.5 %
GLOBULIN PLAS-MCNC: 3.7 G/DL (ref 2.8–4.4)
GLUCOSE BLD-MCNC: 101 MG/DL (ref 70–99)
HCT VFR BLD AUTO: 34.6 %
HGB BLD-MCNC: 11.3 G/DL
IMM GRANULOCYTES # BLD AUTO: 0.05 X10(3) UL (ref 0–1)
IMM GRANULOCYTES NFR BLD: 0.7 %
IRON SATN MFR SERPL: 21 %
IRON SERPL-MCNC: 72 UG/DL
LYMPHOCYTES # BLD AUTO: 1.85 X10(3) UL (ref 1–4)
LYMPHOCYTES NFR BLD AUTO: 27.1 %
MCH RBC QN AUTO: 27.5 PG (ref 26–34)
MCHC RBC AUTO-ENTMCNC: 32.7 G/DL (ref 31–37)
MCV RBC AUTO: 84.2 FL
MONOCYTES # BLD AUTO: 0.53 X10(3) UL (ref 0.1–1)
MONOCYTES NFR BLD AUTO: 7.8 %
NEUTROPHILS # BLD AUTO: 4.18 X10 (3) UL (ref 1.5–7.7)
NEUTROPHILS # BLD AUTO: 4.18 X10(3) UL (ref 1.5–7.7)
NEUTROPHILS NFR BLD AUTO: 61.2 %
OSMOLALITY SERPL CALC.SUM OF ELEC: 290 MOSM/KG (ref 275–295)
PLATELET # BLD AUTO: 222 10(3)UL (ref 150–450)
POTASSIUM SERPL-SCNC: 3.6 MMOL/L (ref 3.5–5.1)
PROT SERPL-MCNC: 7 G/DL (ref 6.4–8.2)
RBC # BLD AUTO: 4.11 X10(6)UL
SODIUM SERPL-SCNC: 139 MMOL/L (ref 136–145)
TIBC SERPL-MCNC: 350 UG/DL (ref 240–450)
TRANSFERRIN SERPL-MCNC: 235 MG/DL (ref 200–360)
WBC # BLD AUTO: 6.8 X10(3) UL (ref 4–11)

## 2024-02-13 PROCEDURE — 96375 TX/PRO/DX INJ NEW DRUG ADDON: CPT

## 2024-02-13 PROCEDURE — 83550 IRON BINDING TEST: CPT

## 2024-02-13 PROCEDURE — 96413 CHEMO IV INFUSION 1 HR: CPT

## 2024-02-13 PROCEDURE — 85025 COMPLETE CBC W/AUTO DIFF WBC: CPT

## 2024-02-13 PROCEDURE — 83540 ASSAY OF IRON: CPT

## 2024-02-13 PROCEDURE — 99215 OFFICE O/P EST HI 40 MIN: CPT | Performed by: INTERNAL MEDICINE

## 2024-02-13 PROCEDURE — 80053 COMPREHEN METABOLIC PANEL: CPT

## 2024-02-13 RX ORDER — MELOXICAM 15 MG/1
15 TABLET ORAL DAILY
COMMUNITY
Start: 2024-01-03

## 2024-02-13 NOTE — PROGRESS NOTES
[facility] Progress Note      Patient Name:  Kelsi Sena  YOB: 1945  Medical Record Number:  [id number]    Date of visit: 12/29/2023    CHIEF COMPLAINT: HER2 + L breast carcinoma.    HPI:     78 year old that I have followed since 2/23. L biopsy 2/23-grade 2 IDC, ER/NM neg, HER2+, Ki-67 25-30%.  Clinical T1N0.  1.2 cm mass on mammogram, 0.5 cm length of tumor on core biopsy.  Received 12 doses of weekly paclitaxel/trastuzumab from 3/16/23-5/30/23.  Has been on trastuzumab since then.  Underwent left mastectomy/SLN biopsy 7/27/23.  Path-complete response.  Presents for trastuzumab.    No new complaints.  Energy level has    SOCIAL HISTORY:    . 2 sons-local. 5 grand kids-oldest 19.    ROS:     Constitutional: no fever, chills fatigue,night sweats or weight loss  Neurologic: no headache, seizures, diplopia or weakness  Respiratory: no cough, SOB or hemoptysis  Cardiovascular: no chest pain, ankle swelling, LUU  GI: no nausea, vomiting, diarrhea or BRBPR  Musculoskeletal: no body-ache or joint pain  Dermatologic: no alopecia, rash, pruritis  : no hematuria, dysuria or frequency  Psych: no confusion or depression   Heme: no easy bruising or bleeding     ALLERGIES:    Allergies   Allergen Reactions    Fosamax [Alendronate Sodium] DIZZINESS       MEDICATIONS:    Current Outpatient Medications   Medication Sig Dispense Refill    Glucosamine 500 MG Oral Cap Take 1 capsule by mouth daily.      carvedilol 6.25 MG Oral Tab Take 1 tablet (6.25 mg total) by mouth 2 (two) times daily as needed. 1.5 tab at 8:30 am and 2 tab at 7:30 pm      aspirin 81 MG Oral Tab EC Take 1 tablet (81 mg total) by mouth daily.  0    atorvastatin 20 MG Oral Tab Take 1 tablet (20 mg total) by mouth nightly.      prochlorperazine (COMPAZINE) 10 mg tablet Take 1 tablet (10 mg total) by mouth every 6 (six) hours as needed for Nausea. 30 tablet 3    ondansetron (ZOFRAN) 8 MG tablet Take 1 tablet (8 mg total) by  mouth every 8 (eight) hours as needed for Nausea. 30 tablet 3    hydrochlorothiazide 25 MG Oral Tab Take 1 tablet (25 mg total) by mouth daily. Hold if systolic BP is below 120  0    Losartan Potassium 25 MG Oral Tab Take 1.5 tablets (37.5 mg total) by mouth nightly. Hold if systolic BP is below 120  1.5 tablets at HS 30 tablet 11    Acetaminophen 500 MG Oral Cap Take 1-2 capsules (500-1,000 mg total) by mouth every 6 (six) hours as needed for Pain. 60 capsule 2    Levothyroxine Sodium 75 MCG Oral Tab Take 1 tablet (75 mcg total) by mouth before breakfast.      ergocalciferol 96971 UNITS Oral Cap Take 1 capsule (50,000 Units total) by mouth once a week.  0    Multiple Vitamins-Minerals (MULTIVITAMIN WITH MINERALS) Oral Tab Take 1 tablet by mouth daily.      Meloxicam 15 MG Oral Tab Take 1 tablet (15 mg total) by mouth daily. (Patient not taking: Reported on 2024)         VITALS:  Height: 163.8 cm (5' 4.49\") (1102)  Weight: 77 kg (169 lb 12.8 oz) (1102)  BSA (Calculated - sq m): 1.83 sq meters (1102)  Pulse: 73 (1102)  BP: 146/80 (1102)  Temp: 98 °F (36.7 °C) (1102)  Do Not Use - Resp Rate: --  SpO2: 100 % (1102)    PS:  ECO    PHYSICAL EXAM:    General: alert and oriented x 3, not in acute distress.  Accompanied by .  HEENT: MOE.  CVS: S1S2, regular  Rhythm, no murmurs.   Lungs: Clear to auscultation and percussion.  Abdomen:   CNS: no focal deficit    Emotional well being: Patient's emotional well being was assessed.  No issues requiring acute psychosocial intervention were identified.     LABS:     Recent Results (from the past 24 hour(s))   Iron And Tibc    Collection Time: 24 11:04 AM   Result Value Ref Range    Iron 72 50 - 170 ug/dL    Transferrin 235 200 - 360 mg/dL    Total Iron Binding Capacity 350 240 - 450 ug/dL    % Saturation 21 15 - 50 %   Comp Metabolic Panel (14)    Collection Time: 24 11:04 AM   Result Value Ref Range    Glucose  101 (H) 70 - 99 mg/dL    Sodium 139 136 - 145 mmol/L    Potassium 3.6 3.5 - 5.1 mmol/L    Chloride 105 98 - 112 mmol/L    CO2 31.0 21.0 - 32.0 mmol/L    Anion Gap 3 0 - 18 mmol/L    BUN 18 9 - 23 mg/dL    Creatinine 0.72 0.55 - 1.02 mg/dL    Calcium, Total 9.2 8.5 - 10.1 mg/dL    Calculated Osmolality 290 275 - 295 mOsm/kg    eGFR-Cr 85 >=60 mL/min/1.73m2    AST 29 15 - 37 U/L    ALT 34 13 - 56 U/L    Alkaline Phosphatase 96 55 - 142 U/L    Bilirubin, Total 0.3 0.1 - 2.0 mg/dL    Total Protein 7.0 6.4 - 8.2 g/dL    Albumin 3.3 (L) 3.4 - 5.0 g/dL    Globulin  3.7 2.8 - 4.4 g/dL    A/G Ratio 0.9 (L) 1.0 - 2.0    Patient Fasting for CMP? Patient not present    CBC W/ DIFFERENTIAL    Collection Time: 02/13/24 11:04 AM   Result Value Ref Range    WBC 6.8 4.0 - 11.0 x10(3) uL    RBC 4.11 3.80 - 5.30 x10(6)uL    HGB 11.3 (L) 12.0 - 16.0 g/dL    HCT 34.6 (L) 35.0 - 48.0 %    .0 150.0 - 450.0 10(3)uL    MCV 84.2 80.0 - 100.0 fL    MCH 27.5 26.0 - 34.0 pg    MCHC 32.7 31.0 - 37.0 g/dL    RDW 14.5 %    Neutrophil Absolute Prelim 4.18 1.50 - 7.70 x10 (3) uL    Neutrophil Absolute 4.18 1.50 - 7.70 x10(3) uL    Lymphocyte Absolute 1.85 1.00 - 4.00 x10(3) uL    Monocyte Absolute 0.53 0.10 - 1.00 x10(3) uL    Eosinophil Absolute 0.20 0.00 - 0.70 x10(3) uL    Basophil Absolute 0.02 0.00 - 0.20 x10(3) uL    Immature Granulocyte Absolute 0.05 0.00 - 1.00 x10(3) uL    Neutrophil % 61.2 %    Lymphocyte % 27.1 %    Monocyte % 7.8 %    Eosinophil % 2.9 %    Basophil % 0.3 %    Immature Granulocyte % 0.7 %         ASSESSMENT AND PLAN:     # Her 2 + L breast ca: clinical T1 N0. Diagnosed 2/23.  Biopsy-grade 2 IDC, ER/DE neg, HER+, Ki-67 25-30%.  Received neoadjuvant weekly paclitaxel/trastuzumab from 3/16/23-5/30/23.  S/p L mastectomy/SLN bx 7/27/23.  No residual carcinoma in breast, 2 tumor beds measuring 11 mm and 9 mm.  LN negative.    Not candidate for RT or endocrine therapy.  Continue trastuzumab till 3/24. Echo 10/23 EF 55%,  next 1/24.  May get Port-A-Cath removed after February.    # Iron defi anemia: Receiving IV iron.  Hb stable.  Recheck next visit.    # Hypokalemia: Resolved.    We discussed surveillance.  Due for right screening mammogram 12/24.  Upcoming echocardiogram.  Port to be removed in February.  We discussed signs and symptoms that may be concerning for metastatic disease.  Consider survivorship next visit.    ORDERS PLACED:        Procedures    CBC With Differential With Platelet    Comp Metabolic Panel (14)    Ferritin    Iron And Tibc       Return in about 3 months (around 5/13/2024) for Labs, MD visit, Chemo.    Trang Soares M.D.    [facility]    [facility]  [address] [place], [address]    12/29/2023

## 2024-02-13 NOTE — H&P (VIEW-ONLY)
[facility] Progress Note      Patient Name:  Kelsi Sena  YOB: 1945  Medical Record Number:  [id number]    Date of visit: 12/29/2023    CHIEF COMPLAINT: HER2 + L breast carcinoma.    HPI:     78 year old that I have followed since 2/23. L biopsy 2/23-grade 2 IDC, ER/[place] neg, HER2+, Ki-67 25-30%.  Clinical T1N0.  1.2 cm mass on mammogram, 0.5 cm length of tumor on core biopsy.  Received 12 doses of weekly paclitaxel/trastuzumab from 3/16/23-5/30/23.  Has been on trastuzumab since then.  Underwent left mastectomy/SLN biopsy 7/27/23.  Path-complete response.  Presents for trastuzumab.    No new complaints.  Energy level has    SOCIAL HISTORY:    . 2 sons-local. 5 grand kids-oldest 19.    ROS:     Constitutional: no fever, chills fatigue,night sweats or weight loss  Neurologic: no headache, seizures, diplopia or weakness  Respiratory: no cough, SOB or hemoptysis  Cardiovascular: no chest pain, ankle swelling, LUU  GI: no nausea, vomiting, diarrhea or BRBPR  Musculoskeletal: no body-ache or joint pain  Dermatologic: no alopecia, rash, pruritis  : no hematuria, dysuria or frequency  Psych: no confusion or depression   Heme: no easy bruising or bleeding     ALLERGIES:    Allergies   Allergen Reactions    Fosamax [Alendronate Sodium] DIZZINESS       MEDICATIONS:    Current Outpatient Medications   Medication Sig Dispense Refill    Glucosamine 500 MG Oral Cap Take 1 capsule by mouth daily.      carvedilol 6.25 MG Oral Tab Take 1 tablet (6.25 mg total) by mouth 2 (two) times daily as needed. 1.5 tab at 8:30 am and 2 tab at 7:30 pm      aspirin 81 MG Oral Tab EC Take 1 tablet (81 mg total) by mouth daily.  0    atorvastatin 20 MG Oral Tab Take 1 tablet (20 mg total) by mouth nightly.      prochlorperazine (COMPAZINE) 10 mg tablet Take 1 tablet (10 mg total) by mouth every 6 (six) hours as needed for Nausea. 30 tablet 3    ondansetron (ZOFRAN) 8 MG tablet Take 1 tablet (8 mg total) by  mouth every 8 (eight) hours as needed for Nausea. 30 tablet 3    hydrochlorothiazide 25 MG Oral Tab Take 1 tablet (25 mg total) by mouth daily. Hold if systolic BP is below 120  0    Losartan Potassium 25 MG Oral Tab Take 1.5 tablets (37.5 mg total) by mouth nightly. Hold if systolic BP is below 120  1.5 tablets at HS 30 tablet 11    Acetaminophen 500 MG Oral Cap Take 1-2 capsules (500-1,000 mg total) by mouth every 6 (six) hours as needed for Pain. 60 capsule 2    Levothyroxine Sodium 75 MCG Oral Tab Take 1 tablet (75 mcg total) by mouth before breakfast.      ergocalciferol 48393 UNITS Oral Cap Take 1 capsule (50,000 Units total) by mouth once a week.  0    Multiple Vitamins-Minerals (MULTIVITAMIN WITH MINERALS) Oral Tab Take 1 tablet by mouth daily.      Meloxicam 15 MG Oral Tab Take 1 tablet (15 mg total) by mouth daily. (Patient not taking: Reported on 2024)         VITALS:  Height: 163.8 cm (5' 4.49\") (1102)  Weight: 77 kg (169 lb 12.8 oz) (1102)  BSA (Calculated - sq m): 1.83 sq meters (1102)  Pulse: 73 (1102)  BP: 146/80 (1102)  Temp: 98 °F (36.7 °C) (1102)  Do Not Use - Resp Rate: --  SpO2: 100 % (1102)    PS:  ECO    PHYSICAL EXAM:    General: alert and oriented x 3, not in acute distress.  Accompanied by .  HEENT: MOE.  CVS: S1S2, regular  Rhythm, no murmurs.   Lungs: Clear to auscultation and percussion.  Abdomen:   CNS: no focal deficit    Emotional well being: Patient's emotional well being was assessed.  No issues requiring acute psychosocial intervention were identified.     LABS:     Recent Results (from the past 24 hour(s))   Iron And Tibc    Collection Time: 24 11:04 AM   Result Value Ref Range    Iron 72 50 - 170 ug/dL    Transferrin 235 200 - 360 mg/dL    Total Iron Binding Capacity 350 240 - 450 ug/dL    % Saturation 21 15 - 50 %   Comp Metabolic Panel (14)    Collection Time: 24 11:04 AM   Result Value Ref Range    Glucose  101 (H) 70 - 99 mg/dL    Sodium 139 136 - 145 mmol/L    Potassium 3.6 3.5 - 5.1 mmol/L    Chloride 105 98 - 112 mmol/L    CO2 31.0 21.0 - 32.0 mmol/L    Anion Gap 3 0 - 18 mmol/L    BUN 18 9 - 23 mg/dL    Creatinine 0.72 0.55 - 1.02 mg/dL    Calcium, Total 9.2 8.5 - 10.1 mg/dL    Calculated Osmolality 290 275 - 295 mOsm/kg    eGFR-Cr 85 >=60 mL/min/1.73m2    AST 29 15 - 37 U/L    ALT 34 13 - 56 U/L    Alkaline Phosphatase 96 55 - 142 U/L    Bilirubin, Total 0.3 0.1 - 2.0 mg/dL    Total Protein 7.0 6.4 - 8.2 g/dL    Albumin 3.3 (L) 3.4 - 5.0 g/dL    Globulin  3.7 2.8 - 4.4 g/dL    A/G Ratio 0.9 (L) 1.0 - 2.0    Patient Fasting for CMP? Patient not present    CBC W/ DIFFERENTIAL    Collection Time: 02/13/24 11:04 AM   Result Value Ref Range    WBC 6.8 4.0 - 11.0 x10(3) uL    RBC 4.11 3.80 - 5.30 x10(6)uL    HGB 11.3 (L) 12.0 - 16.0 g/dL    HCT 34.6 (L) 35.0 - 48.0 %    .0 150.0 - 450.0 10(3)uL    MCV 84.2 80.0 - 100.0 fL    MCH 27.5 26.0 - 34.0 pg    MCHC 32.7 31.0 - 37.0 g/dL    RDW 14.5 %    Neutrophil Absolute Prelim 4.18 1.50 - 7.70 x10 (3) uL    Neutrophil Absolute 4.18 1.50 - 7.70 x10(3) uL    Lymphocyte Absolute 1.85 1.00 - 4.00 x10(3) uL    Monocyte Absolute 0.53 0.10 - 1.00 x10(3) uL    Eosinophil Absolute 0.20 0.00 - 0.70 x10(3) uL    Basophil Absolute 0.02 0.00 - 0.20 x10(3) uL    Immature Granulocyte Absolute 0.05 0.00 - 1.00 x10(3) uL    Neutrophil % 61.2 %    Lymphocyte % 27.1 %    Monocyte % 7.8 %    Eosinophil % 2.9 %    Basophil % 0.3 %    Immature Granulocyte % 0.7 %         ASSESSMENT AND PLAN:     # Her 2 + L breast ca: clinical T1 N0. Diagnosed 2/23.  Biopsy-grade 2 IDC, ER/AR neg, HER+, Ki-67 25-30%.  Received neoadjuvant weekly paclitaxel/trastuzumab from 3/16/23-5/30/23.  S/p L mastectomy/SLN bx 7/27/23.  No residual carcinoma in breast, 2 tumor beds measuring 11 mm and 9 mm.  LN negative.    Not candidate for RT or endocrine therapy.  Continue trastuzumab till 3/24. Echo 10/23 EF 55%,  next 1/24.  May get Port-A-Cath removed after February.    # Iron defi anemia: Receiving IV iron.  Hb stable.  Recheck next visit.    # Hypokalemia: Resolved.    We discussed surveillance.  Due for right screening mammogram 12/24.  Upcoming echocardiogram.  Port to be removed in February.  We discussed signs and symptoms that may be concerning for metastatic disease.  Consider survivorship next visit.    ORDERS PLACED:        Procedures    CBC With Differential With Platelet    Comp Metabolic Panel (14)    Ferritin    Iron And Tibc       Return in about 3 months (around 5/13/2024) for Labs, MD visit, Chemo.    Trang Soares M.D.    [facility]    [facility]  [address] [place], [address]    12/29/2023

## 2024-02-13 NOTE — PROGRESS NOTES
Pt here for Trazimera. Arrives Ambulating independently, accompanied by Spouse     Patient was evaluated today by MD.    Oral medications included in this regimen:  no    Patient confirms comprehension of cancer treatment schedule:  yes    Pregnancy screening:  Not applicable    Modifications in dose or schedule:  No    Medications appearance and physical integrity checked by RN: yes.    Chemotherapy IV pump settings verified by 2 RNs:  No due to targeted therapy IV administration.  Frequency of blood return and site check throughout administration: Prior to administration and At completion of therapy     Infusion/treatment outcome:  patient tolerated treatment without incident    Education Record    Learner:  Patient and Spouse  Barriers / Limitations:  None  Method:  Brief focused  Education / instructions given:  POC  Outcome:  Shows understanding    Discharged Home, Ambulating independently, accompanied by:Self and Spouse    Patient/family verbalized understanding of future appointments: by printed AVS    Done with treatment - will have port removed at the end of the month, will return in 3 months (5/21) for MD dipti.

## 2024-02-22 ENCOUNTER — HOSPITAL ENCOUNTER (OUTPATIENT)
Dept: INTERVENTIONAL RADIOLOGY/VASCULAR | Facility: HOSPITAL | Age: 79
Discharge: HOME | End: 2024-02-22
Attending: INTERNAL MEDICINE | Admitting: INTERNAL MEDICINE
Payer: COMMERCIAL

## 2024-02-22 VITALS
DIASTOLIC BLOOD PRESSURE: 59 MMHG | OXYGEN SATURATION: 97 % | HEART RATE: 72 BPM | SYSTOLIC BLOOD PRESSURE: 135 MMHG | TEMPERATURE: 97 F | RESPIRATION RATE: 18 BRPM

## 2024-02-22 DIAGNOSIS — Z17.1 MALIGNANT NEOPLASM OF UPPER-OUTER QUADRANT OF LEFT BREAST IN FEMALE, ESTROGEN RECEPTOR NEGATIVE (HCC): ICD-10-CM

## 2024-02-22 DIAGNOSIS — C50.412 MALIGNANT NEOPLASM OF UPPER-OUTER QUADRANT OF LEFT BREAST IN FEMALE, ESTROGEN RECEPTOR NEGATIVE (HCC): ICD-10-CM

## 2024-02-22 LAB
INR CARTRIDGE LOT #: NORMAL
INR: 1 (ref 0.8–1.3)

## 2024-02-22 PROCEDURE — 85610 PROTHROMBIN TIME: CPT | Performed by: STUDENT IN AN ORGANIZED HEALTH CARE EDUCATION/TRAINING PROGRAM

## 2024-02-22 PROCEDURE — 36590 REMOVAL TUNNELED CV CATH: CPT | Performed by: STUDENT IN AN ORGANIZED HEALTH CARE EDUCATION/TRAINING PROGRAM

## 2024-02-22 PROCEDURE — 99152 MOD SED SAME PHYS/QHP 5/>YRS: CPT | Performed by: STUDENT IN AN ORGANIZED HEALTH CARE EDUCATION/TRAINING PROGRAM

## 2024-02-22 PROCEDURE — 0JPT3WZ REMOVAL OF TOTALLY IMPLANTABLE VASCULAR ACCESS DEVICE FROM TRUNK SUBCUTANEOUS TISSUE AND FASCIA, PERCUTANEOUS APPROACH: ICD-10-PCS | Performed by: STUDENT IN AN ORGANIZED HEALTH CARE EDUCATION/TRAINING PROGRAM

## 2024-02-22 PROCEDURE — 77001 FLUOROGUIDE FOR VEIN DEVICE: CPT | Performed by: STUDENT IN AN ORGANIZED HEALTH CARE EDUCATION/TRAINING PROGRAM

## 2024-02-22 RX ORDER — SODIUM CHLORIDE 9 MG/ML
INJECTION, SOLUTION INTRAVENOUS CONTINUOUS
Status: DISCONTINUED | OUTPATIENT
Start: 2024-02-22 | End: 2024-02-22

## 2024-02-22 RX ORDER — LIDOCAINE HYDROCHLORIDE 10 MG/ML
INJECTION, SOLUTION INFILTRATION; PERINEURAL
Status: COMPLETED
Start: 2024-02-22 | End: 2024-02-22

## 2024-02-22 RX ORDER — CEFAZOLIN SODIUM 1 G/3ML
INJECTION, POWDER, FOR SOLUTION INTRAMUSCULAR; INTRAVENOUS
Status: COMPLETED
Start: 2024-02-22 | End: 2024-02-22

## 2024-02-22 RX ORDER — LIDOCAINE HYDROCHLORIDE AND EPINEPHRINE BITARTRATE 20; .01 MG/ML; MG/ML
INJECTION, SOLUTION SUBCUTANEOUS
Status: COMPLETED
Start: 2024-02-22 | End: 2024-02-22

## 2024-02-22 RX ORDER — MIDAZOLAM HYDROCHLORIDE 1 MG/ML
INJECTION INTRAMUSCULAR; INTRAVENOUS
Status: COMPLETED
Start: 2024-02-22 | End: 2024-02-22

## 2024-02-22 NOTE — PROGRESS NOTES
Pt s/p port removal in IR. Pt recovered in holding. Pt  at bedside. HOB elevated. Right chest with mepilex dressing, cdi, no bleeding or hematoma. Pt denies pain. Pt ate and drank. Discharge instructions reviewed with pt and , copy given. After recovery IV removed and pt dressed. Pt discharged to [facility] via wheelchair by volunteer. Pt left with belongings. Pt  drove pt home.

## 2024-02-22 NOTE — DISCHARGE INSTRUCTIONS
~ Follow up with MDs as scheduled.   ~ Rest today and resume regular activity in the am as tolerated.   ~ No driving or drinking alcohol for 24hrs.   ~ Resume diet and medications.   ~ No lifting more than 10 pounds for 48hrs. Avoid lifting heavy objects such as a purse or a backpack from the shoulder of which the port was placed for 48hrs.   ~ Avoid strenuous activity with the arm of which th port was placed for 48hrs.   ~ Keep bandage completely dry and intact. Leave large brown bandage on for 5 days. On day 5 remove the bandage and you can then shower and get site wet. There may be white steri-strips under the brown dressing. You can get them wet. They will fall off naturally  ~ Notify MD if any signs of infection... Fever, chills, redness, swelling or drainage.   ~ It is normal to have some discomfort at the incisions ite for the first 24-48hrs. You make take over the counter Tylenol if needed.   ~

## 2024-02-22 NOTE — INTERVAL H&P NOTE
The above referenced H&P was reviewed by Terence Sena MD on 2/22/2024, the patient was examined and no significant changes have occurred in the patient's condition since the H&P was performed.  Risks, benefits, alternative treatments and consequences of no treatment were discussed.  We will proceed with procedure as planned.      Terence Sena MD  2/22/2024  2:25 PM

## 2024-02-22 NOTE — PROCEDURES
[facility]  Procedure Note    Kelsi Sena Patient Status:  Outpatient    1945 MRN [id number]   Location [facility] INTERVENTIONAL SUITES Attending Aminah Soares MD   Hosp Day # 0 PCP Nahomi Bueno MD     Procedure: Port catheter removal    Pre-Procedure Diagnosis:  No longer needed    Post-Procedure Diagnosis: Same    Anesthesia:  Local and Sedation    Findings:  Successful removal or right chest port catheter.    Specimens: None    Blood Loss:  <5ml    Tourniquet Time: 0  Complications:  None  Drains:  None    Secondary Diagnosis:  None    Terence Sena MD  2024

## 2024-04-25 ENCOUNTER — OFFICE VISIT (OUTPATIENT)
Dept: HEMATOLOGY/ONCOLOGY | Facility: HOSPITAL | Age: 79
End: 2024-04-25
Attending: INTERNAL MEDICINE
Payer: COMMERCIAL

## 2024-04-25 DIAGNOSIS — Z85.3 PERSONAL HISTORY OF BREAST CANCER: ICD-10-CM

## 2024-04-25 DIAGNOSIS — Z08 ENCOUNTER FOR FOLLOW-UP EXAMINATION AFTER COMPLETED TREATMENT FOR MALIGNANT NEOPLASM: Primary | ICD-10-CM

## 2024-04-25 DIAGNOSIS — Z71.9 COUNSELING, UNSPECIFIED: ICD-10-CM

## 2024-04-25 PROCEDURE — 99417 PROLNG OP E/M EACH 15 MIN: CPT | Performed by: NURSE PRACTITIONER

## 2024-04-25 PROCEDURE — 99215 OFFICE O/P EST HI 40 MIN: CPT | Performed by: NURSE PRACTITIONER

## 2024-04-25 NOTE — PROGRESS NOTES
I met with Kelsi and her  for a Survivorship Clinic visit to provide a survivorship care plan (SCP) and information related to post-treatment care.  She is a 78 year old female who had a left breast biopsy in 2/2023 that showed a Gr2, IDC, ER-, LA-, HER2+ cancer with Ki-67 of 25-30%, Clinical stage cT1cN0, Stage IA. She was seen by Dr. Trang Soares who recommended neoadjuvant therapy with Paclitaxel and Trastuzumab x 4 cycles, which she received from 3/16/2023-5/30/2023. Genetic testing was negative. On 7/27/2023 Dr. Juanita Dalal performed a left mastectomy with sentinel node biopsy. No reconstruction was done per patient preference. Pathologic staging showed ypT0, pN0, two negative lymph nodes, with complete pathologic response to therapy, no residual cancer in the breast or lymph nodes. No adjuvant endocrine therapy or radiation therapy were indicated and she continued Trastuzumab alone which completed on 2/13/2024. Portacath was removed on 2/22/2024. (See Oncology Treatment Summary SCP for details).      Current condition/issues: Kelsi reports she is doing well post-surgery.  She has purchased prosthetics from [employer] but prefers to use lighter weight prosthetics that she purchased on her own. She is interested in trying [employer] prosthetics. She has no post-surgical issues with good ROM and no noted swelling. She appears to have adapted well to the mastectomy. She reports having good support from her  and family during her treatment. She sleeps well and denies anxiety or depression.  She reports eating healthy with most meals prepared at home. She currently does no formal exercise but has used a [employer] bike in the past 3x/week and considers starting that again.  She has a history of osteopenia but does little weight bearing exercise. Current BMI is 28.    Survivorship Care Plan and letter: Kelsi was provided a hard copy of the SCP and a letter that outlined the purpose of  the visit and plan.  We reviewed all elements of both documents. She is aware that a letter and SCP will be sent to her PCP, Dr. Nahomi Bueno.     Reviewed Cancer Surveillance (office visits, right breast imaging) and that Dr. Soares will oversee this care.  She will see her next on 5/21/2024. She will see Dr. Lizzie Hernandez on 9/17/2024 for first visit since Dr. Dalal left the organization. She will be due for right mammogram in 12/2024 and was provided the order for scheduling. She had recent ECHO on 2/2024.     Reviewed Schedule of other clinic visits with Primary Care and specialists: Dr. Bueno will continue to manage all general health care recommended for age and gender including cancer screening tests.  She has never had colon screening and we discussed the stool based tests as options.  She was encouraged to continue to see specialists at usual intervals.     Reviewed concerning symptoms that she should report to any Provider.      Reviewed possible late and long-term effects related to the treatment that was received.  We reviewed care of the surgical arm and management of hormone related side effects.  We reviewed impact of body image.     Reviewed common cancer survivor issues and resources available.  She declined many of my usual resources including the external support centers available. Various survivorship resources were provided to use going forward as needed (see below).     Reviewed Lifestyle/behaviors that can affect ongoing health, including the risk for the cancer coming back or developing another cancer.  We reviewed importance of physical activity including aerobic, strength training and weight bearing exercise. I encouraged tracking steps with eventual goal of 7000 steps/day.  We reviewed a plant based diet.  We reviewed skin care and sun safety.     The patient received a take-home folder that included the following survivorship resources. She declined some of the resources offered as she is  doing well.   -SCP and patient letter  -Breast Survivorship Guide    -ChooseMyPlate.gov handout-nutrition, physical activity  -ACS Cancer Screening Guidelines  -Websites: She declined these resources    Shamim and her  were given the opportunity to ask questions.  She had few questions and verbalized understanding of the information we discussed today.  She is doing very well. My total time spent caring for the patient on the day of the encounter: 70 minutes (10 minutes reviewing chart, 40 minutes of face to face counseling regarding survivorship education, review of care plan and additional resources and 20 minutes of documentation).  She was encouraged to call with any further questions. I will leave [employer] samples for her at the check-in desk in [place].  SUMIT Isidro

## 2024-05-14 ENCOUNTER — LAB ENCOUNTER (OUTPATIENT)
Dept: LAB | Age: 79
End: 2024-05-14
Attending: INTERNAL MEDICINE

## 2024-05-14 DIAGNOSIS — Z17.1 MALIGNANT NEOPLASM OF UPPER-OUTER QUADRANT OF LEFT BREAST IN FEMALE, ESTROGEN RECEPTOR NEGATIVE (HCC): ICD-10-CM

## 2024-05-14 DIAGNOSIS — C50.412 MALIGNANT NEOPLASM OF UPPER-OUTER QUADRANT OF LEFT BREAST IN FEMALE, ESTROGEN RECEPTOR NEGATIVE (HCC): ICD-10-CM

## 2024-05-14 DIAGNOSIS — D64.89 ANEMIA DUE TO OTHER CAUSE, NOT CLASSIFIED: ICD-10-CM

## 2024-05-14 LAB
ALBUMIN SERPL-MCNC: 3.3 G/DL (ref 3.4–5)
ALBUMIN/GLOB SERPL: 0.8 {RATIO} (ref 1–2)
ALP LIVER SERPL-CCNC: 97 U/L
ALT SERPL-CCNC: 26 U/L
ANION GAP SERPL CALC-SCNC: 8 MMOL/L (ref 0–18)
AST SERPL-CCNC: 20 U/L (ref 15–37)
BASOPHILS # BLD AUTO: 0.02 X10(3) UL (ref 0–0.2)
BASOPHILS NFR BLD AUTO: 0.3 %
BILIRUB SERPL-MCNC: 0.4 MG/DL (ref 0.1–2)
BUN BLD-MCNC: 15 MG/DL (ref 9–23)
CALCIUM BLD-MCNC: 9.4 MG/DL (ref 8.5–10.1)
CHLORIDE SERPL-SCNC: 106 MMOL/L (ref 98–112)
CO2 SERPL-SCNC: 25 MMOL/L (ref 21–32)
CREAT BLD-MCNC: 0.66 MG/DL
DEPRECATED HBV CORE AB SER IA-ACNC: 303.5 NG/ML
EGFRCR SERPLBLD CKD-EPI 2021: 89 ML/MIN/1.73M2 (ref 60–?)
EOSINOPHIL # BLD AUTO: 0.25 X10(3) UL (ref 0–0.7)
EOSINOPHIL NFR BLD AUTO: 3.4 %
ERYTHROCYTE [DISTWIDTH] IN BLOOD BY AUTOMATED COUNT: 13.9 %
FASTING STATUS PATIENT QL REPORTED: YES
GLOBULIN PLAS-MCNC: 4.1 G/DL (ref 2.8–4.4)
GLUCOSE BLD-MCNC: 103 MG/DL (ref 70–99)
HCT VFR BLD AUTO: 39.7 %
HGB BLD-MCNC: 12.6 G/DL
IMM GRANULOCYTES # BLD AUTO: 0.03 X10(3) UL (ref 0–1)
IMM GRANULOCYTES NFR BLD: 0.4 %
IRON SATN MFR SERPL: 20 %
IRON SERPL-MCNC: 70 UG/DL
LYMPHOCYTES # BLD AUTO: 1.96 X10(3) UL (ref 1–4)
LYMPHOCYTES NFR BLD AUTO: 27 %
MCH RBC QN AUTO: 27.6 PG (ref 26–34)
MCHC RBC AUTO-ENTMCNC: 31.7 G/DL (ref 31–37)
MCV RBC AUTO: 87.1 FL
MONOCYTES # BLD AUTO: 0.58 X10(3) UL (ref 0.1–1)
MONOCYTES NFR BLD AUTO: 8 %
NEUTROPHILS # BLD AUTO: 4.43 X10 (3) UL (ref 1.5–7.7)
NEUTROPHILS # BLD AUTO: 4.43 X10(3) UL (ref 1.5–7.7)
NEUTROPHILS NFR BLD AUTO: 60.9 %
OSMOLALITY SERPL CALC.SUM OF ELEC: 289 MOSM/KG (ref 275–295)
PLATELET # BLD AUTO: 224 10(3)UL (ref 150–450)
POTASSIUM SERPL-SCNC: 3.6 MMOL/L (ref 3.5–5.1)
PROT SERPL-MCNC: 7.4 G/DL (ref 6.4–8.2)
RBC # BLD AUTO: 4.56 X10(6)UL
SODIUM SERPL-SCNC: 139 MMOL/L (ref 136–145)
TIBC SERPL-MCNC: 349 UG/DL (ref 240–450)
TRANSFERRIN SERPL-MCNC: 234 MG/DL (ref 200–360)
WBC # BLD AUTO: 7.3 X10(3) UL (ref 4–11)

## 2024-05-14 PROCEDURE — 83550 IRON BINDING TEST: CPT

## 2024-05-14 PROCEDURE — 80053 COMPREHEN METABOLIC PANEL: CPT

## 2024-05-14 PROCEDURE — 82728 ASSAY OF FERRITIN: CPT

## 2024-05-14 PROCEDURE — 85025 COMPLETE CBC W/AUTO DIFF WBC: CPT

## 2024-05-14 PROCEDURE — 83540 ASSAY OF IRON: CPT

## 2024-05-21 ENCOUNTER — OFFICE VISIT (OUTPATIENT)
Dept: HEMATOLOGY/ONCOLOGY | Facility: HOSPITAL | Age: 79
End: 2024-05-21
Attending: INTERNAL MEDICINE

## 2024-05-21 VITALS
OXYGEN SATURATION: 94 % | HEART RATE: 64 BPM | TEMPERATURE: 98 F | HEIGHT: 64.49 IN | WEIGHT: 172.5 LBS | BODY MASS INDEX: 29.09 KG/M2 | SYSTOLIC BLOOD PRESSURE: 156 MMHG | DIASTOLIC BLOOD PRESSURE: 80 MMHG | RESPIRATION RATE: 16 BRPM

## 2024-05-21 DIAGNOSIS — T45.1X5D ADVERSE EFFECT OF CHEMOTHERAPY, SUBSEQUENT ENCOUNTER: ICD-10-CM

## 2024-05-21 DIAGNOSIS — Z17.1 MALIGNANT NEOPLASM OF OVERLAPPING SITES OF LEFT BREAST IN FEMALE, ESTROGEN RECEPTOR NEGATIVE (HCC): Primary | ICD-10-CM

## 2024-05-21 DIAGNOSIS — C50.812 MALIGNANT NEOPLASM OF OVERLAPPING SITES OF LEFT BREAST IN FEMALE, ESTROGEN RECEPTOR NEGATIVE (HCC): Primary | ICD-10-CM

## 2024-05-21 PROCEDURE — 99214 OFFICE O/P EST MOD 30 MIN: CPT | Performed by: INTERNAL MEDICINE

## 2024-05-21 NOTE — PROGRESS NOTES
Education Record    Learner:  Patient/ spouse    Disease / Diagnosis: left her2+ breast cancer   Left mastectomy     Barriers / Limitations:  None   Comments:    Method:  Discussion   Comments:    General Topics:  Plan of care reviewed   Comments:    Outcome:  Shows understanding   Comments:   Right breast imaging up to date.   Pt feeling well.   Had port removed and feels lump in area, explained could be scar tissue.   Feeling well, energy level is good.   Eating and drinking well.   No new medical problems.   Will establish with Dr Hernandez in Sept.

## 2024-05-21 NOTE — PROGRESS NOTES
[facility] Progress Note      Patient Name:  Kelsi Sena  YOB: 1945  Medical Record Number:  [id number]    Date of visit: 5/21/2024      CHIEF COMPLAINT: HER2 + L breast carcinoma.    ONCOLOGY HISTORY:    HER2 positive left breast carcinoma diagnosed 2/23.  Weekly paclitaxel/trastuzumab 3/23-5/23.  Trastuzumab 6/23-2/24.  L mastectomy/SLN biopsy 7/23-complete pathologic response.    HPI:     78 year old that I have followed since 2/23. L biopsy 2/23-grade 2 IDC, ER/[place] neg, HER2+, Ki-67 25-30%.  Clinical T1N0.  1.2 cm mass on mammogram, 0.5 cm length of tumor on core biopsy.  Received 12 doses of weekly paclitaxel/trastuzumab from 3/16/23-5/30/23.  Has been on trastuzumab since then.  Underwent left mastectomy/SLN biopsy 7/27/23.  Path-complete response.  Trastuzumab completed 2/24.    No new complaints.  Feels well.    SOCIAL HISTORY:    . 2 sons-local. 5 grand kids-oldest 19.    ROS:     Constitutional: no fever, chills fatigue,night sweats or weight loss  Neurologic: no headache, seizures, diplopia or weakness  Respiratory: no cough, SOB or hemoptysis  Cardiovascular: no chest pain, ankle swelling, LUU  GI: no nausea, vomiting, diarrhea or BRBPR  Musculoskeletal: no body-ache or joint pain  Dermatologic: no alopecia, rash, pruritis  : no hematuria, dysuria or frequency  Psych: no confusion or depression   Heme: no easy bruising or bleeding     ALLERGIES:    Allergies   Allergen Reactions    Fosamax [Alendronate Sodium] DIZZINESS       MEDICATIONS:    Current Outpatient Medications   Medication Sig Dispense Refill    Glucosamine 500 MG Oral Cap Take 1 capsule by mouth daily.      carvedilol 6.25 MG Oral Tab Take 1 tablet (6.25 mg total) by mouth 2 (two) times daily as needed. 1.5 tab at 8:30 am and 2 tab at 7:30 pm      aspirin 81 MG Oral Tab EC Take 1 tablet (81 mg total) by mouth daily.  0    atorvastatin 20 MG Oral Tab Take 1 tablet (20 mg total) by mouth nightly.       hydrochlorothiazide 25 MG Oral Tab Take 1 tablet (25 mg total) by mouth daily. Hold if systolic BP is below 120  0    Losartan Potassium 25 MG Oral Tab Take 1.5 tablets (37.5 mg total) by mouth nightly. Hold if systolic BP is below 120  1.5 tablets at HS 30 tablet 11    Acetaminophen 500 MG Oral Cap Take 1-2 capsules (500-1,000 mg total) by mouth every 6 (six) hours as needed for Pain. 60 capsule 2    Levothyroxine Sodium 75 MCG Oral Tab Take 1 tablet (75 mcg total) by mouth before breakfast.      ergocalciferol 62581 UNITS Oral Cap Take 1 capsule (50,000 Units total) by mouth once a week.  0    Multiple Vitamins-Minerals (MULTIVITAMIN WITH MINERALS) Oral Tab Take 1 tablet by mouth daily.         VITALS:  Height: 163.8 cm (5' 4.49\") (1136)  Weight: 78.2 kg (172 lb 8 oz) (1136)  BSA (Calculated - sq m): 1.85 sq meters (1136)  Pulse: 64 (1136)  BP: 156/80 (1136)  Temp: 97.8 °F (36.6 °C) (1136)  Do Not Use - Resp Rate: --  SpO2: 94 % (1136)    PS:  ECO    PHYSICAL EXAM:    General: alert and oriented x 3, not in acute distress.  Accompanied by .  HEENT: MOE.  CVS: S1S2, regular  Rhythm, no murmurs.   Lungs: Clear to auscultation and percussion.  Abdomen:   CNS: no focal deficit  Breasts: Left mastectomy scar is well-healed.  No axillary adenopathy.  Right breast is soft without any masses.  At the port removal site, she has a nodule measuring 5 to 6 mm inferior to incision site that is slightly tender.    Emotional well being: Patient's emotional well being was assessed.  No issues requiring acute psychosocial intervention were identified.     LABS:     No results found for this or any previous visit (from the past 24 hour(s)).      ASSESSMENT AND PLAN:     # Her 2 + L breast ca: clinical T1 N0. Ximena .  Bx-grade 2 IDC, ER/OR neg, HER+, Ki-67 25-30%.  Received neoadjuvant weekly paclitaxel/trastuzumab from 3/16/23-23.  S/p L mastectomy/SLN bx 23.  No  residual carcinoma in breast, 2 tumor beds measuring 11 mm and 9 mm.  LN negative.  Trastuzumab completed 2/24.  Not candidate for RT or endocrine therapy.  Port removed 2/24.  Right mammogram 12/23 okay, next due 12/24.    # Iron defi anemia: Resolved.    # Palpable lump below port scar: Appears inflammatory/cyst.  Should contact office if worse, then can get chest CT.    ORDERS PLACED:    Return in about 4 months (around 9/21/2024) for Labs, MD visit.    Trang Soares M.D.    [facility]    [address] [place], [address]    5/21/2024

## 2024-08-13 ENCOUNTER — OFFICE VISIT (OUTPATIENT)
Dept: SURGERY | Facility: CLINIC | Age: 79
End: 2024-08-13
Payer: COMMERCIAL

## 2024-08-13 VITALS
SYSTOLIC BLOOD PRESSURE: 131 MMHG | DIASTOLIC BLOOD PRESSURE: 68 MMHG | HEIGHT: 64 IN | OXYGEN SATURATION: 98 % | BODY MASS INDEX: 29.16 KG/M2 | TEMPERATURE: 99 F | HEART RATE: 60 BPM | RESPIRATION RATE: 17 BRPM | WEIGHT: 170.81 LBS

## 2024-08-13 DIAGNOSIS — Z85.3 HISTORY OF LEFT BREAST CANCER: Primary | ICD-10-CM

## 2024-08-13 DIAGNOSIS — Z12.31 ENCOUNTER FOR SCREENING MAMMOGRAM FOR MALIGNANT NEOPLASM OF BREAST: ICD-10-CM

## 2024-08-13 NOTE — PROGRESS NOTES
Breast Surgery New Patient Consultation    This is the first visit for this 79 year old woman, referred by Dr. Dalal, who presents for evaluation of history of left breast cancer.    History of Present Illness:   Ms. Kelsi Sena is a 79 year old woman who presents to [facility] care with her history of breast cancer.  She had a left breast mastectomy back in July 2023 and her surgeon no longer is employed at our institution.  She has been following with her medical oncology specialist since then.  She states that surgery was uncomplicated and she did have a mastectomy following completion of chemotherapy with a complete pathological response.  She denies any chest wall nodules, skin changes or other concerns at this time.  She had a screening of her right breast in December 2023 that was unremarkable.  She does report some irritation at her right chest wall port site.  She is here today for evaluation and recommendations for further therapy.        Past Medical History:    Back pain    Back problem    Breast cancer (HCC)    CAD (coronary artery disease)    S/p LHC/ PCI DALY to RCA     Chest pain    Coronary atherosclerosis    Disorder of thyroid    Essential hypertension    High blood pressure    High cholesterol    History of blood transfusion    prior to hysterectomy    Hyperlipidemia    Melasma    Migraines    OA (osteoarthritis)    Osteoarthritis    Osteoporosis    Personal history of antineoplastic chemotherapy    last tx 5/30/2023       Past Surgical History:   Procedure Laterality Date    Angioplasty (coronary)  2014    stent    Cath bare metal stent (bms)      Hysterectomy  1990    partial hystero.    Ximena biopsy stereo nodule 1 site left (cpt=19081)  06/2021    Benign    Ximena localization wire 1 site left (cpt=19281) Left 1990    benign.    Mastectomy Left 07/27/2023    Mastectomy left      Nm parathyroid  2012    Other surgical history  2004    Left eye vitrectomy    Other surgical history  2012     removal of parathyroid lesion    Thyroidectomy post prev thyr surg      Total abdom hysterectomy      Total knee replacement Right        Past Medical History:    Back pain    Back problem    Breast cancer (HCC)    CAD (coronary artery disease)    S/p LHC/ PCI DALY to RCA     Chest pain    Coronary atherosclerosis    Disorder of thyroid    Essential hypertension    High blood pressure    High cholesterol    History of blood transfusion    prior to hysterectomy    Hyperlipidemia    Melasma    Migraines    OA (osteoarthritis)    Osteoarthritis    Osteoporosis    Personal history of antineoplastic chemotherapy    last tx 2023       Past Surgical History:   Procedure Laterality Date    Angioplasty (coronary)  2014    stent    Cath bare metal stent (bms)      Hysterectomy      partial hystero.    Ximena biopsy stereo nodule 1 site left (cpt=19081)  2021    Benign    Ximena localization wire 1 site left (cpt=19281) Left     benign.    Mastectomy Left 2023    Mastectomy left      Nm parathyroid  2012    Other surgical history      Left eye vitrectomy    Other surgical history      removal of parathyroid lesion    Thyroidectomy post prev thyr surg      Total abdom hysterectomy      Total knee replacement Right        Gynecological History:  Pt is a   Pt was 25 years old at time of first pregnancy.    She  cumulative breastfeeding history of  months.  She achieved menarche at age  and LMP   Age of Menopause:   Type:   She denies any history of hormone replacement therapy   She  history of oral contraceptive use for  years, last in .  She denies infertility treatment to achieve pregnancy.    Medications:     Glucosamine 500 MG Oral Cap Take 1 capsule by mouth daily.      carvedilol 6.25 MG Oral Tab Take 2 tablets (12.5 mg total) by mouth 2 (two) times daily as needed. 1.5 tab at 8:30 am and 2 tab at 7:30 pm      aspirin 81 MG Oral Tab EC Take 1 tablet (81 mg total) by mouth daily.  0     atorvastatin 20 MG Oral Tab Take 1 tablet (20 mg total) by mouth nightly.      hydrochlorothiazide 25 MG Oral Tab Take 1 tablet (25 mg total) by mouth daily. Hold if systolic BP is below 120  0    Losartan Potassium 25 MG Oral Tab Take 1 tablet (25 mg total) by mouth nightly. Hold if systolic BP is below 120  1.5 tablets at HS 30 tablet 11    Acetaminophen 500 MG Oral Cap Take 1-2 capsules (500-1,000 mg total) by mouth every 6 (six) hours as needed for Pain. 60 capsule 2    Levothyroxine Sodium 75 MCG Oral Tab Take 1 tablet (75 mcg total) by mouth before breakfast.      ergocalciferol 73858 UNITS Oral Cap Take 1 capsule (50,000 Units total) by mouth once a week.  0    Multiple Vitamins-Minerals (MULTIVITAMIN WITH MINERALS) Oral Tab Take 1 tablet by mouth daily.         Allergies:    Allergies   Allergen Reactions    Fosamax [Alendronate Sodium] DIZZINESS       Family History:   Family History   Problem Relation Age of Onset    Breast Cancer Self     Heart Disorder Mother         CHF    Heart Disorder Father         CHF    Heart Disorder Sister         CABG    Other (Osteoporosis) Sister     Heart Disorder Brother         CAD    Heart Disorder Brother         CAD    Lipids Son        She is not of Ashkenazi Christian ancestry.    Social History:  History   Alcohol Use No       History   Smoking Status    Never   Smokeless Tobacco    Never     Ms. Kelsi Sena is  with 2 children. She has 6 siblings. She is currently Homemaker  Review of Systems:  General:   The patient denies, fever, chills, night sweats, fatigue, generalized weakness, change in appetite or weight loss.    HEENT:     The patient denies eye irritation, cataracts, redness, glaucoma, yellowing of the eyes, change in vision, color blindness, or wearing contacts/glasses. The patient denies hearing loss, ringing in the ears, ear drainage, earaches, nasal congestion, nose bleeds, snoring, pain in mouth/throat, hoarseness, change in voice, facial  trauma.    Respiratory:  The patient denies chronic cough, phlegm, hemoptysis, pleurisy/chest pain, pneumonia, asthma, wheezing, difficulty in breathing with exertion, emphysema, chronic bronchitis, shortness of breath or abnormal sound when breathing.     Cardiovascular:  There is no history of chest pain, chest pressure/discomfort, palpitations, irregular heartbeat, fainting or near-fainting, difficulty breathing when lying flat, SOB/Coughing at night, swelling of the legs or chest pain while walking.    Breasts:  See history of present illness    Gastrointestinal:     There is no history of difficulty or pain with swallowing, reflux symptoms, vomiting, dark or bloody stools, constipation, yellowing of the skin, indigestion, nausea, change in bowel habits, diarrhea, abdominal pain or vomiting blood.     Genitourinary:  The patient denies frequent urination, needing to get up at night to urinate, urinary hesitancy or retaining urine, painful urination, urinary incontinence, decreased urine stream, blood in the urine or vaginal/penile discharge.    Skin:    The patient denies rash, itching, skin lesions, dry skin, change in skin color or change in moles.     Hematologic/Lymphatic:  The patient denies easily bruising or bleeding or persistent swollen glands or lymph nodes.     Musculoskeletal:  The patient denies muscle aches/pain, joint pain, stiff joints, neck pain, back pain or bone pain.    Neuropsychiatric:  There is no history of migraines or severe headaches, seizure/epilepsy, speech problems, coordination problems, trembling/tremors, fainting/black outs, dizziness, memory problems, loss of sensation/numbness, problems walking, weakness, tingling or burning in hands/feet. There is no history of abusive relationship, bipolar disorder, sleep disturbance, anxiety, depression or feeling of despair.    Endocrine:    There is no history of poor/slow wound healing, weight loss/gain, fertility or hormone problems,  cold intolerance, thyroid disease.     Allergic/Immunologic:  There is no history of hives, hay fever, angioedema or anaphylaxis.    /68 (BP Location: Right arm, Patient Position: Sitting, Cuff Size: adult)   Pulse 60   Temp 98.5 °F (36.9 °C) (Temporal)   Resp 17   Ht 1.626 m (5' 4\")   Wt 77.5 kg (170 lb 12.8 oz)   SpO2 98%   BMI 29.32 kg/m²     Physical Exam:  The patient is an alert, oriented, well-nourished and  well-developed woman who appears her stated age. Her speech patterns and movements are normal. Her affect is appropriate.    HEENT: The head is normocephalic. The neck is supple. The thyroid is not enlarged and is without palpable masses/nodules. There are no palpable masses. The trachea is in the midline. Conjunctiva are clear, non-icteric.    Chest: The chest expands symmetrically. The lungs are clear to auscultation.    Heart: The rhythm is regular.  There are no murmurs, rubs, gallops or thrills.    Breasts:  Her breasts are symmetrical with a cup size .  Right breast: The skin, nipple ,and areola appear normal. There is no skin dimpling with movement of the pectoralis. There is no nipple retraction. No nipple discharge can be elicited. The parenchyma is mildly nodular. There are no dominant masses in the breast. The axillary tail is normal.  Port site was small subcentimeter mobile nodule underlying.  Left breast: Surgically absent with no palpable chest wall nodules, skin changes or axillary adenopathy appreciated.    Abdomen:  The abdomen is soft, flat and non tender. The liver is not enlarged. There are no palpable masses.    Lymph Nodes:  The supraclavicular, axillary and cervical regions are free of significant lymphadenopathy.    Back: There is no vertebral column tenderness.    Skin: The skin appears normal. There are no suspicious appearing rashes or lesions.    Extremities: The extremities are without deformity, cyanosis or edema.    Impression:   Ms. Kelsi Sena is a 79  year old woman presents with history of left breast cancer status postmastectomy and sentinel lymph node biopsy.    Discussion and Plan:  I had a discussion with the Patient regarding her breast exam which is unremarkable.  She does have a small nodule underlying the right chest wall port site and I recommended a targeted ultrasound for further evaluation.    Targeted right chest wall and left axillary ultrasound  Targeted ultrasound over the port site palpable mass was performed.  She was found to have a small seroma measuring 4 mm in maximal dimension with no suspicious characteristics.  She also said she feels something of her left axilla and I did a targeted ultrasound of the left axilla that demonstrated a morphologically normal-appearing lymph node measuring up to 9 mm in maximal dimension.      I reassured the patient that there are no acute clinical concerns and her radiological assessment is concordant with the clinical exam.  She will continue to follow with medical oncology for management of any systemic needs and will be due for her next right breast screening in December 2024.  She should be seen annually for clinical exam.  She was given ample opportunity for questions and those questions were answered to her satisfaction. She has been  encouraged to contact the office with any questions or concerns prior to her next appointment.     This note was created by Dragon voice recognition. Errors in content may be related to improper recognition by the system; efforts to review and correct have been done but errors may still exist. Please be advised the primary purpose of this note is for me to communicate medical care. Standard sentence structure is not always used. Medical terminology and medical abbreviations may be used. There may be grammatical, typographical, and automated fill ins that may have errors missed in proofreading.

## 2024-08-31 PROBLEM — Z85.3 HISTORY OF LEFT BREAST CANCER: Status: ACTIVE | Noted: 2024-08-31

## 2024-09-10 ENCOUNTER — APPOINTMENT (OUTPATIENT)
Dept: HEMATOLOGY/ONCOLOGY | Facility: HOSPITAL | Age: 79
End: 2024-09-10
Attending: INTERNAL MEDICINE
Payer: COMMERCIAL

## 2024-09-17 ENCOUNTER — LAB ENCOUNTER (OUTPATIENT)
Dept: LAB | Age: 79
End: 2024-09-17
Attending: INTERNAL MEDICINE
Payer: COMMERCIAL

## 2024-09-17 DIAGNOSIS — C50.812 MALIGNANT NEOPLASM OF OVERLAPPING SITES OF LEFT BREAST IN FEMALE, ESTROGEN RECEPTOR NEGATIVE (HCC): ICD-10-CM

## 2024-09-17 DIAGNOSIS — Z17.1 MALIGNANT NEOPLASM OF OVERLAPPING SITES OF LEFT BREAST IN FEMALE, ESTROGEN RECEPTOR NEGATIVE (HCC): ICD-10-CM

## 2024-09-17 LAB
ALBUMIN SERPL-MCNC: 3.8 G/DL (ref 3.2–4.8)
ALBUMIN/GLOB SERPL: 1.1 {RATIO} (ref 1–2)
ALP LIVER SERPL-CCNC: 93 U/L
ALT SERPL-CCNC: 16 U/L
ANION GAP SERPL CALC-SCNC: 8 MMOL/L (ref 0–18)
AST SERPL-CCNC: 19 U/L (ref ?–34)
BASOPHILS # BLD AUTO: 0.02 X10(3) UL (ref 0–0.2)
BASOPHILS NFR BLD AUTO: 0.2 %
BILIRUB SERPL-MCNC: 0.4 MG/DL (ref 0.2–1.1)
BUN BLD-MCNC: 15 MG/DL (ref 9–23)
CALCIUM BLD-MCNC: 10.3 MG/DL (ref 8.7–10.4)
CHLORIDE SERPL-SCNC: 101 MMOL/L (ref 98–112)
CO2 SERPL-SCNC: 29 MMOL/L (ref 21–32)
CREAT BLD-MCNC: 0.73 MG/DL
DEPRECATED HBV CORE AB SER IA-ACNC: 318.4 NG/ML
EGFRCR SERPLBLD CKD-EPI 2021: 83 ML/MIN/1.73M2 (ref 60–?)
EOSINOPHIL # BLD AUTO: 0.27 X10(3) UL (ref 0–0.7)
EOSINOPHIL NFR BLD AUTO: 3.3 %
ERYTHROCYTE [DISTWIDTH] IN BLOOD BY AUTOMATED COUNT: 13.4 %
FASTING STATUS PATIENT QL REPORTED: NO
GLOBULIN PLAS-MCNC: 3.4 G/DL (ref 2–3.5)
GLUCOSE BLD-MCNC: 112 MG/DL (ref 70–99)
HCT VFR BLD AUTO: 36.8 %
HGB BLD-MCNC: 12 G/DL
IMM GRANULOCYTES # BLD AUTO: 0.06 X10(3) UL (ref 0–1)
IMM GRANULOCYTES NFR BLD: 0.7 %
IRON SATN MFR SERPL: 23 %
IRON SERPL-MCNC: 65 UG/DL
LYMPHOCYTES # BLD AUTO: 2.73 X10(3) UL (ref 1–4)
LYMPHOCYTES NFR BLD AUTO: 32.9 %
MCH RBC QN AUTO: 27.4 PG (ref 26–34)
MCHC RBC AUTO-ENTMCNC: 32.6 G/DL (ref 31–37)
MCV RBC AUTO: 84 FL
MONOCYTES # BLD AUTO: 0.5 X10(3) UL (ref 0.1–1)
MONOCYTES NFR BLD AUTO: 6 %
NEUTROPHILS # BLD AUTO: 4.72 X10 (3) UL (ref 1.5–7.7)
NEUTROPHILS # BLD AUTO: 4.72 X10(3) UL (ref 1.5–7.7)
NEUTROPHILS NFR BLD AUTO: 56.9 %
OSMOLALITY SERPL CALC.SUM OF ELEC: 288 MOSM/KG (ref 275–295)
PLATELET # BLD AUTO: 289 10(3)UL (ref 150–450)
POTASSIUM SERPL-SCNC: 3.8 MMOL/L (ref 3.5–5.1)
PROT SERPL-MCNC: 7.2 G/DL (ref 5.7–8.2)
RBC # BLD AUTO: 4.38 X10(6)UL
SODIUM SERPL-SCNC: 138 MMOL/L (ref 136–145)
TOTAL IRON BINDING CAPACITY: 280 UG/DL (ref 250–425)
TRANSFERRIN SERPL-MCNC: 206 MG/DL (ref 250–380)
WBC # BLD AUTO: 8.3 X10(3) UL (ref 4–11)

## 2024-09-17 PROCEDURE — 83550 IRON BINDING TEST: CPT

## 2024-09-17 PROCEDURE — 83540 ASSAY OF IRON: CPT

## 2024-09-17 PROCEDURE — 82728 ASSAY OF FERRITIN: CPT

## 2024-09-17 PROCEDURE — 85025 COMPLETE CBC W/AUTO DIFF WBC: CPT

## 2024-09-17 PROCEDURE — 80053 COMPREHEN METABOLIC PANEL: CPT

## 2024-09-24 ENCOUNTER — APPOINTMENT (OUTPATIENT)
Dept: HEMATOLOGY/ONCOLOGY | Facility: HOSPITAL | Age: 79
End: 2024-09-24
Attending: INTERNAL MEDICINE
Payer: COMMERCIAL

## 2024-09-24 NOTE — PROGRESS NOTES
[facility] Progress Note      Patient Name:  Kelsi Sena  YOB: 1945  Medical Record Number:  [id number]    Date of visit: 9/25/2024    CHIEF COMPLAINT: HER2 + L breast carcinoma.    ONCOLOGY HISTORY:    HER2 positive left breast carcinoma diagnosed 2/23.  Weekly paclitaxel/trastuzumab 3/23-5/23.  Trastuzumab 6/23-2/24.  L mastectomy/SLN biopsy 7/23-complete pathologic response.    HPI:     79 year old that I have followed since 2/23. L biopsy 2/23-grade 2 IDC, ER/SD neg, HER2+, Ki-67 25-30%.  Clinical T1N0.  1.2 cm mass on mammogram, 0.5 cm length of tumor on core biopsy.  Received 12 doses of weekly paclitaxel/trastuzumab from 3/16/23-5/30/23.  Has been on trastuzumab since then.  S/p L /SLN bx7/27/23.  Path-complete response.  Trastuzumab completed 2/24.    No new complaints.  Feels well.    SOCIAL HISTORY:    . 2 sons-local. 5 grand kids-oldest 19.    ROS:     Constitutional: no fever, chills fatigue,night sweats or weight loss  Neurologic: no headache, seizures, diplopia or weakness  Respiratory: no cough, SOB or hemoptysis  Cardiovascular: no chest pain, ankle swelling, LUU  GI: no nausea, vomiting, diarrhea or BRBPR  Musculoskeletal: no body-ache or joint pain  Dermatologic: no alopecia, rash, pruritis  : no hematuria, dysuria or frequency  Psych: no confusion or depression   Heme: no easy bruising or bleeding     ALLERGIES:    Allergies   Allergen Reactions    Fosamax [Alendronate Sodium] DIZZINESS       MEDICATIONS:    Current Outpatient Medications   Medication Sig Dispense Refill    carvedilol 12.5 MG Oral Tab Take 1 tablet (12.5 mg total) by mouth 2 (two) times daily with meals.      Glucosamine 500 MG Oral Cap Take 1 capsule by mouth daily.      aspirin 81 MG Oral Tab EC Take 1 tablet (81 mg total) by mouth daily.  0    atorvastatin 20 MG Oral Tab Take 1 tablet (20 mg total) by mouth nightly.      hydrochlorothiazide 25 MG Oral Tab Take 1 tablet (25 mg total) by  mouth daily. Hold if systolic BP is below 120  0    Losartan Potassium 25 MG Oral Tab Take 1 tablet (25 mg total) by mouth nightly. Hold if systolic BP is below 120  1.5 tablets at HS 30 tablet 11    Acetaminophen 500 MG Oral Cap Take 1-2 capsules (500-1,000 mg total) by mouth every 6 (six) hours as needed for Pain. 60 capsule 2    Levothyroxine Sodium 75 MCG Oral Tab Take 1 tablet (75 mcg total) by mouth before breakfast.      ergocalciferol 80167 UNITS Oral Cap Take 1 capsule (50,000 Units total) by mouth once a week.  0    Multiple Vitamins-Minerals (MULTIVITAMIN WITH MINERALS) Oral Tab Take 1 tablet by mouth daily.         VITALS:  Height: 163.8 cm (5' 4.49\") (1117)  Weight: 76.9 kg (169 lb 8 oz) (1117)  BSA (Calculated - sq m): 1.83 sq meters (1117)  Pulse: 62 (1117)  BP: 141/78 (1117)  Temp: 97.3 °F (36.3 °C) (1117)  Do Not Use - Resp Rate: --  SpO2: 98 % (1117)    PS:  ECO    PHYSICAL EXAM:    General: alert and oriented x 3, not in acute distress.  Accompanied by .  HEENT: MOE.  CVS: S1S2, regular  Rhythm, no murmurs.   Lungs: Clear to auscultation and percussion.  Abdomen:   CNS: no focal deficit  Breasts: Deferred, just saw surgical oncology.    Emotional well being: Patient's emotional well being was assessed.  No issues requiring acute psychosocial intervention were identified.     LABS:     No results found for this or any previous visit (from the past 24 hour(s)).      ASSESSMENT AND PLAN:     # Her 2 + L breast ca: clinical T1 N0. Ximena .  Bx-grade 2 IDC, ER/NV neg, HER+, Ki-67 25-30%.  Received neoadjuvant weekly paclitaxel/trastuzumab from 3/23-.  S/p L mastectomy/SLN bx 23.  No residual carcinoma in breast, 2 tumor beds measuring 11 mm and 9 mm.  LN negative.  Trastuzumab completed .  Not candidate for RT or endocrine therapy.  Port removed .  R mammogram  okay, next due .    # Iron defi anemia:  Resolved.      ORDERS PLACED:    Return for Labs, MD 4-6 months.    Trang Soares M.D.    [facility]    [facility]  [address] [place], [address]    9/25/2024

## 2024-09-25 ENCOUNTER — OFFICE VISIT (OUTPATIENT)
Dept: HEMATOLOGY/ONCOLOGY | Facility: HOSPITAL | Age: 79
End: 2024-09-25
Attending: INTERNAL MEDICINE
Payer: COMMERCIAL

## 2024-09-25 VITALS
WEIGHT: 169.5 LBS | SYSTOLIC BLOOD PRESSURE: 141 MMHG | HEIGHT: 64.49 IN | TEMPERATURE: 97 F | BODY MASS INDEX: 28.58 KG/M2 | OXYGEN SATURATION: 98 % | DIASTOLIC BLOOD PRESSURE: 78 MMHG | RESPIRATION RATE: 16 BRPM | HEART RATE: 62 BPM

## 2024-09-25 DIAGNOSIS — C50.812 MALIGNANT NEOPLASM OF OVERLAPPING SITES OF LEFT BREAST IN FEMALE, ESTROGEN RECEPTOR NEGATIVE (HCC): Primary | ICD-10-CM

## 2024-09-25 DIAGNOSIS — T45.1X5D ADVERSE EFFECT OF CHEMOTHERAPY, SUBSEQUENT ENCOUNTER: ICD-10-CM

## 2024-09-25 DIAGNOSIS — Z17.1 MALIGNANT NEOPLASM OF OVERLAPPING SITES OF LEFT BREAST IN FEMALE, ESTROGEN RECEPTOR NEGATIVE (HCC): Primary | ICD-10-CM

## 2024-09-25 PROCEDURE — 99214 OFFICE O/P EST MOD 30 MIN: CPT | Performed by: INTERNAL MEDICINE

## 2024-09-25 RX ORDER — CARVEDILOL 12.5 MG/1
12.5 TABLET ORAL 2 TIMES DAILY WITH MEALS
COMMUNITY
Start: 2024-08-29

## 2024-09-25 NOTE — PROGRESS NOTES
Education Record    Learner:  Patient/ spouse    Disease / Diagnosis: her 2+ left breast cancer   Completed tx 2/2024  Left mastectomy   Barriers / Limitations:  None   Comments:    Method:  Discussion   Comments:    General Topics:  Plan of care reviewed   Comments:    Outcome:  Shows understanding   Comments:   Right breast mammogram is scheduled.   Pt saw Dr Hernandez 8/31, exam completed and US to axilla, and old port site, no concerns.   Eating and drinking well.   Energy level is good.

## 2024-12-23 ENCOUNTER — HOSPITAL ENCOUNTER (OUTPATIENT)
Dept: MAMMOGRAPHY | Age: 79
Discharge: HOME | End: 2024-12-23
Attending: SURGERY
Payer: COMMERCIAL

## 2024-12-23 DIAGNOSIS — Z85.3 HISTORY OF LEFT BREAST CANCER: ICD-10-CM

## 2024-12-23 DIAGNOSIS — Z12.31 ENCOUNTER FOR SCREENING MAMMOGRAM FOR MALIGNANT NEOPLASM OF BREAST: ICD-10-CM

## 2024-12-23 PROCEDURE — 77067 SCR MAMMO BI INCL CAD: CPT | Performed by: SURGERY

## 2024-12-23 PROCEDURE — 77063 BREAST TOMOSYNTHESIS BI: CPT | Performed by: SURGERY

## 2025-03-04 ENCOUNTER — APPOINTMENT (OUTPATIENT)
Dept: HEMATOLOGY/ONCOLOGY | Facility: HOSPITAL | Age: 80
End: 2025-03-04
Attending: INTERNAL MEDICINE
Payer: COMMERCIAL

## 2025-04-01 ENCOUNTER — LAB ENCOUNTER (OUTPATIENT)
Dept: LAB | Age: 80
End: 2025-04-01
Attending: INTERNAL MEDICINE
Payer: COMMERCIAL

## 2025-04-01 DIAGNOSIS — D64.9 ANEMIA, UNSPECIFIED TYPE: ICD-10-CM

## 2025-04-01 DIAGNOSIS — C50.812 MALIGNANT NEOPLASM OF OVERLAPPING SITES OF LEFT BREAST IN FEMALE, ESTROGEN RECEPTOR NEGATIVE (HCC): ICD-10-CM

## 2025-04-01 DIAGNOSIS — Z17.1 MALIGNANT NEOPLASM OF OVERLAPPING SITES OF LEFT BREAST IN FEMALE, ESTROGEN RECEPTOR NEGATIVE (HCC): ICD-10-CM

## 2025-04-01 LAB
ALBUMIN SERPL-MCNC: 4.4 G/DL (ref 3.2–4.8)
ALBUMIN/GLOB SERPL: 1.5 {RATIO} (ref 1–2)
ALP LIVER SERPL-CCNC: 92 U/L
ALT SERPL-CCNC: 19 U/L
ANION GAP SERPL CALC-SCNC: 7 MMOL/L (ref 0–18)
AST SERPL-CCNC: 27 U/L (ref ?–34)
BASOPHILS # BLD AUTO: 0.02 X10(3) UL (ref 0–0.2)
BASOPHILS NFR BLD AUTO: 0.3 %
BILIRUB SERPL-MCNC: 0.4 MG/DL (ref 0.2–1.1)
BUN BLD-MCNC: 22 MG/DL (ref 9–23)
CALCIUM BLD-MCNC: 10.1 MG/DL (ref 8.7–10.6)
CHLORIDE SERPL-SCNC: 104 MMOL/L (ref 98–112)
CO2 SERPL-SCNC: 31 MMOL/L (ref 21–32)
CREAT BLD-MCNC: 0.96 MG/DL
DEPRECATED HBV CORE AB SER IA-ACNC: 196 NG/ML
EGFRCR SERPLBLD CKD-EPI 2021: 60 ML/MIN/1.73M2 (ref 60–?)
EOSINOPHIL # BLD AUTO: 0.23 X10(3) UL (ref 0–0.7)
EOSINOPHIL NFR BLD AUTO: 3.1 %
ERYTHROCYTE [DISTWIDTH] IN BLOOD BY AUTOMATED COUNT: 14.2 %
FASTING STATUS PATIENT QL REPORTED: NO
GLOBULIN PLAS-MCNC: 2.9 G/DL (ref 2–3.5)
GLUCOSE BLD-MCNC: 104 MG/DL (ref 70–99)
HCT VFR BLD AUTO: 36.4 %
HGB BLD-MCNC: 11.8 G/DL
HGB RETIC QN AUTO: 33.1 PG (ref 28.2–36.6)
IMM GRANULOCYTES # BLD AUTO: 0.03 X10(3) UL (ref 0–1)
IMM GRANULOCYTES NFR BLD: 0.4 %
IMM RETICS NFR: 0.1 RATIO (ref 0.1–0.3)
LYMPHOCYTES # BLD AUTO: 2.26 X10(3) UL (ref 1–4)
LYMPHOCYTES NFR BLD AUTO: 30.8 %
MCH RBC QN AUTO: 28 PG (ref 26–34)
MCHC RBC AUTO-ENTMCNC: 32.4 G/DL (ref 31–37)
MCV RBC AUTO: 86.5 FL
MONOCYTES # BLD AUTO: 0.61 X10(3) UL (ref 0.1–1)
MONOCYTES NFR BLD AUTO: 8.3 %
NEUTROPHILS # BLD AUTO: 4.18 X10 (3) UL (ref 1.5–7.7)
NEUTROPHILS # BLD AUTO: 4.18 X10(3) UL (ref 1.5–7.7)
NEUTROPHILS NFR BLD AUTO: 57.1 %
OSMOLALITY SERPL CALC.SUM OF ELEC: 298 MOSM/KG (ref 275–295)
PLATELET # BLD AUTO: 219 10(3)UL (ref 150–450)
POTASSIUM SERPL-SCNC: 3.8 MMOL/L (ref 3.5–5.1)
PROT SERPL-MCNC: 7.3 G/DL (ref 5.7–8.2)
RBC # BLD AUTO: 4.21 X10(6)UL
RETICS # AUTO: 56.9 X10(3) UL (ref 22.5–147.5)
RETICS/RBC NFR AUTO: 1.3 %
SODIUM SERPL-SCNC: 142 MMOL/L (ref 136–145)
WBC # BLD AUTO: 7.3 X10(3) UL (ref 4–11)

## 2025-04-01 PROCEDURE — 80053 COMPREHEN METABOLIC PANEL: CPT

## 2025-04-01 PROCEDURE — 36415 COLL VENOUS BLD VENIPUNCTURE: CPT

## 2025-04-01 PROCEDURE — 85025 COMPLETE CBC W/AUTO DIFF WBC: CPT

## 2025-04-01 PROCEDURE — 85045 AUTOMATED RETICULOCYTE COUNT: CPT

## 2025-04-01 PROCEDURE — 82728 ASSAY OF FERRITIN: CPT

## 2025-04-07 NOTE — PROGRESS NOTES
[facility] Progress Note      Patient Name:  Kelsi Sena  YOB: 1945  Medical Record Number:  [id number]    Date of visit: 4/9/2025    CHIEF COMPLAINT: HER2 + L breast carcinoma.    ONCOLOGY HISTORY:    HER2 positive left breast carcinoma diagnosed 2/23.  Weekly paclitaxel/trastuzumab 3/23-5/23.  Trastuzumab 6/23-2/24.  L mastectomy/SLN biopsy 7/23-complete pathologic response.    HPI:     79 year old that I have followed since 2/23. L biopsy 2/23-grade 2 IDC, ER/OK neg, HER2+, Ki-67 25-30%.  Clinical T1N0.  1.2 cm mass on mammogram, 0.5 cm length of tumor on core biopsy.  Received 12 doses of weekly paclitaxel/trastuzumab from 3/16/23-5/30/23.  Has been on trastuzumab since then.  S/p L /SLN bx7/27/23.  Path-complete response.  Trastuzumab completed 2/24.    No new complaints.  Feels well.  No new areas of bone pain, headache.    SOCIAL HISTORY:    . 2 sons-local. 5 grand kids-oldest 19.    ROS:     Constitutional: no fever, chills fatigue,night sweats or weight loss  Neurologic: no headache, seizures, diplopia or weakness  Respiratory: no cough, SOB or hemoptysis  Cardiovascular: no chest pain, ankle swelling, LUU  GI: no nausea, vomiting, diarrhea or BRBPR  Musculoskeletal: no body-ache or joint pain  Dermatologic: no alopecia, rash, pruritis  : no hematuria, dysuria or frequency  Psych: no confusion or depression   Heme: no easy bruising or bleeding     ALLERGIES:    Allergies   Allergen Reactions    Fosamax [Alendronate Sodium] DIZZINESS       MEDICATIONS:    Current Outpatient Medications   Medication Sig Dispense Refill    carvedilol 12.5 MG Oral Tab Take 1 tablet (12.5 mg total) by mouth in the morning and 1 tablet (12.5 mg total) in the evening. Take with meals.      Glucosamine 500 MG Oral Cap Take 1 capsule by mouth in the morning.      aspirin 81 MG Oral Tab EC Take 1 tablet (81 mg total) by mouth in the morning.  0    atorvastatin 20 MG Oral Tab Take 1 tablet (20 mg  total) by mouth nightly.      hydrochlorothiazide 25 MG Oral Tab Take 1 tablet (25 mg total) by mouth in the morning. Hold if systolic BP is below 120.  0    Losartan Potassium 25 MG Oral Tab Take 1 tablet (25 mg total) by mouth nightly. Hold if systolic BP is below 120  1.5 tablets at HS 30 tablet 11    Acetaminophen 500 MG Oral Cap Take 1-2 capsules (500-1,000 mg total) by mouth every 6 (six) hours as needed for Pain. 60 capsule 2    Levothyroxine Sodium 75 MCG Oral Tab Take 1 tablet (75 mcg total) by mouth before breakfast.      ergocalciferol 40085 UNITS Oral Cap Take 1 capsule (50,000 Units total) by mouth once a week.  0    Multiple Vitamins-Minerals (MULTIVITAMIN WITH MINERALS) Oral Tab Take 1 tablet by mouth in the morning.         VITALS:  Height: 163.8 cm (5' 4.49\") (1126)  Weight: 76.6 kg (168 lb 12.8 oz) (1126)  BSA (Calculated - sq m): 1.83 sq meters (1126)  Pulse: 64 (1126)  BP: 117/73 (1126)  Temp: 97.3 °F (36.3 °C) (1126)  Do Not Use - Resp Rate: --  SpO2: 96 % (1126)    PS:  ECO    PHYSICAL EXAM:    General: alert and oriented x 3, not in acute distress.  Accompanied by .  HEENT: MOE.  CVS: S1S2, regular  Rhythm, no murmurs.   Lungs: Clear to auscultation and percussion.  Abdomen:   CNS: no focal deficit  Breasts: Well-healed left-sided mastectomy scar.  Right breast is soft.  No masses or axillary adenopathy either side.    Emotional well being: Patient's emotional well being was assessed.  No issues requiring acute psychosocial intervention were identified.     LABS:     No results found for this or any previous visit (from the past 24 hours).      ASSESSMENT AND PLAN:     # Her 2 + L breast ca: clinical T1 N0. Ximena .  Bx-grade 2 IDC, ER/MO neg, HER+, Ki-67 25-30%.  Received neoadjuvant weekly paclitaxel/trastuzumab from 3/23-.  S/p L mastectomy/SLN bx .  No residual ca.      Trastuzumab completed .  Not candidate for RT or  endocrine therapy.  Port removed 2/24.  R mammogram 12/24 okay, next due 12/25.    # Iron defi anemia: Resolved.    ORDERS PLACED:          Procedures    CBC With Differential With Platelet    Comp Metabolic Panel (14)    Ferritin    Iron And Tibc    [facility] KIMBERLY 2D+3D SCREENING RIGHT (CPT=77067-52/31559)         Return in about 1 year (around 4/9/2026) for Labs, MD visit.    Trang Soares M.D.    [facility]  [address] [place] [address]   [place], [address]      4/9/2025

## 2025-04-09 ENCOUNTER — OFFICE VISIT (OUTPATIENT)
Age: 80
End: 2025-04-09
Attending: INTERNAL MEDICINE
Payer: COMMERCIAL

## 2025-04-09 VITALS
OXYGEN SATURATION: 96 % | HEART RATE: 64 BPM | SYSTOLIC BLOOD PRESSURE: 117 MMHG | WEIGHT: 168.81 LBS | RESPIRATION RATE: 16 BRPM | TEMPERATURE: 97 F | DIASTOLIC BLOOD PRESSURE: 73 MMHG | BODY MASS INDEX: 28.47 KG/M2 | HEIGHT: 64.49 IN

## 2025-04-09 DIAGNOSIS — D64.9 ANEMIA, UNSPECIFIED TYPE: ICD-10-CM

## 2025-04-09 DIAGNOSIS — Z17.1 MALIGNANT NEOPLASM OF OVERLAPPING SITES OF LEFT BREAST IN FEMALE, ESTROGEN RECEPTOR NEGATIVE (HCC): Primary | ICD-10-CM

## 2025-04-09 DIAGNOSIS — Z85.3 HISTORY OF LEFT BREAST CANCER: ICD-10-CM

## 2025-04-09 DIAGNOSIS — C50.812 MALIGNANT NEOPLASM OF OVERLAPPING SITES OF LEFT BREAST IN FEMALE, ESTROGEN RECEPTOR NEGATIVE (HCC): Primary | ICD-10-CM

## 2025-04-09 DIAGNOSIS — Z12.31 ENCOUNTER FOR SCREENING MAMMOGRAM FOR MALIGNANT NEOPLASM OF BREAST: ICD-10-CM

## 2025-04-09 NOTE — PROGRESS NOTES
Education Record    Learner:  Patient/ spouse    Disease / Diagnosis: left breast her 2+ breast cancer   Left mastectomy     Barriers / Limitations:  None   Comments:    Method:  Discussion   Comments:    General Topics:  Plan of care reviewed   Comments:    Outcome:  Shows understanding   Comments:   Right mammogram is up to date.   Pt feeling well, energy is good, denies pain.

## 2025-08-26 ENCOUNTER — OFFICE VISIT (OUTPATIENT)
Dept: SURGERY | Facility: CLINIC | Age: 80
End: 2025-08-26

## 2025-08-26 VITALS
DIASTOLIC BLOOD PRESSURE: 72 MMHG | BODY MASS INDEX: 29 KG/M2 | WEIGHT: 169 LBS | OXYGEN SATURATION: 96 % | RESPIRATION RATE: 16 BRPM | HEART RATE: 61 BPM | SYSTOLIC BLOOD PRESSURE: 146 MMHG

## 2025-08-26 DIAGNOSIS — Z85.3 HISTORY OF LEFT BREAST CANCER: Primary | ICD-10-CM

## 2025-08-26 DIAGNOSIS — Z12.31 SCREENING MAMMOGRAM, ENCOUNTER FOR: ICD-10-CM

## (undated) DEVICE — SYRINGE MNJCT 35ML LF STRL LL

## (undated) DEVICE — HOOD: Brand: FLYTE

## (undated) DEVICE — 33MM MG 2.5 HEX HEAD PIN-DJ

## (undated) DEVICE — ANTIBACTERIAL VIOLET BRAIDED (POLYGLACTIN 910), SYNTHETIC ABSORBABLE SUTURE: Brand: COATED VICRYL

## (undated) DEVICE — COTTON UNDERCAST PADDING,REGULAR FINISH: Brand: WEBRIL

## (undated) DEVICE — BOWL CEMENT MIX QUICK-VAC

## (undated) DEVICE — TOWEL SURG OR 17X30IN BLUE

## (undated) DEVICE — GOWN,SIRUS,FABRIC-REINFORCED,X-LARGE: Brand: MEDLINE

## (undated) DEVICE — SUT SILK 0 FSL 678G

## (undated) DEVICE — Device: Brand: STABLECUT®

## (undated) DEVICE — GAMMEX® PI HYBRID SIZE 7.5, STERILE POWDER-FREE SURGICAL GLOVE, POLYISOPRENE AND NEOPRENE BLEND: Brand: GAMMEX

## (undated) DEVICE — GAUZE,SPONGE,FLUFF,6"X6.75",STRL,5/TRAY: Brand: MEDLINE

## (undated) DEVICE — SUPER SPONGES,MEDIUM: Brand: KERLIX

## (undated) DEVICE — SPINOCAN® 18 GA. X 3-1/2 IN. (90 MM) SPINAL NEEDLE: Brand: SPINOCAN®

## (undated) DEVICE — CHLORAPREP 26ML APPLICATOR

## (undated) DEVICE — SOL  .9 3000ML

## (undated) DEVICE — SUT VICRYL 3-0 SH J416H

## (undated) DEVICE — DRAIN CHANNEL 19FR BLAKE

## (undated) DEVICE — SUTURE MONOCRYL 3-0 Y936H

## (undated) DEVICE — SLEEVE KENDALL SCD EXPRESS MED

## (undated) DEVICE — LIGHT HANDLE

## (undated) DEVICE — SOL  .9 1000ML BTL

## (undated) DEVICE — TOTAL KNEE: Brand: MEDLINE INDUSTRIES, INC.

## (undated) DEVICE — 50MM MG 2.5 HEX HEAD PIN-DJ

## (undated) DEVICE — UNDYED BRAIDED (POLYGLACTIN 910), SYNTHETIC ABSORBABLE SUTURE: Brand: COATED VICRYL

## (undated) DEVICE — STERILE POLYISOPRENE POWDER-FREE SURGICAL GLOVES: Brand: PROTEXIS

## (undated) DEVICE — EVACUATOR RELIAVAC 100CC

## (undated) DEVICE — WRAP COOLING KNEE W/ICE PILLOW

## (undated) DEVICE — DRAPE SRG 70X60IN SPLT U IMPRV

## (undated) DEVICE — SUT ETHILON 2-0 FS 664H

## (undated) DEVICE — Device

## (undated) DEVICE — DERMABOND LIQUID ADHESIVE

## (undated) DEVICE — GAUZE SPONGES,12 PLY: Brand: CURITY

## (undated) DEVICE — BREAST-HERNIA-PORT CDS-LF: Brand: MEDLINE INDUSTRIES, INC.

## (undated) DEVICE — BRA SURGICAL ELIZABETH PINK L

## (undated) DEVICE — 3M™ IOBAN™ 2 ANTIMICROBIAL INCISE DRAPE 6650EZ: Brand: IOBAN™ 2

## (undated) DEVICE — GAMMEX® PI HYBRID SIZE 8, STERILE POWDER-FREE SURGICAL GLOVE, POLYISOPRENE AND NEOPRENE BLEND: Brand: GAMMEX

## (undated) DEVICE — SPECIMEN CONTAINER,POSITIVE SEAL INDICATOR, OR PACKAGED: Brand: PRECISION

## (undated) DEVICE — PROVE COVER: Brand: UNBRANDED

## (undated) DEVICE — 60 ML SYRINGE,TOOMEY TYPE: Brand: MONOJECT

## (undated) DEVICE — 60 ML SYRINGE LUER-LOCK TIP: Brand: MONOJECT

## (undated) DEVICE — GAMMEX® PI HYBRID SIZE 6.5, STERILE POWDER-FREE SURGICAL GLOVE, POLYISOPRENE AND NEOPRENE BLEND: Brand: GAMMEX

## (undated) DEVICE — ANTIBACTERIAL UNDYED BRAIDED (POLYGLACTIN 910), SYNTHETIC ABSORBABLE SUTURE: Brand: COATED VICRYL

## (undated) DEVICE — DRAPE SHEET LG

## (undated) DEVICE — PENCIL TELESCOPE MEGADYNE SE

## (undated) DEVICE — SYRINGE 10ML LL TIP

## (undated) DEVICE — SOL NACL IRRIG 0.9% 1000ML BTL

## (undated) DEVICE — CURAD PAPER TAPE 2IN

## (undated) DEVICE — TKA CAP, PRIMARY W/O STEM EXT: Type: IMPLANTABLE DEVICE

## (undated) NOTE — Clinical Note
I had the pleasure of seeing Jose Eduardo Rodriguez on 8/15/2023. Please see my attached note.   Lita Perez MD FACS [facility]--Surgery

## (undated) NOTE — LETTER
OUTSIDE TESTING RESULT REQUEST     IMPORTANT: FOR YOUR IMMEDIATE ATTENTION  Please FAX all test results listed below to: [phone]     Testing already done on or about:      * * * * If testing is NOT complete, arrange with patient A.S.A.P. * * * *      Patient Name: Jemal Deras  Surgery Date: 2023  Medical Record: [id number]  CSN: [id number]  : 1945 - A: 77 y     Sex: female  Surgeon(s):  Khris Santillan MD  Procedure: LEFT BREAST MASTECTOMY, SENTINEL LYMPH NODE BIOPSY, POSSIBLE TOTAL AXILLARY LYMPH NODE DISSECTION, INJECTION OF BLUE DYE  Anesthesia Type: General     Surgeon: Khris Santillan MD     The following Testing and Time Line are REQUIRED PER ANESTHESIA     EKG READ AND SIGNED WITHIN   90 days      Thank Tamika Flores,   Sent by: Demetria Michelle RN

## (undated) NOTE — Clinical Note
I had the pleasure of seeing Steven Pa on 6/16/2023. Please see my attached note.   Beata Garcia MD FACS [facility]--Surgery

## (undated) NOTE — Clinical Note
Doing fine, but had an episode of mild vaginal bleeding (a few drops on underwear one day). I said I would let you know.

## (undated) NOTE — Clinical Note
I had the pleasure of seeing Lilly Massimo on 12/26/2023. Please see my attached note.   Viry Garcia MD FACS [facility]--Surgery